# Patient Record
Sex: MALE | Race: WHITE | NOT HISPANIC OR LATINO | URBAN - METROPOLITAN AREA
[De-identification: names, ages, dates, MRNs, and addresses within clinical notes are randomized per-mention and may not be internally consistent; named-entity substitution may affect disease eponyms.]

---

## 2017-02-13 ENCOUNTER — INPATIENT (INPATIENT)
Facility: HOSPITAL | Age: 62
LOS: 4 days | Discharge: HOME | DRG: 871 | End: 2017-02-18
Attending: INTERNAL MEDICINE | Admitting: INTERNAL MEDICINE
Payer: MEDICARE

## 2017-02-13 VITALS
HEART RATE: 104 BPM | WEIGHT: 171.96 LBS | RESPIRATION RATE: 14 BRPM | DIASTOLIC BLOOD PRESSURE: 90 MMHG | OXYGEN SATURATION: 97 % | SYSTOLIC BLOOD PRESSURE: 165 MMHG | TEMPERATURE: 99 F

## 2017-02-13 DIAGNOSIS — R10.33 PERIUMBILICAL PAIN: ICD-10-CM

## 2017-02-13 LAB
ALBUMIN SERPL ELPH-MCNC: 3 G/DL — LOW (ref 3.3–5)
ALP SERPL-CCNC: 288 U/L — HIGH (ref 40–120)
ALT FLD-CCNC: 52 U/L — SIGNIFICANT CHANGE UP (ref 12–78)
AMYLASE P1 CFR SERPL: 80 U/L — SIGNIFICANT CHANGE UP (ref 25–115)
ANION GAP SERPL CALC-SCNC: 9 MMOL/L — SIGNIFICANT CHANGE UP (ref 5–17)
APPEARANCE UR: CLEAR — SIGNIFICANT CHANGE UP
APTT BLD: 36.8 SEC — SIGNIFICANT CHANGE UP (ref 27.5–37.4)
AST SERPL-CCNC: 80 U/L — HIGH (ref 15–37)
BACTERIA # UR AUTO: ABNORMAL
BASOPHILS # BLD AUTO: 0.1 K/UL — SIGNIFICANT CHANGE UP (ref 0–0.2)
BASOPHILS NFR BLD AUTO: 0.9 % — SIGNIFICANT CHANGE UP (ref 0–2)
BILIRUB SERPL-MCNC: 0.6 MG/DL — SIGNIFICANT CHANGE UP (ref 0.2–1.2)
BILIRUB UR-MCNC: ABNORMAL
BUN SERPL-MCNC: 17 MG/DL — SIGNIFICANT CHANGE UP (ref 7–23)
CALCIUM SERPL-MCNC: 8.6 MG/DL — SIGNIFICANT CHANGE UP (ref 8.5–10.1)
CHLORIDE SERPL-SCNC: 99 MMOL/L — SIGNIFICANT CHANGE UP (ref 96–108)
CO2 SERPL-SCNC: 26 MMOL/L — SIGNIFICANT CHANGE UP (ref 22–31)
COLOR SPEC: YELLOW — SIGNIFICANT CHANGE UP
CREAT SERPL-MCNC: 0.91 MG/DL — SIGNIFICANT CHANGE UP (ref 0.5–1.3)
DIFF PNL FLD: ABNORMAL
EOSINOPHIL # BLD AUTO: 0.1 K/UL — SIGNIFICANT CHANGE UP (ref 0–0.5)
EOSINOPHIL NFR BLD AUTO: 0.8 % — SIGNIFICANT CHANGE UP (ref 0–6)
EPI CELLS # UR: SIGNIFICANT CHANGE UP
ETHANOL SERPL-MCNC: <10 MG/DL — SIGNIFICANT CHANGE UP (ref 0–10)
GLUCOSE SERPL-MCNC: 189 MG/DL — HIGH (ref 70–99)
GLUCOSE UR QL: 300 MG/DL
HCT VFR BLD CALC: 41.3 % — SIGNIFICANT CHANGE UP (ref 39–50)
HGB BLD-MCNC: 13.8 G/DL — SIGNIFICANT CHANGE UP (ref 13–17)
INR BLD: 1.19 RATIO — HIGH (ref 0.88–1.16)
KETONES UR-MCNC: ABNORMAL
LEUKOCYTE ESTERASE UR-ACNC: ABNORMAL
LIDOCAIN IGE QN: 61 U/L — LOW (ref 73–393)
LYMPHOCYTES # BLD AUTO: 1.5 K/UL — SIGNIFICANT CHANGE UP (ref 1–3.3)
LYMPHOCYTES # BLD AUTO: 18.6 % — SIGNIFICANT CHANGE UP (ref 13–44)
MCHC RBC-ENTMCNC: 31.8 PG — SIGNIFICANT CHANGE UP (ref 27–34)
MCHC RBC-ENTMCNC: 33.4 GM/DL — SIGNIFICANT CHANGE UP (ref 32–36)
MCV RBC AUTO: 95.1 FL — SIGNIFICANT CHANGE UP (ref 80–100)
MONOCYTES # BLD AUTO: 0.9 K/UL — SIGNIFICANT CHANGE UP (ref 0–0.9)
MONOCYTES NFR BLD AUTO: 10.5 % — HIGH (ref 1–9)
NEUTROPHILS # BLD AUTO: 5.7 K/UL — SIGNIFICANT CHANGE UP (ref 1.8–7.4)
NEUTROPHILS NFR BLD AUTO: 69.1 % — SIGNIFICANT CHANGE UP (ref 43–77)
NITRITE UR-MCNC: NEGATIVE — SIGNIFICANT CHANGE UP
PH UR: 5 — SIGNIFICANT CHANGE UP (ref 4.8–8)
PLATELET # BLD AUTO: 168 K/UL — SIGNIFICANT CHANGE UP (ref 150–400)
POTASSIUM SERPL-MCNC: 4 MMOL/L — SIGNIFICANT CHANGE UP (ref 3.5–5.3)
POTASSIUM SERPL-SCNC: 4 MMOL/L — SIGNIFICANT CHANGE UP (ref 3.5–5.3)
PROT SERPL-MCNC: 9.6 G/DL — HIGH (ref 6–8.3)
PROT UR-MCNC: 150 MG/DL
PROTHROM AB SERPL-ACNC: 13.2 SEC — HIGH (ref 10–13.1)
RBC # BLD: 4.34 M/UL — SIGNIFICANT CHANGE UP (ref 4.2–5.8)
RBC # FLD: 13 % — SIGNIFICANT CHANGE UP (ref 10.3–14.5)
RBC CASTS # UR COMP ASSIST: SIGNIFICANT CHANGE UP /HPF (ref 0–4)
SODIUM SERPL-SCNC: 134 MMOL/L — LOW (ref 135–145)
SP GR SPEC: 1.02 — SIGNIFICANT CHANGE UP (ref 1.01–1.02)
UROBILINOGEN FLD QL: 1
WBC # BLD: 8.2 K/UL — SIGNIFICANT CHANGE UP (ref 3.8–10.5)
WBC # FLD AUTO: 8.2 K/UL — SIGNIFICANT CHANGE UP (ref 3.8–10.5)
WBC UR QL: SIGNIFICANT CHANGE UP

## 2017-02-13 PROCEDURE — 71020: CPT | Mod: 26

## 2017-02-13 PROCEDURE — 74177 CT ABD & PELVIS W/CONTRAST: CPT | Mod: 26

## 2017-02-13 PROCEDURE — 93010 ELECTROCARDIOGRAM REPORT: CPT

## 2017-02-13 PROCEDURE — 99285 EMERGENCY DEPT VISIT HI MDM: CPT

## 2017-02-13 RX ORDER — PIPERACILLIN AND TAZOBACTAM 4; .5 G/20ML; G/20ML
3.38 INJECTION, POWDER, LYOPHILIZED, FOR SOLUTION INTRAVENOUS ONCE
Qty: 0 | Refills: 0 | Status: COMPLETED | OUTPATIENT
Start: 2017-02-13 | End: 2017-02-13

## 2017-02-13 RX ORDER — SODIUM CHLORIDE 9 MG/ML
3 INJECTION INTRAMUSCULAR; INTRAVENOUS; SUBCUTANEOUS ONCE
Qty: 0 | Refills: 0 | Status: COMPLETED | OUTPATIENT
Start: 2017-02-13 | End: 2017-02-13

## 2017-02-13 RX ORDER — MORPHINE SULFATE 50 MG/1
4 CAPSULE, EXTENDED RELEASE ORAL ONCE
Qty: 0 | Refills: 0 | Status: DISCONTINUED | OUTPATIENT
Start: 2017-02-13 | End: 2017-02-13

## 2017-02-13 RX ORDER — IOHEXOL 300 MG/ML
30 INJECTION, SOLUTION INTRAVENOUS ONCE
Qty: 0 | Refills: 0 | Status: COMPLETED | OUTPATIENT
Start: 2017-02-13 | End: 2017-02-13

## 2017-02-13 RX ORDER — SODIUM CHLORIDE 9 MG/ML
1000 INJECTION INTRAMUSCULAR; INTRAVENOUS; SUBCUTANEOUS
Qty: 0 | Refills: 0 | Status: COMPLETED | OUTPATIENT
Start: 2017-02-13 | End: 2017-02-13

## 2017-02-13 RX ORDER — ONDANSETRON 8 MG/1
4 TABLET, FILM COATED ORAL ONCE
Qty: 0 | Refills: 0 | Status: COMPLETED | OUTPATIENT
Start: 2017-02-13 | End: 2017-02-13

## 2017-02-13 RX ADMIN — IOHEXOL 30 MILLILITER(S): 300 INJECTION, SOLUTION INTRAVENOUS at 19:11

## 2017-02-13 RX ADMIN — ONDANSETRON 4 MILLIGRAM(S): 8 TABLET, FILM COATED ORAL at 19:12

## 2017-02-13 RX ADMIN — SODIUM CHLORIDE 1000 MILLILITER(S): 9 INJECTION INTRAMUSCULAR; INTRAVENOUS; SUBCUTANEOUS at 19:14

## 2017-02-13 RX ADMIN — SODIUM CHLORIDE 1000 MILLILITER(S): 9 INJECTION INTRAMUSCULAR; INTRAVENOUS; SUBCUTANEOUS at 22:20

## 2017-02-13 RX ADMIN — SODIUM CHLORIDE 1000 MILLILITER(S): 9 INJECTION INTRAMUSCULAR; INTRAVENOUS; SUBCUTANEOUS at 23:02

## 2017-02-13 RX ADMIN — PIPERACILLIN AND TAZOBACTAM 200 GRAM(S): 4; .5 INJECTION, POWDER, LYOPHILIZED, FOR SOLUTION INTRAVENOUS at 22:21

## 2017-02-13 RX ADMIN — SODIUM CHLORIDE 3 MILLILITER(S): 9 INJECTION INTRAMUSCULAR; INTRAVENOUS; SUBCUTANEOUS at 19:15

## 2017-02-13 RX ADMIN — MORPHINE SULFATE 4 MILLIGRAM(S): 50 CAPSULE, EXTENDED RELEASE ORAL at 19:13

## 2017-02-13 RX ADMIN — MORPHINE SULFATE 4 MILLIGRAM(S): 50 CAPSULE, EXTENDED RELEASE ORAL at 19:30

## 2017-02-13 NOTE — ED ADULT NURSE NOTE - OBJECTIVE STATEMENT
62 y/o males states that yesterday while sleeping he started having sharp RUQ pain scaled at 10. Pain still persists today without relief

## 2017-02-13 NOTE — ED PROVIDER NOTE - OBJECTIVE STATEMENT
60 yo M p/w epigastric / periumbilical abd pain since yesterday. Feels similar to prev pancreatitis. Pt with hx pancreatitis secondary to alcohol. Pt sp antwon. No lower abd pain. Some nausea. No v/d. No cp/sob/palp. No recent REYEZ / easy fatigue. NO numb/ting/focal weak. No agg/allev factors. No other inj or co.

## 2017-02-13 NOTE — ED PROVIDER NOTE - GASTROINTESTINAL, MLM
Abdomen soft, pos tender periumbilical / epigastric, mild RUQ tend, no guarding. No rebound. No HSM.

## 2017-02-13 NOTE — ED PROVIDER NOTE - PROGRESS NOTE DETAILS
Dw Dr SANTANA Meyer, accepts admit to [facility] Dr Per crain. Chad Albarado, not a surgical case, should call GI.

## 2017-02-13 NOTE — ED PROVIDER NOTE - CHPI ED SYMPTOMS NEG
no burning urination/no abdominal distension/no blood in stool/no diarrhea/no hematuria/no chills/no vomiting/no fever

## 2017-02-13 NOTE — ED PROVIDER NOTE - PMH
Chronic alcoholic pancreatitis    Chronic pain with drug dependence  On suboxone now  Depression    Diabetic neuropathy    Emphysema lung    Hepatitis C    HTN (hypertension)    Smoker    Type 2 diabetes mellitus    Weight loss, unintentional  45 lbs in 1 yr

## 2017-02-13 NOTE — ED PROVIDER NOTE - PSH
History of cholecystectomy    S/P arthroscopy of right knee    S/P inguinal hernia repair  left  S/P shoulder surgery  bilateral

## 2017-02-14 DIAGNOSIS — R10.33 PERIUMBILICAL PAIN: ICD-10-CM

## 2017-02-14 DIAGNOSIS — E11.40 TYPE 2 DIABETES MELLITUS WITH DIABETIC NEUROPATHY, UNSPECIFIED: ICD-10-CM

## 2017-02-14 DIAGNOSIS — K86.0 ALCOHOL-INDUCED CHRONIC PANCREATITIS: ICD-10-CM

## 2017-02-14 DIAGNOSIS — J43.9 EMPHYSEMA, UNSPECIFIED: ICD-10-CM

## 2017-02-14 DIAGNOSIS — E11.9 TYPE 2 DIABETES MELLITUS WITHOUT COMPLICATIONS: ICD-10-CM

## 2017-02-14 DIAGNOSIS — I10 ESSENTIAL (PRIMARY) HYPERTENSION: ICD-10-CM

## 2017-02-14 DIAGNOSIS — F32.9 MAJOR DEPRESSIVE DISORDER, SINGLE EPISODE, UNSPECIFIED: ICD-10-CM

## 2017-02-14 DIAGNOSIS — F17.200 NICOTINE DEPENDENCE, UNSPECIFIED, UNCOMPLICATED: ICD-10-CM

## 2017-02-14 DIAGNOSIS — G89.29 OTHER CHRONIC PAIN: ICD-10-CM

## 2017-02-14 DIAGNOSIS — B19.20 UNSPECIFIED VIRAL HEPATITIS C WITHOUT HEPATIC COMA: ICD-10-CM

## 2017-02-14 LAB
AMYLASE P1 CFR SERPL: 73 U/L — SIGNIFICANT CHANGE UP (ref 25–115)
CHOLEST SERPL-MCNC: 130 MG/DL — SIGNIFICANT CHANGE UP (ref 10–199)
CULTURE RESULTS: SIGNIFICANT CHANGE UP
HBA1C BLD-MCNC: 7 % — HIGH (ref 4–5.6)
HCT VFR BLD CALC: 35.2 % — LOW (ref 39–50)
HDLC SERPL-MCNC: 51 MG/DL — SIGNIFICANT CHANGE UP (ref 40–125)
HGB BLD-MCNC: 12.2 G/DL — LOW (ref 13–17)
LACTATE SERPL-SCNC: 0.7 MMOL/L — SIGNIFICANT CHANGE UP (ref 0.7–2)
LIDOCAIN IGE QN: 182 U/L — SIGNIFICANT CHANGE UP (ref 73–393)
LIPID PNL WITH DIRECT LDL SERPL: 67 MG/DL — SIGNIFICANT CHANGE UP
MAGNESIUM SERPL-MCNC: 2 MG/DL — SIGNIFICANT CHANGE UP (ref 1.8–2.4)
MCHC RBC-ENTMCNC: 32.7 PG — SIGNIFICANT CHANGE UP (ref 27–34)
MCHC RBC-ENTMCNC: 34.6 GM/DL — SIGNIFICANT CHANGE UP (ref 32–36)
MCV RBC AUTO: 94.5 FL — SIGNIFICANT CHANGE UP (ref 80–100)
PLATELET # BLD AUTO: 120 K/UL — LOW (ref 150–400)
RBC # BLD: 3.73 M/UL — LOW (ref 4.2–5.8)
RBC # FLD: 12.9 % — SIGNIFICANT CHANGE UP (ref 10.3–14.5)
SPECIMEN SOURCE: SIGNIFICANT CHANGE UP
TOTAL CHOLESTEROL/HDL RATIO MEASUREMENT: 2.5 RATIO — LOW (ref 3.4–9.6)
TRIGL SERPL-MCNC: 61 MG/DL — SIGNIFICANT CHANGE UP (ref 10–149)
TSH SERPL-MCNC: 1.5 UIU/ML — SIGNIFICANT CHANGE UP (ref 0.36–3.74)
WBC # BLD: 6.4 K/UL — SIGNIFICANT CHANGE UP (ref 3.8–10.5)
WBC # FLD AUTO: 6.4 K/UL — SIGNIFICANT CHANGE UP (ref 3.8–10.5)

## 2017-02-14 PROCEDURE — 74183 MRI ABD W/O CNTR FLWD CNTR: CPT | Mod: 26

## 2017-02-14 RX ORDER — THIAMINE MONONITRATE (VIT B1) 100 MG
100 TABLET ORAL DAILY
Qty: 0 | Refills: 0 | Status: DISCONTINUED | OUTPATIENT
Start: 2017-02-14 | End: 2017-02-18

## 2017-02-14 RX ORDER — ENOXAPARIN SODIUM 100 MG/ML
40 INJECTION SUBCUTANEOUS EVERY 24 HOURS
Qty: 0 | Refills: 0 | Status: DISCONTINUED | OUTPATIENT
Start: 2017-02-14 | End: 2017-02-18

## 2017-02-14 RX ORDER — PIPERACILLIN AND TAZOBACTAM 4; .5 G/20ML; G/20ML
3.38 INJECTION, POWDER, LYOPHILIZED, FOR SOLUTION INTRAVENOUS EVERY 8 HOURS
Qty: 0 | Refills: 0 | Status: DISCONTINUED | OUTPATIENT
Start: 2017-02-14 | End: 2017-02-17

## 2017-02-14 RX ORDER — DEXTROSE 50 % IN WATER 50 %
25 SYRINGE (ML) INTRAVENOUS ONCE
Qty: 0 | Refills: 0 | Status: DISCONTINUED | OUTPATIENT
Start: 2017-02-14 | End: 2017-02-18

## 2017-02-14 RX ORDER — DOCUSATE SODIUM 100 MG
100 CAPSULE ORAL THREE TIMES A DAY
Qty: 0 | Refills: 0 | Status: DISCONTINUED | OUTPATIENT
Start: 2017-02-14 | End: 2017-02-18

## 2017-02-14 RX ORDER — FLUVOXAMINE MALEATE 25 MG/1
50 TABLET ORAL
Qty: 0 | Refills: 0 | Status: DISCONTINUED | OUTPATIENT
Start: 2017-02-14 | End: 2017-02-18

## 2017-02-14 RX ORDER — FOLIC ACID 0.8 MG
1 TABLET ORAL DAILY
Qty: 0 | Refills: 0 | Status: DISCONTINUED | OUTPATIENT
Start: 2017-02-14 | End: 2017-02-18

## 2017-02-14 RX ORDER — GLUCAGON INJECTION, SOLUTION 0.5 MG/.1ML
1 INJECTION, SOLUTION SUBCUTANEOUS ONCE
Qty: 0 | Refills: 0 | Status: DISCONTINUED | OUTPATIENT
Start: 2017-02-14 | End: 2017-02-18

## 2017-02-14 RX ORDER — DEXTROSE 50 % IN WATER 50 %
12.5 SYRINGE (ML) INTRAVENOUS ONCE
Qty: 0 | Refills: 0 | Status: DISCONTINUED | OUTPATIENT
Start: 2017-02-14 | End: 2017-02-18

## 2017-02-14 RX ORDER — TRAZODONE HCL 50 MG
50 TABLET ORAL AT BEDTIME
Qty: 0 | Refills: 0 | Status: DISCONTINUED | OUTPATIENT
Start: 2017-02-14 | End: 2017-02-18

## 2017-02-14 RX ORDER — PANTOPRAZOLE SODIUM 20 MG/1
40 TABLET, DELAYED RELEASE ORAL DAILY
Qty: 0 | Refills: 0 | Status: DISCONTINUED | OUTPATIENT
Start: 2017-02-14 | End: 2017-02-16

## 2017-02-14 RX ORDER — SODIUM CHLORIDE 9 MG/ML
1000 INJECTION INTRAMUSCULAR; INTRAVENOUS; SUBCUTANEOUS
Qty: 0 | Refills: 0 | Status: DISCONTINUED | OUTPATIENT
Start: 2017-02-14 | End: 2017-02-16

## 2017-02-14 RX ORDER — INSULIN LISPRO 100/ML
VIAL (ML) SUBCUTANEOUS AT BEDTIME
Qty: 0 | Refills: 0 | Status: DISCONTINUED | OUTPATIENT
Start: 2017-02-14 | End: 2017-02-18

## 2017-02-14 RX ORDER — MORPHINE SULFATE 50 MG/1
2 CAPSULE, EXTENDED RELEASE ORAL EVERY 4 HOURS
Qty: 0 | Refills: 0 | Status: DISCONTINUED | OUTPATIENT
Start: 2017-02-14 | End: 2017-02-16

## 2017-02-14 RX ORDER — SENNA PLUS 8.6 MG/1
2 TABLET ORAL AT BEDTIME
Qty: 0 | Refills: 0 | Status: DISCONTINUED | OUTPATIENT
Start: 2017-02-14 | End: 2017-02-18

## 2017-02-14 RX ORDER — FAMOTIDINE 10 MG/ML
20 INJECTION INTRAVENOUS DAILY
Qty: 0 | Refills: 0 | Status: DISCONTINUED | OUTPATIENT
Start: 2017-02-14 | End: 2017-02-15

## 2017-02-14 RX ORDER — DEXTROSE 50 % IN WATER 50 %
1 SYRINGE (ML) INTRAVENOUS ONCE
Qty: 0 | Refills: 0 | Status: DISCONTINUED | OUTPATIENT
Start: 2017-02-14 | End: 2017-02-18

## 2017-02-14 RX ORDER — SODIUM CHLORIDE 9 MG/ML
1000 INJECTION, SOLUTION INTRAVENOUS
Qty: 0 | Refills: 0 | Status: DISCONTINUED | OUTPATIENT
Start: 2017-02-14 | End: 2017-02-18

## 2017-02-14 RX ADMIN — FLUVOXAMINE MALEATE 50 MILLIGRAM(S): 25 TABLET ORAL at 18:25

## 2017-02-14 RX ADMIN — Medication 100 MILLIGRAM(S): at 18:25

## 2017-02-14 RX ADMIN — Medication 150 MILLIGRAM(S): at 13:55

## 2017-02-14 RX ADMIN — MORPHINE SULFATE 2 MILLIGRAM(S): 50 CAPSULE, EXTENDED RELEASE ORAL at 13:47

## 2017-02-14 RX ADMIN — Medication 150 MILLIGRAM(S): at 02:49

## 2017-02-14 RX ADMIN — Medication 1 MILLIGRAM(S): at 18:25

## 2017-02-14 RX ADMIN — Medication 1 TABLET(S): at 18:25

## 2017-02-14 RX ADMIN — ENOXAPARIN SODIUM 40 MILLIGRAM(S): 100 INJECTION SUBCUTANEOUS at 02:48

## 2017-02-14 RX ADMIN — MORPHINE SULFATE 2 MILLIGRAM(S): 50 CAPSULE, EXTENDED RELEASE ORAL at 08:14

## 2017-02-14 RX ADMIN — PIPERACILLIN AND TAZOBACTAM 25 GRAM(S): 4; .5 INJECTION, POWDER, LYOPHILIZED, FOR SOLUTION INTRAVENOUS at 13:56

## 2017-02-14 RX ADMIN — FAMOTIDINE 20 MILLIGRAM(S): 10 INJECTION INTRAVENOUS at 02:48

## 2017-02-14 RX ADMIN — PIPERACILLIN AND TAZOBACTAM 25 GRAM(S): 4; .5 INJECTION, POWDER, LYOPHILIZED, FOR SOLUTION INTRAVENOUS at 23:17

## 2017-02-14 RX ADMIN — SODIUM CHLORIDE 100 MILLILITER(S): 9 INJECTION INTRAMUSCULAR; INTRAVENOUS; SUBCUTANEOUS at 03:02

## 2017-02-14 RX ADMIN — ENOXAPARIN SODIUM 40 MILLIGRAM(S): 100 INJECTION SUBCUTANEOUS at 23:23

## 2017-02-14 RX ADMIN — MORPHINE SULFATE 2 MILLIGRAM(S): 50 CAPSULE, EXTENDED RELEASE ORAL at 23:13

## 2017-02-14 RX ADMIN — MORPHINE SULFATE 2 MILLIGRAM(S): 50 CAPSULE, EXTENDED RELEASE ORAL at 01:44

## 2017-02-14 NOTE — H&P ADULT. - RS GEN PE MLT RESP DETAILS PC
diminished breath sounds, L/normal/airway patent/good air movement/breath sounds equal/diminished breath sounds, R

## 2017-02-14 NOTE — H&P ADULT. - PROBLEM SELECTOR PLAN 1
GI cons Admit for septic workup and ID evaluation,send blood and urine cx,serial lactate levels,monitor vitals closley,ivfs hydration,monitor urine output and renal profile,iv abx as per id cons

## 2017-02-14 NOTE — H&P ADULT. - HISTORY OF PRESENT ILLNESS
60 yo Male with hx of DM,HTN,Emphysema, etoh induced pancreatitis came to ER with  epigastric / periumbilical pain for 1 day . Feels similar to prev pancreatitisl. Pt is s/ p antwon. No lower abd pain. Some nausea. No v/d. No cp/sob/palp. No recent REYEZ / easy fatigue. NO numb/ting/focal weak CT abdomen demostarted heaptic billiary tree dilation with suspicion of cholangitis , surg cons was contaced by er and GI cons requested Admit for septic workup and ID evaluation,send blood and urine cx,serial lactate levels,monitor vitals closley,ivfs hydration,monitor urine output and renal profile,iv abx as per id cons

## 2017-02-14 NOTE — H&P ADULT. - ASSESSMENT
62 yo Male with hx of etoh induced pancreatitis came to ER with  epigastric / periumbilical pain for 1 day . Feels similar to prev pancreatitisl. Pt is s/ p antwon. No lower abd pain. Some nausea. No v/d. No cp/sob/palp. No recent REYEZ / easy fatigue. NO numb/ting/focal weak CT abdomen demostarted heaptic billiary tree dilation with suspicion of cholangitis , surg cons was contaced by er and GI cons requested Admit for septic workup and ID evaluation,send blood and urine cx,serial lactate levels,monitor vitals closley,ivfs hydration,monitor urine output and renal profile,iv abx as per id cons

## 2017-02-15 LAB
ALBUMIN SERPL ELPH-MCNC: 2.5 G/DL — LOW (ref 3.3–5)
ALP SERPL-CCNC: 295 U/L — HIGH (ref 40–120)
ALT FLD-CCNC: 39 U/L — SIGNIFICANT CHANGE UP (ref 12–78)
ANION GAP SERPL CALC-SCNC: 8 MMOL/L — SIGNIFICANT CHANGE UP (ref 5–17)
AST SERPL-CCNC: 59 U/L — HIGH (ref 15–37)
BASOPHILS # BLD AUTO: 0.1 K/UL — SIGNIFICANT CHANGE UP (ref 0–0.2)
BASOPHILS NFR BLD AUTO: 1 % — SIGNIFICANT CHANGE UP (ref 0–2)
BILIRUB SERPL-MCNC: 1 MG/DL — SIGNIFICANT CHANGE UP (ref 0.2–1.2)
BUN SERPL-MCNC: 10 MG/DL — SIGNIFICANT CHANGE UP (ref 7–23)
CALCIUM SERPL-MCNC: 8 MG/DL — LOW (ref 8.5–10.1)
CHLORIDE SERPL-SCNC: 103 MMOL/L — SIGNIFICANT CHANGE UP (ref 96–108)
CO2 SERPL-SCNC: 24 MMOL/L — SIGNIFICANT CHANGE UP (ref 22–31)
CREAT SERPL-MCNC: 0.77 MG/DL — SIGNIFICANT CHANGE UP (ref 0.5–1.3)
EOSINOPHIL # BLD AUTO: 0.1 K/UL — SIGNIFICANT CHANGE UP (ref 0–0.5)
EOSINOPHIL NFR BLD AUTO: 1.5 % — SIGNIFICANT CHANGE UP (ref 0–6)
GLUCOSE SERPL-MCNC: 137 MG/DL — HIGH (ref 70–99)
GRAM STN FLD: SIGNIFICANT CHANGE UP
GRAM STN FLD: SIGNIFICANT CHANGE UP
HBV CORE AB SER-ACNC: SIGNIFICANT CHANGE UP
HBV SURFACE AB SER-ACNC: SIGNIFICANT CHANGE UP
HBV SURFACE AG SER-ACNC: SIGNIFICANT CHANGE UP
HCT VFR BLD CALC: 37.1 % — LOW (ref 39–50)
HCV RNA SERPL NAA DL=5-ACNC: HIGH IU/ML
HCV RNA SPEC NAA+PROBE-LOG IU: 5.78 LOGIU/ML — HIGH
HGB BLD-MCNC: 12.3 G/DL — LOW (ref 13–17)
LYMPHOCYTES # BLD AUTO: 1.5 K/UL — SIGNIFICANT CHANGE UP (ref 1–3.3)
LYMPHOCYTES # BLD AUTO: 27.3 % — SIGNIFICANT CHANGE UP (ref 13–44)
MCHC RBC-ENTMCNC: 31.8 PG — SIGNIFICANT CHANGE UP (ref 27–34)
MCHC RBC-ENTMCNC: 33.2 GM/DL — SIGNIFICANT CHANGE UP (ref 32–36)
MCV RBC AUTO: 95.7 FL — SIGNIFICANT CHANGE UP (ref 80–100)
MONOCYTES # BLD AUTO: 0.8 K/UL — SIGNIFICANT CHANGE UP (ref 0–0.9)
MONOCYTES NFR BLD AUTO: 13.9 % — HIGH (ref 1–9)
NEUTROPHILS # BLD AUTO: 3.1 K/UL — SIGNIFICANT CHANGE UP (ref 1.8–7.4)
NEUTROPHILS NFR BLD AUTO: 56.3 % — SIGNIFICANT CHANGE UP (ref 43–77)
PCP SPEC-MCNC: SIGNIFICANT CHANGE UP
PLATELET # BLD AUTO: 138 K/UL — LOW (ref 150–400)
POTASSIUM SERPL-MCNC: 3.9 MMOL/L — SIGNIFICANT CHANGE UP (ref 3.5–5.3)
POTASSIUM SERPL-SCNC: 3.9 MMOL/L — SIGNIFICANT CHANGE UP (ref 3.5–5.3)
PROCALCITONIN SERPL-MCNC: 0.19 NG/ML — HIGH (ref 0–0.05)
PROT SERPL-MCNC: 8.4 G/DL — HIGH (ref 6–8.3)
RBC # BLD: 3.88 M/UL — LOW (ref 4.2–5.8)
RBC # FLD: 12.9 % — SIGNIFICANT CHANGE UP (ref 10.3–14.5)
SODIUM SERPL-SCNC: 135 MMOL/L — SIGNIFICANT CHANGE UP (ref 135–145)
SPECIMEN SOURCE: SIGNIFICANT CHANGE UP
SPECIMEN SOURCE: SIGNIFICANT CHANGE UP
WBC # BLD: 5.5 K/UL — SIGNIFICANT CHANGE UP (ref 3.8–10.5)
WBC # FLD AUTO: 5.5 K/UL — SIGNIFICANT CHANGE UP (ref 3.8–10.5)

## 2017-02-15 RX ORDER — VANCOMYCIN HCL 1 G
1000 VIAL (EA) INTRAVENOUS EVERY 12 HOURS
Qty: 0 | Refills: 0 | Status: DISCONTINUED | OUTPATIENT
Start: 2017-02-15 | End: 2017-02-17

## 2017-02-15 RX ORDER — VANCOMYCIN HCL 1 G
1000 VIAL (EA) INTRAVENOUS EVERY 24 HOURS
Qty: 0 | Refills: 0 | Status: DISCONTINUED | OUTPATIENT
Start: 2017-02-15 | End: 2017-02-15

## 2017-02-15 RX ADMIN — MORPHINE SULFATE 2 MILLIGRAM(S): 50 CAPSULE, EXTENDED RELEASE ORAL at 19:55

## 2017-02-15 RX ADMIN — Medication 250 MILLIGRAM(S): at 09:47

## 2017-02-15 RX ADMIN — PIPERACILLIN AND TAZOBACTAM 25 GRAM(S): 4; .5 INJECTION, POWDER, LYOPHILIZED, FOR SOLUTION INTRAVENOUS at 21:13

## 2017-02-15 RX ADMIN — MORPHINE SULFATE 2 MILLIGRAM(S): 50 CAPSULE, EXTENDED RELEASE ORAL at 13:47

## 2017-02-15 RX ADMIN — Medication 150 MILLIGRAM(S): at 00:17

## 2017-02-15 RX ADMIN — PANTOPRAZOLE SODIUM 40 MILLIGRAM(S): 20 TABLET, DELAYED RELEASE ORAL at 11:21

## 2017-02-15 RX ADMIN — Medication 100 MILLIGRAM(S): at 11:21

## 2017-02-15 RX ADMIN — Medication 1 MILLIGRAM(S): at 11:21

## 2017-02-15 RX ADMIN — SODIUM CHLORIDE 100 MILLILITER(S): 9 INJECTION INTRAMUSCULAR; INTRAVENOUS; SUBCUTANEOUS at 09:02

## 2017-02-15 RX ADMIN — SODIUM CHLORIDE 100 MILLILITER(S): 9 INJECTION INTRAMUSCULAR; INTRAVENOUS; SUBCUTANEOUS at 19:20

## 2017-02-15 RX ADMIN — MORPHINE SULFATE 2 MILLIGRAM(S): 50 CAPSULE, EXTENDED RELEASE ORAL at 15:19

## 2017-02-15 RX ADMIN — Medication 150 MILLIGRAM(S): at 05:39

## 2017-02-15 RX ADMIN — MORPHINE SULFATE 2 MILLIGRAM(S): 50 CAPSULE, EXTENDED RELEASE ORAL at 20:30

## 2017-02-15 RX ADMIN — MORPHINE SULFATE 2 MILLIGRAM(S): 50 CAPSULE, EXTENDED RELEASE ORAL at 00:23

## 2017-02-15 RX ADMIN — PIPERACILLIN AND TAZOBACTAM 25 GRAM(S): 4; .5 INJECTION, POWDER, LYOPHILIZED, FOR SOLUTION INTRAVENOUS at 13:45

## 2017-02-15 RX ADMIN — MORPHINE SULFATE 2 MILLIGRAM(S): 50 CAPSULE, EXTENDED RELEASE ORAL at 07:46

## 2017-02-15 RX ADMIN — Medication 1 TABLET(S): at 11:21

## 2017-02-15 RX ADMIN — MORPHINE SULFATE 2 MILLIGRAM(S): 50 CAPSULE, EXTENDED RELEASE ORAL at 09:24

## 2017-02-15 RX ADMIN — Medication 150 MILLIGRAM(S): at 13:46

## 2017-02-15 RX ADMIN — PIPERACILLIN AND TAZOBACTAM 25 GRAM(S): 4; .5 INJECTION, POWDER, LYOPHILIZED, FOR SOLUTION INTRAVENOUS at 05:39

## 2017-02-15 RX ADMIN — Medication 250 MILLIGRAM(S): at 21:13

## 2017-02-15 RX ADMIN — Medication 150 MILLIGRAM(S): at 21:13

## 2017-02-15 RX ADMIN — ENOXAPARIN SODIUM 40 MILLIGRAM(S): 100 INJECTION SUBCUTANEOUS at 21:19

## 2017-02-16 LAB
% ALBUMIN: 37.2 % — SIGNIFICANT CHANGE UP
% ALPHA 1: 5 % — SIGNIFICANT CHANGE UP
% ALPHA 2: 10.9 % — SIGNIFICANT CHANGE UP
% BETA: 8.6 % — SIGNIFICANT CHANGE UP
% GAMMA: 38.3 % — SIGNIFICANT CHANGE UP
AFP-TM SERPL-MCNC: 694.7 NG/ML — HIGH
ALBUMIN SERPL ELPH-MCNC: 2.4 G/DL — LOW (ref 3.3–5)
ALBUMIN SERPL ELPH-MCNC: 2.9 G/DL — LOW (ref 3.6–5.5)
ALBUMIN/GLOB SERPL ELPH: 0.6 RATIO — SIGNIFICANT CHANGE UP
ALP SERPL-CCNC: 267 U/L — HIGH (ref 40–120)
ALPHA1 GLOB SERPL ELPH-MCNC: 0.4 G/DL — SIGNIFICANT CHANGE UP (ref 0.1–0.4)
ALPHA2 GLOB SERPL ELPH-MCNC: 0.9 G/DL — SIGNIFICANT CHANGE UP (ref 0.5–1)
ALT FLD-CCNC: 31 U/L — SIGNIFICANT CHANGE UP (ref 12–78)
ANION GAP SERPL CALC-SCNC: 8 MMOL/L — SIGNIFICANT CHANGE UP (ref 5–17)
APTT BLD: 35.5 SEC — SIGNIFICANT CHANGE UP (ref 27.5–37.4)
AST SERPL-CCNC: 43 U/L — HIGH (ref 15–37)
B-GLOBULIN SERPL ELPH-MCNC: 0.7 G/DL — SIGNIFICANT CHANGE UP (ref 0.5–1)
BASOPHILS # BLD AUTO: 0.1 K/UL — SIGNIFICANT CHANGE UP (ref 0–0.2)
BASOPHILS NFR BLD AUTO: 1.3 % — SIGNIFICANT CHANGE UP (ref 0–2)
BILIRUB SERPL-MCNC: 0.6 MG/DL — SIGNIFICANT CHANGE UP (ref 0.2–1.2)
BUN SERPL-MCNC: 10 MG/DL — SIGNIFICANT CHANGE UP (ref 7–23)
CALCIUM SERPL-MCNC: 8.2 MG/DL — LOW (ref 8.5–10.1)
CANCER AG19-9 SERPL-ACNC: 231.7 U/ML — HIGH
CANCER AG19-9 SERPL-ACNC: 235 U/ML — HIGH
CHLORIDE SERPL-SCNC: 103 MMOL/L — SIGNIFICANT CHANGE UP (ref 96–108)
CO2 SERPL-SCNC: 27 MMOL/L — SIGNIFICANT CHANGE UP (ref 22–31)
CREAT SERPL-MCNC: 0.74 MG/DL — SIGNIFICANT CHANGE UP (ref 0.5–1.3)
CULTURE RESULTS: SIGNIFICANT CHANGE UP
CULTURE RESULTS: SIGNIFICANT CHANGE UP
EOSINOPHIL # BLD AUTO: 0.1 K/UL — SIGNIFICANT CHANGE UP (ref 0–0.5)
EOSINOPHIL NFR BLD AUTO: 2.9 % — SIGNIFICANT CHANGE UP (ref 0–6)
FERRITIN SERPL-MCNC: 314.7 NG/ML — SIGNIFICANT CHANGE UP (ref 30–400)
FOLATE SERPL-MCNC: 16.2 NG/ML — SIGNIFICANT CHANGE UP (ref 4.8–24.2)
GAMMA GLOBULIN: 3 G/DL — HIGH (ref 0.6–1.6)
GLUCOSE SERPL-MCNC: 116 MG/DL — HIGH (ref 70–99)
GRAM STN FLD: SIGNIFICANT CHANGE UP
GRAM STN FLD: SIGNIFICANT CHANGE UP
HCT VFR BLD CALC: 35.3 % — LOW (ref 39–50)
HGB BLD-MCNC: 11.7 G/DL — LOW (ref 13–17)
INR BLD: 1.11 RATIO — SIGNIFICANT CHANGE UP (ref 0.88–1.16)
INTERPRETATION SERPL IFE-IMP: SIGNIFICANT CHANGE UP
IRON SATN MFR SERPL: 26 % — SIGNIFICANT CHANGE UP (ref 16–55)
IRON SATN MFR SERPL: 61 UG/DL — SIGNIFICANT CHANGE UP (ref 45–165)
LYMPHOCYTES # BLD AUTO: 1.8 K/UL — SIGNIFICANT CHANGE UP (ref 1–3.3)
LYMPHOCYTES # BLD AUTO: 38.8 % — SIGNIFICANT CHANGE UP (ref 13–44)
MCHC RBC-ENTMCNC: 31.7 PG — SIGNIFICANT CHANGE UP (ref 27–34)
MCHC RBC-ENTMCNC: 33.1 GM/DL — SIGNIFICANT CHANGE UP (ref 32–36)
MCV RBC AUTO: 95.7 FL — SIGNIFICANT CHANGE UP (ref 80–100)
MONOCYTES # BLD AUTO: 0.5 K/UL — SIGNIFICANT CHANGE UP (ref 0–0.9)
MONOCYTES NFR BLD AUTO: 11.6 % — HIGH (ref 1–9)
NEUTROPHILS # BLD AUTO: 2.1 K/UL — SIGNIFICANT CHANGE UP (ref 1.8–7.4)
NEUTROPHILS NFR BLD AUTO: 45.4 % — SIGNIFICANT CHANGE UP (ref 43–77)
PLATELET # BLD AUTO: 135 K/UL — LOW (ref 150–400)
POTASSIUM SERPL-MCNC: 3.8 MMOL/L — SIGNIFICANT CHANGE UP (ref 3.5–5.3)
POTASSIUM SERPL-SCNC: 3.8 MMOL/L — SIGNIFICANT CHANGE UP (ref 3.5–5.3)
PROT ?TM UR-MCNC: 11 MG/DL — SIGNIFICANT CHANGE UP (ref 0–12)
PROT PATTERN SERPL ELPH-IMP: SIGNIFICANT CHANGE UP
PROT SERPL-MCNC: 7.8 G/DL — SIGNIFICANT CHANGE UP (ref 6–8.3)
PROT SERPL-MCNC: 7.8 G/DL — SIGNIFICANT CHANGE UP (ref 6–8.3)
PROT SERPL-MCNC: 8.1 G/DL — SIGNIFICANT CHANGE UP (ref 6–8.3)
PROTHROM AB SERPL-ACNC: 12.3 SEC — SIGNIFICANT CHANGE UP (ref 10–13.1)
RBC # BLD: 3.68 M/UL — LOW (ref 4.2–5.8)
RBC # FLD: 12.8 % — SIGNIFICANT CHANGE UP (ref 10.3–14.5)
SODIUM SERPL-SCNC: 138 MMOL/L — SIGNIFICANT CHANGE UP (ref 135–145)
SPECIMEN SOURCE: SIGNIFICANT CHANGE UP
TIBC SERPL-MCNC: 233 UG/DL — SIGNIFICANT CHANGE UP (ref 220–430)
UIBC SERPL-MCNC: 172 UG/DL — SIGNIFICANT CHANGE UP (ref 110–370)
VANCOMYCIN TROUGH SERPL-MCNC: 5.8 UG/ML — LOW (ref 10–20)
VIT B12 SERPL-MCNC: 921 PG/ML — HIGH (ref 243–894)
WBC # BLD: 4.6 K/UL — SIGNIFICANT CHANGE UP (ref 3.8–10.5)
WBC # FLD AUTO: 4.6 K/UL — SIGNIFICANT CHANGE UP (ref 3.8–10.5)

## 2017-02-16 PROCEDURE — 78306 BONE IMAGING WHOLE BODY: CPT | Mod: 26

## 2017-02-16 PROCEDURE — 71250 CT THORAX DX C-: CPT | Mod: 26

## 2017-02-16 RX ORDER — IBUPROFEN 200 MG
400 TABLET ORAL EVERY 6 HOURS
Qty: 0 | Refills: 0 | Status: DISCONTINUED | OUTPATIENT
Start: 2017-02-16 | End: 2017-02-18

## 2017-02-16 RX ORDER — PANTOPRAZOLE SODIUM 20 MG/1
40 TABLET, DELAYED RELEASE ORAL
Qty: 0 | Refills: 0 | Status: DISCONTINUED | OUTPATIENT
Start: 2017-02-16 | End: 2017-02-18

## 2017-02-16 RX ORDER — MORPHINE SULFATE 50 MG/1
2 CAPSULE, EXTENDED RELEASE ORAL EVERY 4 HOURS
Qty: 0 | Refills: 0 | Status: DISCONTINUED | OUTPATIENT
Start: 2017-02-16 | End: 2017-02-18

## 2017-02-16 RX ADMIN — Medication 1 TABLET(S): at 11:47

## 2017-02-16 RX ADMIN — PIPERACILLIN AND TAZOBACTAM 25 GRAM(S): 4; .5 INJECTION, POWDER, LYOPHILIZED, FOR SOLUTION INTRAVENOUS at 14:36

## 2017-02-16 RX ADMIN — PANTOPRAZOLE SODIUM 40 MILLIGRAM(S): 20 TABLET, DELAYED RELEASE ORAL at 05:23

## 2017-02-16 RX ADMIN — ENOXAPARIN SODIUM 40 MILLIGRAM(S): 100 INJECTION SUBCUTANEOUS at 21:30

## 2017-02-16 RX ADMIN — MORPHINE SULFATE 2 MILLIGRAM(S): 50 CAPSULE, EXTENDED RELEASE ORAL at 09:57

## 2017-02-16 RX ADMIN — Medication 150 MILLIGRAM(S): at 05:20

## 2017-02-16 RX ADMIN — Medication 1 MILLIGRAM(S): at 11:47

## 2017-02-16 RX ADMIN — Medication 100 MILLIGRAM(S): at 11:47

## 2017-02-16 RX ADMIN — Medication 150 MILLIGRAM(S): at 21:29

## 2017-02-16 RX ADMIN — PIPERACILLIN AND TAZOBACTAM 25 GRAM(S): 4; .5 INJECTION, POWDER, LYOPHILIZED, FOR SOLUTION INTRAVENOUS at 05:19

## 2017-02-16 RX ADMIN — MORPHINE SULFATE 2 MILLIGRAM(S): 50 CAPSULE, EXTENDED RELEASE ORAL at 02:34

## 2017-02-16 RX ADMIN — Medication 50 MILLIGRAM(S): at 21:29

## 2017-02-16 RX ADMIN — Medication 250 MILLIGRAM(S): at 21:20

## 2017-02-16 RX ADMIN — MORPHINE SULFATE 2 MILLIGRAM(S): 50 CAPSULE, EXTENDED RELEASE ORAL at 21:45

## 2017-02-16 RX ADMIN — MORPHINE SULFATE 2 MILLIGRAM(S): 50 CAPSULE, EXTENDED RELEASE ORAL at 04:00

## 2017-02-16 RX ADMIN — Medication 250 MILLIGRAM(S): at 10:16

## 2017-02-16 RX ADMIN — MORPHINE SULFATE 2 MILLIGRAM(S): 50 CAPSULE, EXTENDED RELEASE ORAL at 12:15

## 2017-02-16 RX ADMIN — MORPHINE SULFATE 2 MILLIGRAM(S): 50 CAPSULE, EXTENDED RELEASE ORAL at 21:19

## 2017-02-16 RX ADMIN — Medication 150 MILLIGRAM(S): at 13:15

## 2017-02-16 RX ADMIN — PIPERACILLIN AND TAZOBACTAM 25 GRAM(S): 4; .5 INJECTION, POWDER, LYOPHILIZED, FOR SOLUTION INTRAVENOUS at 23:11

## 2017-02-16 NOTE — PROVIDER CONTACT NOTE (CRITICAL VALUE NOTIFICATION) - SITUATION
Blood culture on 14th preliminary growth in aerobic and anaerobic bottle gram positive cocci in clusters
admitted for abdominal pain results of b/c    bottle one aerobic gram pos cocci in clusters  bottle two aerobic gram pos in pairs and chains

## 2017-02-17 ENCOUNTER — TRANSCRIPTION ENCOUNTER (OUTPATIENT)
Age: 62
End: 2017-02-17

## 2017-02-17 LAB
ANION GAP SERPL CALC-SCNC: 8 MMOL/L — SIGNIFICANT CHANGE UP (ref 5–17)
BUN SERPL-MCNC: 12 MG/DL — SIGNIFICANT CHANGE UP (ref 7–23)
CALCIUM SERPL-MCNC: 8.5 MG/DL — SIGNIFICANT CHANGE UP (ref 8.5–10.1)
CHLORIDE SERPL-SCNC: 101 MMOL/L — SIGNIFICANT CHANGE UP (ref 96–108)
CO2 SERPL-SCNC: 28 MMOL/L — SIGNIFICANT CHANGE UP (ref 22–31)
CREAT SERPL-MCNC: 0.87 MG/DL — SIGNIFICANT CHANGE UP (ref 0.5–1.3)
CULTURE RESULTS: SIGNIFICANT CHANGE UP
CULTURE RESULTS: SIGNIFICANT CHANGE UP
GLUCOSE SERPL-MCNC: 118 MG/DL — HIGH (ref 70–99)
HBV DNA # SERPL NAA+PROBE: SIGNIFICANT CHANGE UP
HBV DNA SERPL NAA+PROBE-LOG#: SIGNIFICANT CHANGE UP LOGIU/ML
HCT VFR BLD CALC: 36 % — LOW (ref 39–50)
HCV RNA SERPL NAA DL=5-ACNC: HIGH IU/ML
HCV RNA SPEC NAA+PROBE-LOG IU: 5.97 LOGIU/ML — HIGH
HGB BLD-MCNC: 12 G/DL — LOW (ref 13–17)
MCHC RBC-ENTMCNC: 32 PG — SIGNIFICANT CHANGE UP (ref 27–34)
MCHC RBC-ENTMCNC: 33.4 GM/DL — SIGNIFICANT CHANGE UP (ref 32–36)
MCV RBC AUTO: 95.9 FL — SIGNIFICANT CHANGE UP (ref 80–100)
PLATELET # BLD AUTO: 153 K/UL — SIGNIFICANT CHANGE UP (ref 150–400)
POTASSIUM SERPL-MCNC: 3.7 MMOL/L — SIGNIFICANT CHANGE UP (ref 3.5–5.3)
POTASSIUM SERPL-SCNC: 3.7 MMOL/L — SIGNIFICANT CHANGE UP (ref 3.5–5.3)
RBC # BLD: 3.76 M/UL — LOW (ref 4.2–5.8)
RBC # FLD: 12.6 % — SIGNIFICANT CHANGE UP (ref 10.3–14.5)
SODIUM SERPL-SCNC: 137 MMOL/L — SIGNIFICANT CHANGE UP (ref 135–145)
SPECIMEN SOURCE: SIGNIFICANT CHANGE UP
VANCOMYCIN TROUGH SERPL-MCNC: 7 UG/ML — LOW (ref 10–20)
WBC # BLD: 5 K/UL — SIGNIFICANT CHANGE UP (ref 3.8–10.5)
WBC # FLD AUTO: 5 K/UL — SIGNIFICANT CHANGE UP (ref 3.8–10.5)

## 2017-02-17 RX ORDER — DOCUSATE SODIUM 100 MG
1 CAPSULE ORAL
Qty: 0 | Refills: 0 | COMMUNITY
Start: 2017-02-17

## 2017-02-17 RX ORDER — ASPIRIN/CALCIUM CARB/MAGNESIUM 324 MG
2 TABLET ORAL
Qty: 0 | Refills: 0 | COMMUNITY
Start: 2017-02-17

## 2017-02-17 RX ORDER — LACTOBACILLUS ACIDOPHILUS 100MM CELL
1 CAPSULE ORAL
Qty: 0 | Refills: 0 | Status: DISCONTINUED | OUTPATIENT
Start: 2017-02-17 | End: 2017-02-18

## 2017-02-17 RX ORDER — IBUPROFEN 200 MG
1 TABLET ORAL
Qty: 0 | Refills: 0 | COMMUNITY
Start: 2017-02-17

## 2017-02-17 RX ORDER — THIAMINE MONONITRATE (VIT B1) 100 MG
1 TABLET ORAL
Qty: 0 | Refills: 0 | COMMUNITY
Start: 2017-02-17

## 2017-02-17 RX ORDER — FOLIC ACID 0.8 MG
1 TABLET ORAL
Qty: 0 | Refills: 0 | COMMUNITY
Start: 2017-02-17

## 2017-02-17 RX ORDER — ASPIRIN/CALCIUM CARB/MAGNESIUM 324 MG
162 TABLET ORAL DAILY
Qty: 0 | Refills: 0 | Status: DISCONTINUED | OUTPATIENT
Start: 2017-02-17 | End: 2017-02-18

## 2017-02-17 RX ORDER — SENNA PLUS 8.6 MG/1
2 TABLET ORAL
Qty: 0 | Refills: 0 | COMMUNITY
Start: 2017-02-17

## 2017-02-17 RX ADMIN — MORPHINE SULFATE 2 MILLIGRAM(S): 50 CAPSULE, EXTENDED RELEASE ORAL at 11:03

## 2017-02-17 RX ADMIN — Medication 150 MILLIGRAM(S): at 22:35

## 2017-02-17 RX ADMIN — MORPHINE SULFATE 2 MILLIGRAM(S): 50 CAPSULE, EXTENDED RELEASE ORAL at 07:11

## 2017-02-17 RX ADMIN — Medication 162 MILLIGRAM(S): at 12:00

## 2017-02-17 RX ADMIN — PIPERACILLIN AND TAZOBACTAM 25 GRAM(S): 4; .5 INJECTION, POWDER, LYOPHILIZED, FOR SOLUTION INTRAVENOUS at 15:38

## 2017-02-17 RX ADMIN — MORPHINE SULFATE 2 MILLIGRAM(S): 50 CAPSULE, EXTENDED RELEASE ORAL at 02:35

## 2017-02-17 RX ADMIN — PANTOPRAZOLE SODIUM 40 MILLIGRAM(S): 20 TABLET, DELAYED RELEASE ORAL at 05:27

## 2017-02-17 RX ADMIN — Medication 150 MILLIGRAM(S): at 17:50

## 2017-02-17 RX ADMIN — PIPERACILLIN AND TAZOBACTAM 25 GRAM(S): 4; .5 INJECTION, POWDER, LYOPHILIZED, FOR SOLUTION INTRAVENOUS at 05:30

## 2017-02-17 RX ADMIN — MORPHINE SULFATE 2 MILLIGRAM(S): 50 CAPSULE, EXTENDED RELEASE ORAL at 15:46

## 2017-02-17 RX ADMIN — Medication 1 TABLET(S): at 12:01

## 2017-02-17 RX ADMIN — Medication 150 MILLIGRAM(S): at 05:27

## 2017-02-17 RX ADMIN — Medication 100 MILLIGRAM(S): at 12:01

## 2017-02-17 RX ADMIN — MORPHINE SULFATE 2 MILLIGRAM(S): 50 CAPSULE, EXTENDED RELEASE ORAL at 03:10

## 2017-02-17 RX ADMIN — Medication 1 MILLIGRAM(S): at 12:01

## 2017-02-17 NOTE — DISCHARGE NOTE ADULT - PLAN OF CARE
pain free followup with hemonc for further tx of liver tumor and furter workup continue current managmnet and medications continue home meidcations continue current managmnet and medications ,followup with pulm cons BP monitoring,continue current antihypertensive meds, low salt diet,followup with PMD in 1-2 weeks

## 2017-02-17 NOTE — DISCHARGE NOTE ADULT - CARE PLAN
Principal Discharge DX:	Periumbilical abdominal pain  Goal:	pain free  Instructions for follow-up, activity and diet:	followup with hemonc for further tx of liver tumor and furter workup  Secondary Diagnosis:	Chronic pain with drug dependence  Instructions for follow-up, activity and diet:	continue current managmnet and medications  Secondary Diagnosis:	Chronic alcoholic pancreatitis  Instructions for follow-up, activity and diet:	continue home meidcations  Secondary Diagnosis:	Diabetic neuropathy  Instructions for follow-up, activity and diet:	continue home meidcations  Secondary Diagnosis:	Depression  Instructions for follow-up, activity and diet:	continue home meidcations  Secondary Diagnosis:	Emphysema lung  Instructions for follow-up, activity and diet:	continue current managmnet and medications ,followup with pulm cons  Secondary Diagnosis:	HTN (hypertension)  Instructions for follow-up, activity and diet:	BP monitoring,continue current antihypertensive meds, low salt diet,followup with PMD in 1-2 weeks

## 2017-02-17 NOTE — DISCHARGE NOTE ADULT - MEDICATION SUMMARY - MEDICATIONS TO STOP TAKING
I will STOP taking the medications listed below when I get home from the hospital:    Suboxone 8 mg-2 mg sublingual film  -- 1 each under tongue 3 times a day

## 2017-02-17 NOTE — DISCHARGE NOTE ADULT - CARE PROVIDER_API CALL
Terry West (), Gastroenterology; Internal Medicine  [address] [place] [address]  Phone: [phone]  Fax: [phone]    Frandy Fung (LUPE), Internal Medicine; Medical Oncology  [address] [place] [address]  Phone: [phone]  Fax: [phone]    Caleb Minaya), [facility] Medicine [address] [address]  Phone: [phone]  Fax: [phone]    Elia Kothari), Critical Care Medicine; Internal Medicine; Pulmonary Disease  [address] [place] [address]  Phone: [phone]  Fax: [phone] Terry West (), Gastroenterology; Internal Medicine  [address] [place] [address]  Phone: [phone]  Fax: [phone]    Frandy Fung (LUPE), Internal Medicine; Medical Oncology  [address] [place] [address]  Phone: [phone]  Fax: [phone]    Caleb Minaya), [facility] Medicine [address] [address]  Phone: [phone]  Fax: [phone]    Elia Kothari), Critical Care Medicine; Internal Medicine; Pulmonary Disease  [address] [place] [address]  Phone: [phone]  Fax: [phone]

## 2017-02-17 NOTE — DISCHARGE NOTE ADULT - HOSPITAL COURSE
62 yo Male with hx of DM,HTN,Emphysema, etoh induced pancreatitis came to ER with  epigastric / periumbilical pain for 1 day . Feels similar to prev pancreatitisl. Pt is s/ p antwon. No lower abd pain. Some nausea. No v/d. No cp/sob/palp. No recent REYEZ / easy fatigue. NO numb/ting/focal weak CT abdomen demostarted heaptic billiary tree dilation with suspicion of cholangitis , surg cons was contaced by er and GI cons requested Admit for septic workup and ID evaluation,send blood and urine cx,serial lactate levels,monitor vitals closley,ivfs hydration,monitor urine output and renal profile,iv abx as per id cons 60 yo Male with hx of DM,HTN,Emphysema,HEP C , etoh induced pancreatitis came to ER with  epigastric / periumbilical pain for 1 day . Feels similar to prev pancreatitisl. Pt is s/ p antwon. No lower abd pain. Some nausea. No v/d. No cp/sob/palp. No recent REYEZ / easy fatigue. NO numb/ting/focal weak CT abdomen demostarted heaptic billiary tree dilation with suspicion of cholangitis , surg cons was contaced by er and GI cons requested Admit for septic workup and ID evaluation,send blood and urine cx,serial lactate levels,monitor vitals closley,ivfs hydration,monitor urine output and renal profile,iv abx as per id cons MRI didnt show cholelithisasis but demonstareted large hepatic mass ,likely HCC ,seen by hemonc Dr Olvera and outpatient tx ,probable chemoemolisation vs pall chemotx was recommended Found to have portal vein thrombosis and asa 162 mg on daily basis recommended by hem cons ID eval called due to bactermeia with GPC ,recived iv vanco and zosyn 60 yo Male with hx of DM,HTN,Emphysema,HEP C , etoh induced pancreatitis came to ER with  epigastric / periumbilical pain for 1 day . Feels similar to prev pancreatitisl. Pt is s/ p antwon. No lower abd pain. Some nausea. No v/d. No cp/sob/palp. No recent REYEZ / easy fatigue. NO numb/ting/focal weak CT abdomen demostarted heaptic billiary tree dilation with suspicion of cholangitis , surg cons was contaced by er and GI cons requested Admit for septic workup and ID evaluation,send blood and urine cx,serial lactate levels,monitor vitals closley,ivfs hydration,monitor urine output and renal profile,iv abx as per id cons MRI didnt show cholelithisasis but demonstareted large hepatic mass ,likely HCC ,seen by hemonc Dr Olvera and outpatient tx ,probable chemoemolisation vs pall chemotx was recommended Found to have portal vein thrombosis and asa 162 mg on daily basis recommended by hem cons ID eval called due to bactermeia with GPC ,recived iv vanco and zosyn Cleared for DC by Dr Pacheco and Dr MARIO Henriquez dc by Dr Minaya 2/17 To followup with consultants after DC in 1 week

## 2017-02-17 NOTE — DISCHARGE NOTE ADULT - CARE PROVIDERS DIRECT ADDRESSES
,DirectAddress_Unknown,DirectAddress_Unknown,DirectAddress_Unknown,[email or web address] [email or web address],DirectAddress_Unknown

## 2017-02-17 NOTE — DISCHARGE NOTE ADULT - VISION (WITH CORRECTIVE LENSES IF THE PATIENT USUALLY WEARS THEM):
reading/Partially impaired: cannot see medication labels or newsprint, but can see obstacles in path, and the surrounding layout; can count fingers at arm's length

## 2017-02-17 NOTE — DISCHARGE NOTE ADULT - SECONDARY DIAGNOSIS.
Chronic pain with drug dependence Chronic alcoholic pancreatitis Diabetic neuropathy Depression Emphysema lung HTN (hypertension)

## 2017-02-17 NOTE — DISCHARGE NOTE ADULT - PATIENT PORTAL LINK FT
“You can access the FollowHealth Patient Portal, offered by [facility], by registering with the following website: [email or web address]”

## 2017-02-18 VITALS
DIASTOLIC BLOOD PRESSURE: 79 MMHG | SYSTOLIC BLOOD PRESSURE: 159 MMHG | OXYGEN SATURATION: 93 % | RESPIRATION RATE: 16 BRPM | HEART RATE: 69 BPM | TEMPERATURE: 98 F

## 2017-02-18 LAB
ANION GAP SERPL CALC-SCNC: 6 MMOL/L — SIGNIFICANT CHANGE UP (ref 5–17)
APTT BLD: 35.5 SEC — SIGNIFICANT CHANGE UP (ref 27.5–37.4)
BASOPHILS # BLD AUTO: 0.1 K/UL — SIGNIFICANT CHANGE UP (ref 0–0.2)
BASOPHILS NFR BLD AUTO: 1.5 % — SIGNIFICANT CHANGE UP (ref 0–2)
BUN SERPL-MCNC: 12 MG/DL — SIGNIFICANT CHANGE UP (ref 7–23)
CALCIUM SERPL-MCNC: 8.6 MG/DL — SIGNIFICANT CHANGE UP (ref 8.5–10.1)
CHLORIDE SERPL-SCNC: 102 MMOL/L — SIGNIFICANT CHANGE UP (ref 96–108)
CO2 SERPL-SCNC: 28 MMOL/L — SIGNIFICANT CHANGE UP (ref 22–31)
CREAT SERPL-MCNC: 0.84 MG/DL — SIGNIFICANT CHANGE UP (ref 0.5–1.3)
EOSINOPHIL # BLD AUTO: 0.2 K/UL — SIGNIFICANT CHANGE UP (ref 0–0.5)
EOSINOPHIL NFR BLD AUTO: 3.1 % — SIGNIFICANT CHANGE UP (ref 0–6)
GLUCOSE SERPL-MCNC: 138 MG/DL — HIGH (ref 70–99)
HCT VFR BLD CALC: 36.6 % — LOW (ref 39–50)
HGB BLD-MCNC: 12.3 G/DL — LOW (ref 13–17)
INR BLD: 1.07 RATIO — SIGNIFICANT CHANGE UP (ref 0.88–1.16)
LYMPHOCYTES # BLD AUTO: 1.6 K/UL — SIGNIFICANT CHANGE UP (ref 1–3.3)
LYMPHOCYTES # BLD AUTO: 29.1 % — SIGNIFICANT CHANGE UP (ref 13–44)
MCHC RBC-ENTMCNC: 32.3 PG — SIGNIFICANT CHANGE UP (ref 27–34)
MCHC RBC-ENTMCNC: 33.6 GM/DL — SIGNIFICANT CHANGE UP (ref 32–36)
MCV RBC AUTO: 96.2 FL — SIGNIFICANT CHANGE UP (ref 80–100)
MONOCYTES # BLD AUTO: 0.7 K/UL — SIGNIFICANT CHANGE UP (ref 0–0.9)
MONOCYTES NFR BLD AUTO: 13 % — HIGH (ref 1–9)
NEUTROPHILS # BLD AUTO: 2.9 K/UL — SIGNIFICANT CHANGE UP (ref 1.8–7.4)
NEUTROPHILS NFR BLD AUTO: 53.3 % — SIGNIFICANT CHANGE UP (ref 43–77)
PLATELET # BLD AUTO: 168 K/UL — SIGNIFICANT CHANGE UP (ref 150–400)
POTASSIUM SERPL-MCNC: 4.1 MMOL/L — SIGNIFICANT CHANGE UP (ref 3.5–5.3)
POTASSIUM SERPL-SCNC: 4.1 MMOL/L — SIGNIFICANT CHANGE UP (ref 3.5–5.3)
PROTHROM AB SERPL-ACNC: 11.9 SEC — SIGNIFICANT CHANGE UP (ref 10–13.1)
RBC # BLD: 3.81 M/UL — LOW (ref 4.2–5.8)
RBC # FLD: 13 % — SIGNIFICANT CHANGE UP (ref 10.3–14.5)
SODIUM SERPL-SCNC: 136 MMOL/L — SIGNIFICANT CHANGE UP (ref 135–145)
WBC # BLD: 5.4 K/UL — SIGNIFICANT CHANGE UP (ref 3.8–10.5)
WBC # FLD AUTO: 5.4 K/UL — SIGNIFICANT CHANGE UP (ref 3.8–10.5)

## 2017-02-18 PROCEDURE — 71046 X-RAY EXAM CHEST 2 VIEWS: CPT

## 2017-02-18 PROCEDURE — 86704 HEP B CORE ANTIBODY TOTAL: CPT

## 2017-02-18 PROCEDURE — 84443 ASSAY THYROID STIM HORMONE: CPT

## 2017-02-18 PROCEDURE — 84156 ASSAY OF PROTEIN URINE: CPT

## 2017-02-18 PROCEDURE — 82728 ASSAY OF FERRITIN: CPT

## 2017-02-18 PROCEDURE — 80307 DRUG TEST PRSMV CHEM ANLYZR: CPT

## 2017-02-18 PROCEDURE — 71250 CT THORAX DX C-: CPT

## 2017-02-18 PROCEDURE — 84165 PROTEIN E-PHORESIS SERUM: CPT

## 2017-02-18 PROCEDURE — 87040 BLOOD CULTURE FOR BACTERIA: CPT

## 2017-02-18 PROCEDURE — 86706 HEP B SURFACE ANTIBODY: CPT

## 2017-02-18 PROCEDURE — 99285 EMERGENCY DEPT VISIT HI MDM: CPT | Mod: 25

## 2017-02-18 PROCEDURE — 82746 ASSAY OF FOLIC ACID SERUM: CPT

## 2017-02-18 PROCEDURE — 84145 PROCALCITONIN (PCT): CPT

## 2017-02-18 PROCEDURE — 82607 VITAMIN B-12: CPT

## 2017-02-18 PROCEDURE — 85610 PROTHROMBIN TIME: CPT

## 2017-02-18 PROCEDURE — 86334 IMMUNOFIX E-PHORESIS SERUM: CPT

## 2017-02-18 PROCEDURE — 87522 HEPATITIS C REVRS TRNSCRPJ: CPT

## 2017-02-18 PROCEDURE — 83735 ASSAY OF MAGNESIUM: CPT

## 2017-02-18 PROCEDURE — 86335 IMMUNFIX E-PHORSIS/URINE/CSF: CPT

## 2017-02-18 PROCEDURE — 83550 IRON BINDING TEST: CPT

## 2017-02-18 PROCEDURE — 83036 HEMOGLOBIN GLYCOSYLATED A1C: CPT

## 2017-02-18 PROCEDURE — 87517 HEPATITIS B DNA QUANT: CPT

## 2017-02-18 PROCEDURE — 82150 ASSAY OF AMYLASE: CPT

## 2017-02-18 PROCEDURE — 82105 ALPHA-FETOPROTEIN SERUM: CPT

## 2017-02-18 PROCEDURE — 80048 BASIC METABOLIC PNL TOTAL CA: CPT

## 2017-02-18 PROCEDURE — 87340 HEPATITIS B SURFACE AG IA: CPT

## 2017-02-18 PROCEDURE — A9561: CPT

## 2017-02-18 PROCEDURE — 80202 ASSAY OF VANCOMYCIN: CPT

## 2017-02-18 PROCEDURE — 86301 IMMUNOASSAY TUMOR CA 19-9: CPT

## 2017-02-18 PROCEDURE — 81001 URINALYSIS AUTO W/SCOPE: CPT

## 2017-02-18 PROCEDURE — 93005 ELECTROCARDIOGRAM TRACING: CPT

## 2017-02-18 PROCEDURE — 84155 ASSAY OF PROTEIN SERUM: CPT

## 2017-02-18 PROCEDURE — 74177 CT ABD & PELVIS W/CONTRAST: CPT

## 2017-02-18 PROCEDURE — 87086 URINE CULTURE/COLONY COUNT: CPT

## 2017-02-18 PROCEDURE — 85730 THROMBOPLASTIN TIME PARTIAL: CPT

## 2017-02-18 PROCEDURE — 74183 MRI ABD W/O CNTR FLWD CNTR: CPT

## 2017-02-18 PROCEDURE — 83690 ASSAY OF LIPASE: CPT

## 2017-02-18 PROCEDURE — 83605 ASSAY OF LACTIC ACID: CPT

## 2017-02-18 PROCEDURE — 96375 TX/PRO/DX INJ NEW DRUG ADDON: CPT

## 2017-02-18 PROCEDURE — 80053 COMPREHEN METABOLIC PANEL: CPT

## 2017-02-18 PROCEDURE — 80061 LIPID PANEL: CPT

## 2017-02-18 PROCEDURE — A9579: CPT

## 2017-02-18 PROCEDURE — 85027 COMPLETE CBC AUTOMATED: CPT

## 2017-02-18 PROCEDURE — 96374 THER/PROPH/DIAG INJ IV PUSH: CPT

## 2017-02-18 PROCEDURE — 78306 BONE IMAGING WHOLE BODY: CPT

## 2017-02-18 RX ORDER — IBUPROFEN 200 MG
1 TABLET ORAL
Qty: 20 | Refills: 0 | OUTPATIENT
Start: 2017-02-18 | End: 2017-02-23

## 2017-02-18 RX ORDER — FLUVOXAMINE MALEATE 25 MG/1
1 TABLET ORAL
Qty: 10 | Refills: 0 | OUTPATIENT
Start: 2017-02-18 | End: 2017-02-23

## 2017-02-18 RX ORDER — DOCUSATE SODIUM 100 MG
1 CAPSULE ORAL
Qty: 36 | Refills: 0 | OUTPATIENT
Start: 2017-02-18 | End: 2017-03-02

## 2017-02-18 RX ORDER — FOLIC ACID 0.8 MG
1 TABLET ORAL
Qty: 12 | Refills: 0 | OUTPATIENT
Start: 2017-02-18 | End: 2017-03-02

## 2017-02-18 RX ORDER — ASPIRIN/CALCIUM CARB/MAGNESIUM 324 MG
2 TABLET ORAL
Qty: 60 | Refills: 0 | OUTPATIENT
Start: 2017-02-18 | End: 2017-03-20

## 2017-02-18 RX ORDER — METFORMIN HYDROCHLORIDE 850 MG/1
1 TABLET ORAL
Qty: 60 | Refills: 0 | OUTPATIENT
Start: 2017-02-18 | End: 2017-03-20

## 2017-02-18 RX ORDER — SENNA PLUS 8.6 MG/1
2 TABLET ORAL
Qty: 24 | Refills: 0 | OUTPATIENT
Start: 2017-02-18 | End: 2017-03-02

## 2017-02-18 RX ORDER — OMEPRAZOLE 10 MG/1
1 CAPSULE, DELAYED RELEASE ORAL
Qty: 12 | Refills: 0 | OUTPATIENT
Start: 2017-02-18 | End: 2017-03-02

## 2017-02-18 RX ORDER — TRAZODONE HCL 50 MG
1 TABLET ORAL
Qty: 4 | Refills: 0 | OUTPATIENT
Start: 2017-02-18 | End: 2017-02-22

## 2017-02-18 RX ORDER — THIAMINE MONONITRATE (VIT B1) 100 MG
1 TABLET ORAL
Qty: 12 | Refills: 0 | OUTPATIENT
Start: 2017-02-18 | End: 2017-03-02

## 2017-02-18 RX ADMIN — PANTOPRAZOLE SODIUM 40 MILLIGRAM(S): 20 TABLET, DELAYED RELEASE ORAL at 06:32

## 2017-02-18 RX ADMIN — MORPHINE SULFATE 2 MILLIGRAM(S): 50 CAPSULE, EXTENDED RELEASE ORAL at 08:22

## 2017-02-18 RX ADMIN — Medication 100 MILLIGRAM(S): at 11:30

## 2017-02-18 RX ADMIN — Medication 162 MILLIGRAM(S): at 11:30

## 2017-02-18 RX ADMIN — Medication 1 MILLIGRAM(S): at 11:30

## 2017-02-18 RX ADMIN — Medication 1 TABLET(S): at 11:30

## 2017-02-18 RX ADMIN — Medication 150 MILLIGRAM(S): at 06:32

## 2017-02-18 RX ADMIN — MORPHINE SULFATE 2 MILLIGRAM(S): 50 CAPSULE, EXTENDED RELEASE ORAL at 03:39

## 2017-02-18 RX ADMIN — Medication 150 MILLIGRAM(S): at 14:31

## 2017-02-20 LAB
CULTURE RESULTS: SIGNIFICANT CHANGE UP
CULTURE RESULTS: SIGNIFICANT CHANGE UP
SPECIMEN SOURCE: SIGNIFICANT CHANGE UP
SPECIMEN SOURCE: SIGNIFICANT CHANGE UP

## 2017-02-21 DIAGNOSIS — K76.0 FATTY (CHANGE OF) LIVER, NOT ELSEWHERE CLASSIFIED: ICD-10-CM

## 2017-02-21 DIAGNOSIS — F17.210 NICOTINE DEPENDENCE, CIGARETTES, UNCOMPLICATED: ICD-10-CM

## 2017-02-21 DIAGNOSIS — I81 PORTAL VEIN THROMBOSIS: ICD-10-CM

## 2017-02-21 DIAGNOSIS — D64.9 ANEMIA, UNSPECIFIED: ICD-10-CM

## 2017-02-21 DIAGNOSIS — Z91.81 HISTORY OF FALLING: ICD-10-CM

## 2017-02-21 DIAGNOSIS — E11.9 TYPE 2 DIABETES MELLITUS WITHOUT COMPLICATIONS: ICD-10-CM

## 2017-02-21 DIAGNOSIS — K86.0 ALCOHOL-INDUCED CHRONIC PANCREATITIS: ICD-10-CM

## 2017-02-21 DIAGNOSIS — R16.0 HEPATOMEGALY, NOT ELSEWHERE CLASSIFIED: ICD-10-CM

## 2017-02-21 DIAGNOSIS — C22.0 LIVER CELL CARCINOMA: ICD-10-CM

## 2017-02-21 DIAGNOSIS — E11.40 TYPE 2 DIABETES MELLITUS WITH DIABETIC NEUROPATHY, UNSPECIFIED: ICD-10-CM

## 2017-02-21 DIAGNOSIS — A40.8 OTHER STREPTOCOCCAL SEPSIS: ICD-10-CM

## 2017-02-21 DIAGNOSIS — G89.29 OTHER CHRONIC PAIN: ICD-10-CM

## 2017-02-21 DIAGNOSIS — B18.2 CHRONIC VIRAL HEPATITIS C: ICD-10-CM

## 2017-02-21 DIAGNOSIS — K74.60 UNSPECIFIED CIRRHOSIS OF LIVER: ICD-10-CM

## 2017-02-21 DIAGNOSIS — D69.6 THROMBOCYTOPENIA, UNSPECIFIED: ICD-10-CM

## 2017-02-21 DIAGNOSIS — R01.1 CARDIAC MURMUR, UNSPECIFIED: ICD-10-CM

## 2017-02-21 DIAGNOSIS — F32.9 MAJOR DEPRESSIVE DISORDER, SINGLE EPISODE, UNSPECIFIED: ICD-10-CM

## 2017-02-21 DIAGNOSIS — Z90.49 ACQUIRED ABSENCE OF OTHER SPECIFIED PARTS OF DIGESTIVE TRACT: ICD-10-CM

## 2017-02-21 DIAGNOSIS — J44.9 CHRONIC OBSTRUCTIVE PULMONARY DISEASE, UNSPECIFIED: ICD-10-CM

## 2017-02-21 DIAGNOSIS — I10 ESSENTIAL (PRIMARY) HYPERTENSION: ICD-10-CM

## 2017-02-21 LAB — INTERPRETATION 24H UR IFE-IMP: SIGNIFICANT CHANGE UP

## 2017-02-22 LAB
CULTURE RESULTS: SIGNIFICANT CHANGE UP
SPECIMEN SOURCE: SIGNIFICANT CHANGE UP

## 2017-03-08 ENCOUNTER — APPOINTMENT (OUTPATIENT)
Dept: INTERVENTIONAL RADIOLOGY/VASCULAR | Facility: CLINIC | Age: 62
End: 2017-03-08

## 2017-03-08 VITALS
WEIGHT: 158 LBS | OXYGEN SATURATION: 95 % | SYSTOLIC BLOOD PRESSURE: 155 MMHG | HEIGHT: 73 IN | RESPIRATION RATE: 18 BRPM | HEART RATE: 92 BPM | BODY MASS INDEX: 20.94 KG/M2 | DIASTOLIC BLOOD PRESSURE: 77 MMHG

## 2017-03-08 DIAGNOSIS — Z82.3 FAMILY HISTORY OF STROKE: ICD-10-CM

## 2017-03-08 DIAGNOSIS — Z86.59 PERSONAL HISTORY OF OTHER MENTAL AND BEHAVIORAL DISORDERS: ICD-10-CM

## 2017-03-08 DIAGNOSIS — Z86.69 PERSONAL HISTORY OF OTHER DISEASES OF THE NERVOUS SYSTEM AND SENSE ORGANS: ICD-10-CM

## 2017-03-08 DIAGNOSIS — F17.200 NICOTINE DEPENDENCE, UNSPECIFIED, UNCOMPLICATED: ICD-10-CM

## 2017-03-08 DIAGNOSIS — Z82.49 FAMILY HISTORY OF ISCHEMIC HEART DISEASE AND OTHER DISEASES OF THE CIRCULATORY SYSTEM: ICD-10-CM

## 2017-03-08 RX ORDER — OXYCODONE HYDROCHLORIDE 15 MG/1
15 TABLET ORAL
Refills: 0 | Status: ACTIVE | COMMUNITY

## 2017-03-08 RX ORDER — RANITIDINE HCL 150 MG
CAPSULE ORAL
Refills: 0 | Status: ACTIVE | COMMUNITY

## 2017-03-10 ENCOUNTER — OUTPATIENT (OUTPATIENT)
Dept: OUTPATIENT SERVICES | Facility: HOSPITAL | Age: 62
LOS: 1 days | End: 2017-03-10
Payer: MEDICARE

## 2017-03-10 VITALS
WEIGHT: 151.9 LBS | DIASTOLIC BLOOD PRESSURE: 80 MMHG | SYSTOLIC BLOOD PRESSURE: 144 MMHG | HEART RATE: 73 BPM | TEMPERATURE: 97 F | OXYGEN SATURATION: 98 % | HEIGHT: 72 IN | RESPIRATION RATE: 16 BRPM

## 2017-03-10 DIAGNOSIS — E11.9 TYPE 2 DIABETES MELLITUS WITHOUT COMPLICATIONS: ICD-10-CM

## 2017-03-10 DIAGNOSIS — C22.9 MALIGNANT NEOPLASM OF LIVER, NOT SPECIFIED AS PRIMARY OR SECONDARY: ICD-10-CM

## 2017-03-10 DIAGNOSIS — K76.9 LIVER DISEASE, UNSPECIFIED: ICD-10-CM

## 2017-03-10 LAB
ALBUMIN SERPL ELPH-MCNC: 3.3 G/DL — SIGNIFICANT CHANGE UP (ref 3.3–5)
ALP SERPL-CCNC: 264 U/L — HIGH (ref 40–120)
ALT FLD-CCNC: 27 U/L — SIGNIFICANT CHANGE UP (ref 4–41)
APTT BLD: 34.6 SEC — SIGNIFICANT CHANGE UP (ref 27.5–37.4)
AST SERPL-CCNC: 49 U/L — HIGH (ref 4–40)
BILIRUB DIRECT SERPL-MCNC: 0.2 MG/DL — SIGNIFICANT CHANGE UP (ref 0.1–0.2)
BILIRUB SERPL-MCNC: 0.5 MG/DL — SIGNIFICANT CHANGE UP (ref 0.2–1.2)
BILIRUB SERPL-MCNC: 0.9 MG/DL — SIGNIFICANT CHANGE UP (ref 0.2–1.2)
BUN SERPL-MCNC: 19 MG/DL — SIGNIFICANT CHANGE UP (ref 7–23)
CALCIUM SERPL-MCNC: 9 MG/DL — SIGNIFICANT CHANGE UP (ref 8.4–10.5)
CHLORIDE SERPL-SCNC: 99 MMOL/L — SIGNIFICANT CHANGE UP (ref 98–107)
CO2 SERPL-SCNC: 27 MMOL/L — SIGNIFICANT CHANGE UP (ref 22–31)
CREAT SERPL-MCNC: 0.71 MG/DL — SIGNIFICANT CHANGE UP (ref 0.5–1.3)
GLUCOSE SERPL-MCNC: 161 MG/DL — HIGH (ref 70–99)
HBA1C BLD-MCNC: 7.1 % — HIGH (ref 4–5.6)
HCT VFR BLD CALC: 35.4 % — LOW (ref 39–50)
HGB BLD-MCNC: 11.9 G/DL — LOW (ref 13–17)
INR BLD: 0.98 — SIGNIFICANT CHANGE UP (ref 0.87–1.18)
MCHC RBC-ENTMCNC: 32 PG — SIGNIFICANT CHANGE UP (ref 27–34)
MCHC RBC-ENTMCNC: 33.6 % — SIGNIFICANT CHANGE UP (ref 32–36)
MCV RBC AUTO: 95.2 FL — SIGNIFICANT CHANGE UP (ref 80–100)
PLATELET # BLD AUTO: 157 K/UL — SIGNIFICANT CHANGE UP (ref 150–400)
PMV BLD: 10.5 FL — SIGNIFICANT CHANGE UP (ref 7–13)
POTASSIUM SERPL-MCNC: 4.4 MMOL/L — SIGNIFICANT CHANGE UP (ref 3.5–5.3)
POTASSIUM SERPL-SCNC: 4.4 MMOL/L — SIGNIFICANT CHANGE UP (ref 3.5–5.3)
PROT SERPL-MCNC: 8.9 G/DL — HIGH (ref 6–8.3)
PROTHROM AB SERPL-ACNC: 11.2 SEC — SIGNIFICANT CHANGE UP (ref 10–13.1)
RBC # BLD: 3.72 M/UL — LOW (ref 4.2–5.8)
RBC # FLD: 14.1 % — SIGNIFICANT CHANGE UP (ref 10.3–14.5)
SODIUM SERPL-SCNC: 137 MMOL/L — SIGNIFICANT CHANGE UP (ref 135–145)
WBC # BLD: 4.9 K/UL — SIGNIFICANT CHANGE UP (ref 3.8–10.5)
WBC # FLD AUTO: 4.9 K/UL — SIGNIFICANT CHANGE UP (ref 3.8–10.5)

## 2017-03-10 PROCEDURE — 93010 ELECTROCARDIOGRAM REPORT: CPT

## 2017-03-10 NOTE — H&P PST ADULT - PMH
Chronic alcoholic pancreatitis    Chronic pain with drug dependence  On suboxone now  Depression    Diabetic neuropathy    Emphysema lung    Hepatitis C    HTN (hypertension)    Smoker    Type 2 diabetes mellitus    Weight loss, unintentional  45 lbs in 1 yr Chronic alcoholic pancreatitis    Chronic pain with drug dependence  On suboxone now  Depression    Diabetic neuropathy    Emphysema lung    Hepatitis C    HTN (hypertension)  no medications  Smoker    Type 2 diabetes mellitus  IDDM  Weight loss, unintentional  45 lbs in 1 yr

## 2017-03-10 NOTE — H&P PST ADULT - HISTORY OF PRESENT ILLNESS
61 yr old male with history of Chronic alcoholic Pancreatitis, Hep C, Type 2 IDDM with hx of abdominal pain, dx with liver cancer on imaging. Now scheduled for Mapping and [facility] 3/16/17.

## 2017-03-10 NOTE — H&P PST ADULT - NSANTHOSAYNRD_GEN_A_CORE
No. FEI screening performed.  STOP BANG Legend: 0-2 = LOW Risk; 3-4 = INTERMEDIATE Risk; 5-8 = HIGH Risk

## 2017-03-16 ENCOUNTER — APPOINTMENT (OUTPATIENT)
Dept: NUCLEAR MEDICINE | Facility: HOSPITAL | Age: 62
End: 2017-03-16

## 2017-03-16 ENCOUNTER — OUTPATIENT (OUTPATIENT)
Dept: OUTPATIENT SERVICES | Facility: HOSPITAL | Age: 62
LOS: 1 days | End: 2017-03-16
Payer: MEDICARE

## 2017-03-16 DIAGNOSIS — R16.0 HEPATOMEGALY, NOT ELSEWHERE CLASSIFIED: ICD-10-CM

## 2017-03-16 PROCEDURE — 36248 INS CATH ABD/L-EXT ART ADDL: CPT

## 2017-03-16 PROCEDURE — 36245 INS CATH ABD/L-EXT ART 1ST: CPT | Mod: 59

## 2017-03-16 PROCEDURE — 76937 US GUIDE VASCULAR ACCESS: CPT | Mod: 26

## 2017-03-16 PROCEDURE — 36247 INS CATH ABD/L-EXT ART 3RD: CPT

## 2017-03-16 PROCEDURE — 78205: CPT | Mod: 26

## 2017-03-16 PROCEDURE — 75726 ARTERY X-RAYS ABDOMEN: CPT | Mod: 26,76,59

## 2017-03-16 PROCEDURE — 78201 LIVER IMAGING STATIC ONLY: CPT | Mod: 26

## 2017-03-16 PROCEDURE — 75774 ARTERY X-RAY EACH VESSEL: CPT | Mod: 26

## 2017-03-21 DIAGNOSIS — D49.0 NEOPLASM OF UNSPECIFIED BEHAVIOR OF DIGESTIVE SYSTEM: ICD-10-CM

## 2017-03-27 ENCOUNTER — OUTPATIENT (OUTPATIENT)
Dept: OUTPATIENT SERVICES | Facility: HOSPITAL | Age: 62
LOS: 1 days | End: 2017-03-27
Payer: SELF-PAY

## 2017-03-27 ENCOUNTER — APPOINTMENT (OUTPATIENT)
Dept: NUCLEAR MEDICINE | Facility: HOSPITAL | Age: 62
End: 2017-03-27

## 2017-03-27 DIAGNOSIS — K76.9 LIVER DISEASE, UNSPECIFIED: ICD-10-CM

## 2017-03-27 LAB
AFP-TM SERPL-MCNC: 1574 NG/ML — HIGH
ALBUMIN SERPL ELPH-MCNC: 3.6 G/DL — SIGNIFICANT CHANGE UP (ref 3.3–5)
ALP SERPL-CCNC: 399 U/L — HIGH (ref 40–120)
ALT FLD-CCNC: 40 U/L — SIGNIFICANT CHANGE UP (ref 4–41)
APTT BLD: 36.3 SEC — SIGNIFICANT CHANGE UP (ref 27.5–37.4)
AST SERPL-CCNC: 84 U/L — HIGH (ref 4–40)
BASOPHILS # BLD AUTO: 0.03 K/UL — SIGNIFICANT CHANGE UP (ref 0–0.2)
BASOPHILS NFR BLD AUTO: 0.4 % — SIGNIFICANT CHANGE UP (ref 0–2)
BILIRUB SERPL-MCNC: 0.6 MG/DL — SIGNIFICANT CHANGE UP (ref 0.2–1.2)
BUN SERPL-MCNC: 16 MG/DL — SIGNIFICANT CHANGE UP (ref 7–23)
CALCIUM SERPL-MCNC: 9.2 MG/DL — SIGNIFICANT CHANGE UP (ref 8.4–10.5)
CHLORIDE SERPL-SCNC: 96 MMOL/L — LOW (ref 98–107)
CO2 SERPL-SCNC: 28 MMOL/L — SIGNIFICANT CHANGE UP (ref 22–31)
CREAT SERPL-MCNC: 0.91 MG/DL — SIGNIFICANT CHANGE UP (ref 0.5–1.3)
EOSINOPHIL # BLD AUTO: 0.11 K/UL — SIGNIFICANT CHANGE UP (ref 0–0.5)
EOSINOPHIL NFR BLD AUTO: 1.5 % — SIGNIFICANT CHANGE UP (ref 0–6)
GLUCOSE SERPL-MCNC: 144 MG/DL — HIGH (ref 70–99)
HCT VFR BLD CALC: 37.8 % — LOW (ref 39–50)
HGB BLD-MCNC: 13 G/DL — SIGNIFICANT CHANGE UP (ref 13–17)
IMM GRANULOCYTES NFR BLD AUTO: 0.3 % — SIGNIFICANT CHANGE UP (ref 0–1.5)
INR BLD: 1.04 — SIGNIFICANT CHANGE UP (ref 0.88–1.17)
LYMPHOCYTES # BLD AUTO: 1.32 K/UL — SIGNIFICANT CHANGE UP (ref 1–3.3)
LYMPHOCYTES # BLD AUTO: 17.8 % — SIGNIFICANT CHANGE UP (ref 13–44)
MCHC RBC-ENTMCNC: 32.7 PG — SIGNIFICANT CHANGE UP (ref 27–34)
MCHC RBC-ENTMCNC: 34.4 % — SIGNIFICANT CHANGE UP (ref 32–36)
MCV RBC AUTO: 95 FL — SIGNIFICANT CHANGE UP (ref 80–100)
MONOCYTES # BLD AUTO: 0.96 K/UL — HIGH (ref 0–0.9)
MONOCYTES NFR BLD AUTO: 13 % — SIGNIFICANT CHANGE UP (ref 2–14)
NEUTROPHILS # BLD AUTO: 4.96 K/UL — SIGNIFICANT CHANGE UP (ref 1.8–7.4)
NEUTROPHILS NFR BLD AUTO: 67 % — SIGNIFICANT CHANGE UP (ref 43–77)
PLATELET # BLD AUTO: 189 K/UL — SIGNIFICANT CHANGE UP (ref 150–400)
PMV BLD: 10.9 FL — SIGNIFICANT CHANGE UP (ref 7–13)
POTASSIUM SERPL-MCNC: 4.4 MMOL/L — SIGNIFICANT CHANGE UP (ref 3.5–5.3)
POTASSIUM SERPL-SCNC: 4.4 MMOL/L — SIGNIFICANT CHANGE UP (ref 3.5–5.3)
PROT SERPL-MCNC: 9.2 G/DL — HIGH (ref 6–8.3)
PROTHROM AB SERPL-ACNC: 11.7 SEC — SIGNIFICANT CHANGE UP (ref 9.8–13.1)
RBC # BLD: 3.98 M/UL — LOW (ref 4.2–5.8)
RBC # FLD: 14.3 % — SIGNIFICANT CHANGE UP (ref 10.3–14.5)
SODIUM SERPL-SCNC: 135 MMOL/L — SIGNIFICANT CHANGE UP (ref 135–145)
WBC # BLD: 7.4 K/UL — SIGNIFICANT CHANGE UP (ref 3.8–10.5)
WBC # FLD AUTO: 7.4 K/UL — SIGNIFICANT CHANGE UP (ref 3.8–10.5)

## 2017-03-27 PROCEDURE — 76937 US GUIDE VASCULAR ACCESS: CPT | Mod: 26

## 2017-03-27 PROCEDURE — 78205: CPT | Mod: 26

## 2017-03-27 PROCEDURE — 36247 INS CATH ABD/L-EXT ART 3RD: CPT

## 2017-03-27 PROCEDURE — 37243 VASC EMBOLIZE/OCCLUDE ORGAN: CPT

## 2017-03-27 PROCEDURE — 79445 NUCLEAR RX INTRA-ARTERIAL: CPT | Mod: 26

## 2017-04-03 DIAGNOSIS — D49.0 NEOPLASM OF UNSPECIFIED BEHAVIOR OF DIGESTIVE SYSTEM: ICD-10-CM

## 2017-04-03 DIAGNOSIS — K74.60 UNSPECIFIED CIRRHOSIS OF LIVER: ICD-10-CM

## 2017-04-03 DIAGNOSIS — B19.20 UNSPECIFIED VIRAL HEPATITIS C WITHOUT HEPATIC COMA: ICD-10-CM

## 2017-04-13 ENCOUNTER — CHART COPY (OUTPATIENT)
Age: 62
End: 2017-04-13

## 2017-04-23 ENCOUNTER — FORM ENCOUNTER (OUTPATIENT)
Age: 62
End: 2017-04-23

## 2017-04-24 ENCOUNTER — OUTPATIENT (OUTPATIENT)
Dept: OUTPATIENT SERVICES | Facility: HOSPITAL | Age: 62
LOS: 1 days | End: 2017-04-24
Payer: MEDICARE

## 2017-04-24 ENCOUNTER — APPOINTMENT (OUTPATIENT)
Dept: MRI IMAGING | Facility: CLINIC | Age: 62
End: 2017-04-24

## 2017-04-24 DIAGNOSIS — R16.0 HEPATOMEGALY, NOT ELSEWHERE CLASSIFIED: ICD-10-CM

## 2017-04-25 PROCEDURE — A9585: CPT

## 2017-04-25 PROCEDURE — 82565 ASSAY OF CREATININE: CPT

## 2017-04-25 PROCEDURE — 74183 MRI ABD W/O CNTR FLWD CNTR: CPT

## 2017-04-26 ENCOUNTER — APPOINTMENT (OUTPATIENT)
Dept: INTERVENTIONAL RADIOLOGY/VASCULAR | Facility: CLINIC | Age: 62
End: 2017-04-26

## 2017-04-27 ENCOUNTER — LABORATORY RESULT (OUTPATIENT)
Age: 62
End: 2017-04-27

## 2017-05-10 ENCOUNTER — APPOINTMENT (OUTPATIENT)
Dept: INTERVENTIONAL RADIOLOGY/VASCULAR | Facility: CLINIC | Age: 62
End: 2017-05-10

## 2017-05-10 VITALS
WEIGHT: 154 LBS | SYSTOLIC BLOOD PRESSURE: 161 MMHG | HEIGHT: 73 IN | BODY MASS INDEX: 20.41 KG/M2 | OXYGEN SATURATION: 95 % | DIASTOLIC BLOOD PRESSURE: 85 MMHG | HEART RATE: 108 BPM | RESPIRATION RATE: 18 BRPM

## 2017-05-10 DIAGNOSIS — R11.0 NAUSEA: ICD-10-CM

## 2017-05-10 RX ORDER — ONDANSETRON 4 MG/1
4 TABLET ORAL
Qty: 12 | Refills: 0 | Status: DISCONTINUED | COMMUNITY
Start: 2017-05-10 | End: 2017-05-10

## 2017-05-10 RX ORDER — ONDANSETRON 4 MG/1
4 TABLET ORAL
Qty: 12 | Refills: 0 | Status: ACTIVE | COMMUNITY
Start: 2017-05-10 | End: 1900-01-01

## 2017-05-15 ENCOUNTER — CHART COPY (OUTPATIENT)
Age: 62
End: 2017-05-15

## 2017-05-17 ENCOUNTER — FORM ENCOUNTER (OUTPATIENT)
Age: 62
End: 2017-05-17

## 2017-05-18 ENCOUNTER — OUTPATIENT (OUTPATIENT)
Dept: OUTPATIENT SERVICES | Facility: HOSPITAL | Age: 62
LOS: 1 days | End: 2017-05-18
Payer: SELF-PAY

## 2017-05-18 ENCOUNTER — APPOINTMENT (OUTPATIENT)
Dept: NUCLEAR MEDICINE | Facility: HOSPITAL | Age: 62
End: 2017-05-18

## 2017-05-18 DIAGNOSIS — R16.0 HEPATOMEGALY, NOT ELSEWHERE CLASSIFIED: ICD-10-CM

## 2017-05-18 LAB
AFP-TM SERPL-MCNC: 433.5 NG/ML — HIGH
ALBUMIN SERPL ELPH-MCNC: 3.3 G/DL — SIGNIFICANT CHANGE UP (ref 3.3–5)
ALP SERPL-CCNC: 357 U/L — HIGH (ref 40–120)
ALT FLD-CCNC: 44 U/L — HIGH (ref 4–41)
APTT BLD: 37.5 SEC — HIGH (ref 27.5–37.4)
AST SERPL-CCNC: 61 U/L — HIGH (ref 4–40)
BASOPHILS # BLD AUTO: 0.01 K/UL — SIGNIFICANT CHANGE UP (ref 0–0.2)
BASOPHILS NFR BLD AUTO: 0.2 % — SIGNIFICANT CHANGE UP (ref 0–2)
BILIRUB SERPL-MCNC: 1.8 MG/DL — HIGH (ref 0.2–1.2)
BUN SERPL-MCNC: 16 MG/DL — SIGNIFICANT CHANGE UP (ref 7–23)
CALCIUM SERPL-MCNC: 9.3 MG/DL — SIGNIFICANT CHANGE UP (ref 8.4–10.5)
CHLORIDE SERPL-SCNC: 98 MMOL/L — SIGNIFICANT CHANGE UP (ref 98–107)
CO2 SERPL-SCNC: 28 MMOL/L — SIGNIFICANT CHANGE UP (ref 22–31)
CREAT SERPL-MCNC: 0.82 MG/DL — SIGNIFICANT CHANGE UP (ref 0.5–1.3)
EOSINOPHIL # BLD AUTO: 0.1 K/UL — SIGNIFICANT CHANGE UP (ref 0–0.5)
EOSINOPHIL NFR BLD AUTO: 1.7 % — SIGNIFICANT CHANGE UP (ref 0–6)
GLUCOSE SERPL-MCNC: 84 MG/DL — SIGNIFICANT CHANGE UP (ref 70–99)
HCT VFR BLD CALC: 38.6 % — LOW (ref 39–50)
HGB BLD-MCNC: 12.8 G/DL — LOW (ref 13–17)
IMM GRANULOCYTES NFR BLD AUTO: 0.3 % — SIGNIFICANT CHANGE UP (ref 0–1.5)
INR BLD: 1.04 — SIGNIFICANT CHANGE UP (ref 0.88–1.17)
LYMPHOCYTES # BLD AUTO: 0.51 K/UL — LOW (ref 1–3.3)
LYMPHOCYTES # BLD AUTO: 8.5 % — LOW (ref 13–44)
MCHC RBC-ENTMCNC: 32.1 PG — SIGNIFICANT CHANGE UP (ref 27–34)
MCHC RBC-ENTMCNC: 33.2 % — SIGNIFICANT CHANGE UP (ref 32–36)
MCV RBC AUTO: 96.7 FL — SIGNIFICANT CHANGE UP (ref 80–100)
MONOCYTES # BLD AUTO: 0.44 K/UL — SIGNIFICANT CHANGE UP (ref 0–0.9)
MONOCYTES NFR BLD AUTO: 7.4 % — SIGNIFICANT CHANGE UP (ref 2–14)
NEUTROPHILS # BLD AUTO: 4.89 K/UL — SIGNIFICANT CHANGE UP (ref 1.8–7.4)
NEUTROPHILS NFR BLD AUTO: 81.9 % — HIGH (ref 43–77)
PLATELET # BLD AUTO: 148 K/UL — LOW (ref 150–400)
PMV BLD: 11.3 FL — SIGNIFICANT CHANGE UP (ref 7–13)
POTASSIUM SERPL-MCNC: 4.2 MMOL/L — SIGNIFICANT CHANGE UP (ref 3.5–5.3)
POTASSIUM SERPL-SCNC: 4.2 MMOL/L — SIGNIFICANT CHANGE UP (ref 3.5–5.3)
PROT SERPL-MCNC: 9.9 G/DL — HIGH (ref 6–8.3)
PROTHROM AB SERPL-ACNC: 11.7 SEC — SIGNIFICANT CHANGE UP (ref 9.8–13.1)
RBC # BLD: 3.99 M/UL — LOW (ref 4.2–5.8)
RBC # FLD: 14.8 % — HIGH (ref 10.3–14.5)
SODIUM SERPL-SCNC: 137 MMOL/L — SIGNIFICANT CHANGE UP (ref 135–145)
WBC # BLD: 5.97 K/UL — SIGNIFICANT CHANGE UP (ref 3.8–10.5)
WBC # FLD AUTO: 5.97 K/UL — SIGNIFICANT CHANGE UP (ref 3.8–10.5)

## 2017-05-18 PROCEDURE — 37243 VASC EMBOLIZE/OCCLUDE ORGAN: CPT

## 2017-05-18 PROCEDURE — 36247 INS CATH ABD/L-EXT ART 3RD: CPT

## 2017-05-18 PROCEDURE — 76937 US GUIDE VASCULAR ACCESS: CPT | Mod: 26

## 2017-05-18 PROCEDURE — 36248 INS CATH ABD/L-EXT ART ADDL: CPT

## 2017-05-18 PROCEDURE — 79445 NUCLEAR RX INTRA-ARTERIAL: CPT | Mod: 26

## 2017-05-18 PROCEDURE — 78205: CPT | Mod: 26

## 2017-05-23 ENCOUNTER — EMERGENCY (EMERGENCY)
Facility: HOSPITAL | Age: 62
LOS: 1 days | Discharge: HOME | End: 2017-05-23
Attending: EMERGENCY MEDICINE | Admitting: EMERGENCY MEDICINE
Payer: MEDICARE

## 2017-05-23 VITALS
HEIGHT: 73 IN | TEMPERATURE: 99 F | SYSTOLIC BLOOD PRESSURE: 170 MMHG | WEIGHT: 160.06 LBS | HEART RATE: 110 BPM | OXYGEN SATURATION: 98 % | RESPIRATION RATE: 15 BRPM | DIASTOLIC BLOOD PRESSURE: 94 MMHG

## 2017-05-23 VITALS
DIASTOLIC BLOOD PRESSURE: 80 MMHG | SYSTOLIC BLOOD PRESSURE: 161 MMHG | RESPIRATION RATE: 18 BRPM | HEART RATE: 88 BPM | TEMPERATURE: 98 F | OXYGEN SATURATION: 98 %

## 2017-05-23 DIAGNOSIS — C22.9 MALIGNANT NEOPLASM OF LIVER, NOT SPECIFIED AS PRIMARY OR SECONDARY: ICD-10-CM

## 2017-05-23 DIAGNOSIS — R10.13 EPIGASTRIC PAIN: ICD-10-CM

## 2017-05-23 DIAGNOSIS — F17.210 NICOTINE DEPENDENCE, CIGARETTES, UNCOMPLICATED: ICD-10-CM

## 2017-05-23 DIAGNOSIS — E11.9 TYPE 2 DIABETES MELLITUS WITHOUT COMPLICATIONS: ICD-10-CM

## 2017-05-23 DIAGNOSIS — F10.10 ALCOHOL ABUSE, UNCOMPLICATED: ICD-10-CM

## 2017-05-23 DIAGNOSIS — F11.10 OPIOID ABUSE, UNCOMPLICATED: ICD-10-CM

## 2017-05-23 DIAGNOSIS — Z79.4 LONG TERM (CURRENT) USE OF INSULIN: ICD-10-CM

## 2017-05-23 DIAGNOSIS — J44.9 CHRONIC OBSTRUCTIVE PULMONARY DISEASE, UNSPECIFIED: ICD-10-CM

## 2017-05-23 DIAGNOSIS — I10 ESSENTIAL (PRIMARY) HYPERTENSION: ICD-10-CM

## 2017-05-23 DIAGNOSIS — K86.0 ALCOHOL-INDUCED CHRONIC PANCREATITIS: ICD-10-CM

## 2017-05-23 LAB
ALBUMIN SERPL ELPH-MCNC: 2.5 G/DL — LOW (ref 3.3–5)
ALP SERPL-CCNC: 325 U/L — HIGH (ref 40–120)
ALT FLD-CCNC: 38 U/L — SIGNIFICANT CHANGE UP (ref 12–78)
AMYLASE P1 CFR SERPL: 94 U/L — SIGNIFICANT CHANGE UP (ref 25–115)
ANION GAP SERPL CALC-SCNC: 7 MMOL/L — SIGNIFICANT CHANGE UP (ref 5–17)
APTT BLD: 34.7 SEC — SIGNIFICANT CHANGE UP (ref 27.5–37.4)
AST SERPL-CCNC: 57 U/L — HIGH (ref 15–37)
BASOPHILS # BLD AUTO: 0 K/UL — SIGNIFICANT CHANGE UP (ref 0–0.2)
BASOPHILS NFR BLD AUTO: 0.7 % — SIGNIFICANT CHANGE UP (ref 0–2)
BILIRUB SERPL-MCNC: 0.8 MG/DL — SIGNIFICANT CHANGE UP (ref 0.2–1.2)
BUN SERPL-MCNC: 15 MG/DL — SIGNIFICANT CHANGE UP (ref 7–23)
CALCIUM SERPL-MCNC: 8.4 MG/DL — LOW (ref 8.5–10.1)
CHLORIDE SERPL-SCNC: 99 MMOL/L — SIGNIFICANT CHANGE UP (ref 96–108)
CO2 SERPL-SCNC: 27 MMOL/L — SIGNIFICANT CHANGE UP (ref 22–31)
CREAT SERPL-MCNC: 0.89 MG/DL — SIGNIFICANT CHANGE UP (ref 0.5–1.3)
EOSINOPHIL # BLD AUTO: 0 K/UL — SIGNIFICANT CHANGE UP (ref 0–0.5)
EOSINOPHIL NFR BLD AUTO: 0.8 % — SIGNIFICANT CHANGE UP (ref 0–6)
GLUCOSE SERPL-MCNC: 287 MG/DL — HIGH (ref 70–99)
HCT VFR BLD CALC: 40.2 % — SIGNIFICANT CHANGE UP (ref 39–50)
HGB BLD-MCNC: 13.2 G/DL — SIGNIFICANT CHANGE UP (ref 13–17)
INR BLD: 1.13 RATIO — SIGNIFICANT CHANGE UP (ref 0.88–1.16)
LACTATE SERPL-SCNC: 1.8 MMOL/L — SIGNIFICANT CHANGE UP (ref 0.7–2)
LIDOCAIN IGE QN: 86 U/L — SIGNIFICANT CHANGE UP (ref 73–393)
LYMPHOCYTES # BLD AUTO: 0.2 K/UL — LOW (ref 1–3.3)
LYMPHOCYTES # BLD AUTO: 5.5 % — LOW (ref 13–44)
MCHC RBC-ENTMCNC: 32.8 GM/DL — SIGNIFICANT CHANGE UP (ref 32–36)
MCHC RBC-ENTMCNC: 32.8 PG — SIGNIFICANT CHANGE UP (ref 27–34)
MCV RBC AUTO: 99.9 FL — SIGNIFICANT CHANGE UP (ref 80–100)
MONOCYTES # BLD AUTO: 0.4 K/UL — SIGNIFICANT CHANGE UP (ref 0–0.9)
MONOCYTES NFR BLD AUTO: 8.7 % — SIGNIFICANT CHANGE UP (ref 1–9)
NEUTROPHILS # BLD AUTO: 3.7 K/UL — SIGNIFICANT CHANGE UP (ref 1.8–7.4)
NEUTROPHILS NFR BLD AUTO: 84.3 % — HIGH (ref 43–77)
PLATELET # BLD AUTO: 140 K/UL — LOW (ref 150–400)
POTASSIUM SERPL-MCNC: 4 MMOL/L — SIGNIFICANT CHANGE UP (ref 3.5–5.3)
POTASSIUM SERPL-SCNC: 4 MMOL/L — SIGNIFICANT CHANGE UP (ref 3.5–5.3)
PROT SERPL-MCNC: 9.4 G/DL — HIGH (ref 6–8.3)
PROTHROM AB SERPL-ACNC: 12.4 SEC — SIGNIFICANT CHANGE UP (ref 9.8–12.7)
RBC # BLD: 4.02 M/UL — LOW (ref 4.2–5.8)
RBC # FLD: 13.8 % — SIGNIFICANT CHANGE UP (ref 10.3–14.5)
SODIUM SERPL-SCNC: 133 MMOL/L — LOW (ref 135–145)
WBC # BLD: 4.4 K/UL — SIGNIFICANT CHANGE UP (ref 3.8–10.5)
WBC # FLD AUTO: 4.4 K/UL — SIGNIFICANT CHANGE UP (ref 3.8–10.5)

## 2017-05-23 PROCEDURE — 85610 PROTHROMBIN TIME: CPT

## 2017-05-23 PROCEDURE — 82150 ASSAY OF AMYLASE: CPT

## 2017-05-23 PROCEDURE — 83605 ASSAY OF LACTIC ACID: CPT

## 2017-05-23 PROCEDURE — 96375 TX/PRO/DX INJ NEW DRUG ADDON: CPT

## 2017-05-23 PROCEDURE — 74177 CT ABD & PELVIS W/CONTRAST: CPT

## 2017-05-23 PROCEDURE — 96374 THER/PROPH/DIAG INJ IV PUSH: CPT

## 2017-05-23 PROCEDURE — 85730 THROMBOPLASTIN TIME PARTIAL: CPT

## 2017-05-23 PROCEDURE — 93005 ELECTROCARDIOGRAM TRACING: CPT

## 2017-05-23 PROCEDURE — 85027 COMPLETE CBC AUTOMATED: CPT

## 2017-05-23 PROCEDURE — 80053 COMPREHEN METABOLIC PANEL: CPT

## 2017-05-23 PROCEDURE — 99284 EMERGENCY DEPT VISIT MOD MDM: CPT | Mod: 25

## 2017-05-23 PROCEDURE — 83690 ASSAY OF LIPASE: CPT

## 2017-05-23 PROCEDURE — 74177 CT ABD & PELVIS W/CONTRAST: CPT | Mod: 26

## 2017-05-23 PROCEDURE — 99285 EMERGENCY DEPT VISIT HI MDM: CPT

## 2017-05-23 RX ORDER — HYDROMORPHONE HYDROCHLORIDE 2 MG/ML
0.5 INJECTION INTRAMUSCULAR; INTRAVENOUS; SUBCUTANEOUS ONCE
Qty: 0 | Refills: 0 | Status: DISCONTINUED | OUTPATIENT
Start: 2017-05-23 | End: 2017-05-23

## 2017-05-23 RX ORDER — ONDANSETRON 8 MG/1
4 TABLET, FILM COATED ORAL ONCE
Qty: 0 | Refills: 0 | Status: COMPLETED | OUTPATIENT
Start: 2017-05-23 | End: 2017-05-23

## 2017-05-23 RX ORDER — IOHEXOL 300 MG/ML
30 INJECTION, SOLUTION INTRAVENOUS ONCE
Qty: 0 | Refills: 0 | Status: COMPLETED | OUTPATIENT
Start: 2017-05-23 | End: 2017-05-23

## 2017-05-23 RX ORDER — SODIUM CHLORIDE 9 MG/ML
1000 INJECTION INTRAMUSCULAR; INTRAVENOUS; SUBCUTANEOUS ONCE
Qty: 0 | Refills: 0 | Status: COMPLETED | OUTPATIENT
Start: 2017-05-23 | End: 2017-05-23

## 2017-05-23 RX ADMIN — ONDANSETRON 4 MILLIGRAM(S): 8 TABLET, FILM COATED ORAL at 10:34

## 2017-05-23 RX ADMIN — IOHEXOL 30 MILLILITER(S): 300 INJECTION, SOLUTION INTRAVENOUS at 10:34

## 2017-05-23 RX ADMIN — SODIUM CHLORIDE 1000 MILLILITER(S): 9 INJECTION INTRAMUSCULAR; INTRAVENOUS; SUBCUTANEOUS at 10:00

## 2017-05-23 RX ADMIN — HYDROMORPHONE HYDROCHLORIDE 0.5 MILLIGRAM(S): 2 INJECTION INTRAMUSCULAR; INTRAVENOUS; SUBCUTANEOUS at 10:34

## 2017-05-23 RX ADMIN — HYDROMORPHONE HYDROCHLORIDE 0.5 MILLIGRAM(S): 2 INJECTION INTRAMUSCULAR; INTRAVENOUS; SUBCUTANEOUS at 12:47

## 2017-05-23 NOTE — ED PROVIDER NOTE - CHPI ED SYMPTOMS NEG
no fever/no abdominal distension/no burning urination/no blood in stool/no hematuria/no dysuria/no vomiting/no diarrhea/no chills

## 2017-05-23 NOTE — ED ADULT NURSE NOTE - OBJECTIVE STATEMENT
Received the patient in the Er. Patient is alert and oriented. Skin warm and dry. Patient has H/O liver Cancer. Jaundiced skin. C/O abdominal pain.

## 2017-05-23 NOTE — ED ADULT NURSE NOTE - PMH
Chronic alcoholic pancreatitis    Chronic pain with drug dependence  On suboxone now  Depression    Diabetic neuropathy    Emphysema lung    Hepatitis C    HTN (hypertension)  no medications  Liver cancer    Smoker    Type 2 diabetes mellitus  IDDM  Weight loss, unintentional  45 lbs in 1 yr

## 2017-05-23 NOTE — ED PROVIDER NOTE - CONSTITUTIONAL, MLM
normal... Well appearing, lays to r side not moaning or in distress, well tanned smells of heavy tobacco, well nourished, awake, alert, oriented to person, place, time/situation and in no apparent distress.

## 2017-05-23 NOTE — ED PROVIDER NOTE - OBJECTIVE STATEMENT
Pt is a 62 yo male whose pmd is [facility], undergoing xrt by  in [place] for liver cancer has hx of opiate benzo abuse etoh abuse heavy smoker still smoking.  last xrt was thursday, by friday he began to have abd pain in ruq that is not relieved by his home pain meds.  heis sp gb and hernia smokes 2 ppd  was able to eat an egg mcmuffin and coffee today with out vomiting or worsening pain.

## 2017-05-23 NOTE — ED ADULT NURSE NOTE - CAS DISCH TRANSFER METHOD
family bringing patient home, pt is aware he cannot drive due to medication given in ED./Private car

## 2017-05-24 ENCOUNTER — CHART COPY (OUTPATIENT)
Age: 62
End: 2017-05-24

## 2017-05-24 DIAGNOSIS — C22.0 LIVER CELL CARCINOMA: ICD-10-CM

## 2017-06-16 ENCOUNTER — FORM ENCOUNTER (OUTPATIENT)
Age: 62
End: 2017-06-16

## 2017-06-17 ENCOUNTER — APPOINTMENT (OUTPATIENT)
Dept: MRI IMAGING | Facility: CLINIC | Age: 62
End: 2017-06-17

## 2017-06-17 ENCOUNTER — OUTPATIENT (OUTPATIENT)
Dept: OUTPATIENT SERVICES | Facility: HOSPITAL | Age: 62
LOS: 1 days | End: 2017-06-17
Payer: MEDICARE

## 2017-06-17 DIAGNOSIS — Z00.8 ENCOUNTER FOR OTHER GENERAL EXAMINATION: ICD-10-CM

## 2017-06-17 PROCEDURE — A9585: CPT

## 2017-06-17 PROCEDURE — 74183 MRI ABD W/O CNTR FLWD CNTR: CPT

## 2017-06-17 PROCEDURE — 82565 ASSAY OF CREATININE: CPT

## 2017-06-17 PROCEDURE — 74183 MRI ABD W/O CNTR FLWD CNTR: CPT | Mod: 26

## 2017-06-19 ENCOUNTER — CHART COPY (OUTPATIENT)
Age: 62
End: 2017-06-19

## 2017-06-21 ENCOUNTER — CHART COPY (OUTPATIENT)
Age: 62
End: 2017-06-21

## 2017-06-21 ENCOUNTER — APPOINTMENT (OUTPATIENT)
Dept: INTERVENTIONAL RADIOLOGY/VASCULAR | Facility: CLINIC | Age: 62
End: 2017-06-21

## 2017-06-21 VITALS
DIASTOLIC BLOOD PRESSURE: 97 MMHG | WEIGHT: 146 LBS | HEART RATE: 89 BPM | OXYGEN SATURATION: 97 % | RESPIRATION RATE: 18 BRPM | BODY MASS INDEX: 19.35 KG/M2 | SYSTOLIC BLOOD PRESSURE: 177 MMHG | HEIGHT: 73 IN

## 2017-06-21 DIAGNOSIS — R16.0 HEPATOMEGALY, NOT ELSEWHERE CLASSIFIED: ICD-10-CM

## 2017-06-21 RX ORDER — OXYCODONE HYDROCHLORIDE 40 MG/1
40 TABLET, FILM COATED, EXTENDED RELEASE ORAL
Refills: 0 | Status: COMPLETED | COMMUNITY
End: 2017-06-21

## 2017-06-21 RX ORDER — MORPHINE SULFATE 30 MG/1
TABLET ORAL
Refills: 0 | Status: ACTIVE | COMMUNITY

## 2017-06-23 ENCOUNTER — EMERGENCY (EMERGENCY)
Facility: HOSPITAL | Age: 62
LOS: 1 days | Discharge: HOME | End: 2017-06-23
Attending: EMERGENCY MEDICINE | Admitting: EMERGENCY MEDICINE
Payer: MEDICARE

## 2017-06-23 VITALS
SYSTOLIC BLOOD PRESSURE: 168 MMHG | OXYGEN SATURATION: 96 % | DIASTOLIC BLOOD PRESSURE: 82 MMHG | RESPIRATION RATE: 16 BRPM | TEMPERATURE: 98 F | HEART RATE: 80 BPM

## 2017-06-23 VITALS
RESPIRATION RATE: 16 BRPM | HEART RATE: 90 BPM | DIASTOLIC BLOOD PRESSURE: 89 MMHG | SYSTOLIC BLOOD PRESSURE: 144 MMHG | WEIGHT: 160.06 LBS | TEMPERATURE: 98 F | OXYGEN SATURATION: 99 %

## 2017-06-23 DIAGNOSIS — I10 ESSENTIAL (PRIMARY) HYPERTENSION: ICD-10-CM

## 2017-06-23 DIAGNOSIS — R10.32 LEFT LOWER QUADRANT PAIN: ICD-10-CM

## 2017-06-23 DIAGNOSIS — Z85.05 PERSONAL HISTORY OF MALIGNANT NEOPLASM OF LIVER: ICD-10-CM

## 2017-06-23 DIAGNOSIS — F17.210 NICOTINE DEPENDENCE, CIGARETTES, UNCOMPLICATED: ICD-10-CM

## 2017-06-23 DIAGNOSIS — Z90.49 ACQUIRED ABSENCE OF OTHER SPECIFIED PARTS OF DIGESTIVE TRACT: ICD-10-CM

## 2017-06-23 DIAGNOSIS — Z98.890 OTHER SPECIFIED POSTPROCEDURAL STATES: ICD-10-CM

## 2017-06-23 DIAGNOSIS — E11.9 TYPE 2 DIABETES MELLITUS WITHOUT COMPLICATIONS: ICD-10-CM

## 2017-06-23 LAB
ALBUMIN SERPL ELPH-MCNC: 2.9 G/DL — LOW (ref 3.3–5)
ALP SERPL-CCNC: 423 U/L — HIGH (ref 40–120)
ALT FLD-CCNC: 36 U/L — SIGNIFICANT CHANGE UP (ref 12–78)
AMYLASE P1 CFR SERPL: 66 U/L — SIGNIFICANT CHANGE UP (ref 25–115)
ANION GAP SERPL CALC-SCNC: 4 MMOL/L — LOW (ref 5–17)
APTT BLD: 38 SEC — HIGH (ref 27.5–37.4)
AST SERPL-CCNC: 54 U/L — HIGH (ref 15–37)
BASOPHILS # BLD AUTO: 0.1 K/UL — SIGNIFICANT CHANGE UP (ref 0–0.2)
BASOPHILS NFR BLD AUTO: 1.3 % — SIGNIFICANT CHANGE UP (ref 0–2)
BILIRUB SERPL-MCNC: 1.2 MG/DL — SIGNIFICANT CHANGE UP (ref 0.2–1.2)
BUN SERPL-MCNC: 16 MG/DL — SIGNIFICANT CHANGE UP (ref 7–23)
CALCIUM SERPL-MCNC: 9.3 MG/DL — SIGNIFICANT CHANGE UP (ref 8.5–10.1)
CHLORIDE SERPL-SCNC: 97 MMOL/L — SIGNIFICANT CHANGE UP (ref 96–108)
CO2 SERPL-SCNC: 32 MMOL/L — HIGH (ref 22–31)
CREAT SERPL-MCNC: 0.9 MG/DL — SIGNIFICANT CHANGE UP (ref 0.5–1.3)
EOSINOPHIL # BLD AUTO: 0.1 K/UL — SIGNIFICANT CHANGE UP (ref 0–0.5)
EOSINOPHIL NFR BLD AUTO: 0.7 % — SIGNIFICANT CHANGE UP (ref 0–6)
GLUCOSE SERPL-MCNC: 203 MG/DL — HIGH (ref 70–99)
HCT VFR BLD CALC: 44.2 % — SIGNIFICANT CHANGE UP (ref 39–50)
HGB BLD-MCNC: 14.5 G/DL — SIGNIFICANT CHANGE UP (ref 13–17)
INR BLD: 1.09 RATIO — SIGNIFICANT CHANGE UP (ref 0.88–1.16)
LIDOCAIN IGE QN: 172 U/L — SIGNIFICANT CHANGE UP (ref 73–393)
LYMPHOCYTES # BLD AUTO: 0.4 K/UL — LOW (ref 1–3.3)
LYMPHOCYTES # BLD AUTO: 4.7 % — LOW (ref 13–44)
MCHC RBC-ENTMCNC: 32.7 GM/DL — SIGNIFICANT CHANGE UP (ref 32–36)
MCHC RBC-ENTMCNC: 32.9 PG — SIGNIFICANT CHANGE UP (ref 27–34)
MCV RBC AUTO: 100.4 FL — HIGH (ref 80–100)
MONOCYTES # BLD AUTO: 0.7 K/UL — SIGNIFICANT CHANGE UP (ref 0–0.9)
MONOCYTES NFR BLD AUTO: 8.4 % — SIGNIFICANT CHANGE UP (ref 1–9)
NEUTROPHILS # BLD AUTO: 7.2 K/UL — SIGNIFICANT CHANGE UP (ref 1.8–7.4)
NEUTROPHILS NFR BLD AUTO: 84.8 % — HIGH (ref 43–77)
PLATELET # BLD AUTO: 155 K/UL — SIGNIFICANT CHANGE UP (ref 150–400)
POTASSIUM SERPL-MCNC: 4.4 MMOL/L — SIGNIFICANT CHANGE UP (ref 3.5–5.3)
POTASSIUM SERPL-SCNC: 4.4 MMOL/L — SIGNIFICANT CHANGE UP (ref 3.5–5.3)
PROT SERPL-MCNC: 10.7 G/DL — HIGH (ref 6–8.3)
PROTHROM AB SERPL-ACNC: 11.9 SEC — SIGNIFICANT CHANGE UP (ref 9.8–12.7)
RBC # BLD: 4.4 M/UL — SIGNIFICANT CHANGE UP (ref 4.2–5.8)
RBC # FLD: 14.1 % — SIGNIFICANT CHANGE UP (ref 10.3–14.5)
SODIUM SERPL-SCNC: 133 MMOL/L — LOW (ref 135–145)
WBC # BLD: 8.4 K/UL — SIGNIFICANT CHANGE UP (ref 3.8–10.5)
WBC # FLD AUTO: 8.4 K/UL — SIGNIFICANT CHANGE UP (ref 3.8–10.5)

## 2017-06-23 PROCEDURE — 36415 COLL VENOUS BLD VENIPUNCTURE: CPT

## 2017-06-23 PROCEDURE — 80053 COMPREHEN METABOLIC PANEL: CPT

## 2017-06-23 PROCEDURE — 82150 ASSAY OF AMYLASE: CPT

## 2017-06-23 PROCEDURE — 99285 EMERGENCY DEPT VISIT HI MDM: CPT | Mod: 25

## 2017-06-23 PROCEDURE — 96376 TX/PRO/DX INJ SAME DRUG ADON: CPT

## 2017-06-23 PROCEDURE — 85730 THROMBOPLASTIN TIME PARTIAL: CPT

## 2017-06-23 PROCEDURE — 96375 TX/PRO/DX INJ NEW DRUG ADDON: CPT

## 2017-06-23 PROCEDURE — 83690 ASSAY OF LIPASE: CPT

## 2017-06-23 PROCEDURE — 74176 CT ABD & PELVIS W/O CONTRAST: CPT | Mod: 26

## 2017-06-23 PROCEDURE — 85027 COMPLETE CBC AUTOMATED: CPT

## 2017-06-23 PROCEDURE — 85610 PROTHROMBIN TIME: CPT

## 2017-06-23 PROCEDURE — 74176 CT ABD & PELVIS W/O CONTRAST: CPT

## 2017-06-23 PROCEDURE — 96374 THER/PROPH/DIAG INJ IV PUSH: CPT

## 2017-06-23 PROCEDURE — 96361 HYDRATE IV INFUSION ADD-ON: CPT

## 2017-06-23 PROCEDURE — 99284 EMERGENCY DEPT VISIT MOD MDM: CPT | Mod: 25

## 2017-06-23 RX ORDER — SODIUM CHLORIDE 9 MG/ML
1000 INJECTION INTRAMUSCULAR; INTRAVENOUS; SUBCUTANEOUS ONCE
Qty: 0 | Refills: 0 | Status: COMPLETED | OUTPATIENT
Start: 2017-06-23 | End: 2017-06-23

## 2017-06-23 RX ORDER — HYDROMORPHONE HYDROCHLORIDE 2 MG/ML
1 INJECTION INTRAMUSCULAR; INTRAVENOUS; SUBCUTANEOUS ONCE
Qty: 0 | Refills: 0 | Status: DISCONTINUED | OUTPATIENT
Start: 2017-06-23 | End: 2017-06-23

## 2017-06-23 RX ORDER — ONDANSETRON 8 MG/1
4 TABLET, FILM COATED ORAL ONCE
Qty: 0 | Refills: 0 | Status: COMPLETED | OUTPATIENT
Start: 2017-06-23 | End: 2017-06-23

## 2017-06-23 RX ORDER — FAMOTIDINE 10 MG/ML
20 INJECTION INTRAVENOUS ONCE
Qty: 0 | Refills: 0 | Status: COMPLETED | OUTPATIENT
Start: 2017-06-23 | End: 2017-06-23

## 2017-06-23 RX ORDER — KETOROLAC TROMETHAMINE 30 MG/ML
30 SYRINGE (ML) INJECTION ONCE
Qty: 0 | Refills: 0 | Status: DISCONTINUED | OUTPATIENT
Start: 2017-06-23 | End: 2017-06-23

## 2017-06-23 RX ADMIN — Medication 30 MILLIGRAM(S): at 06:01

## 2017-06-23 RX ADMIN — Medication 30 MILLIGRAM(S): at 05:35

## 2017-06-23 RX ADMIN — HYDROMORPHONE HYDROCHLORIDE 1 MILLIGRAM(S): 2 INJECTION INTRAMUSCULAR; INTRAVENOUS; SUBCUTANEOUS at 06:21

## 2017-06-23 RX ADMIN — FAMOTIDINE 20 MILLIGRAM(S): 10 INJECTION INTRAVENOUS at 05:27

## 2017-06-23 RX ADMIN — HYDROMORPHONE HYDROCHLORIDE 1 MILLIGRAM(S): 2 INJECTION INTRAMUSCULAR; INTRAVENOUS; SUBCUTANEOUS at 05:21

## 2017-06-23 RX ADMIN — HYDROMORPHONE HYDROCHLORIDE 1 MILLIGRAM(S): 2 INJECTION INTRAMUSCULAR; INTRAVENOUS; SUBCUTANEOUS at 06:01

## 2017-06-23 RX ADMIN — ONDANSETRON 4 MILLIGRAM(S): 8 TABLET, FILM COATED ORAL at 05:21

## 2017-06-23 RX ADMIN — HYDROMORPHONE HYDROCHLORIDE 1 MILLIGRAM(S): 2 INJECTION INTRAMUSCULAR; INTRAVENOUS; SUBCUTANEOUS at 07:18

## 2017-06-23 RX ADMIN — SODIUM CHLORIDE 2000 MILLILITER(S): 9 INJECTION INTRAMUSCULAR; INTRAVENOUS; SUBCUTANEOUS at 06:12

## 2017-06-23 RX ADMIN — SODIUM CHLORIDE 2000 MILLILITER(S): 9 INJECTION INTRAMUSCULAR; INTRAVENOUS; SUBCUTANEOUS at 05:18

## 2017-06-23 NOTE — ED PROVIDER NOTE - OBJECTIVE STATEMENT
62 yo white male with DM and Hepatoma presently undergoing RTx who now presents with constant LLQ abdominal pain radiating to left flank x 1-day but intermittent x few days. Minimal dysuria. No hematuria. No fever or chills and no nausea/vomiting or diarrhea.

## 2017-06-23 NOTE — ED ADULT NURSE REASSESSMENT NOTE - NS ED NURSE REASSESS COMMENT FT1
Dr. Burton ordered for Dilaudid 1mg IV once stat for pain.
Patient transported to Cat scan per stretcher.
Patient states no change in pain @ 10/10 numeric. Dr. Burton made aware awaiting for further order.

## 2017-06-23 NOTE — ED ADULT NURSE NOTE - OBJECTIVE STATEMENT
Patient came in to ED c/o left lower quadrant abdominal pain radiating to the flank x 5 days got worse tonight. Patient also c/o burning on urination. Denies fever, no nausea or vomiting. Seen by dr. Burton with orders made and carried out. IV cannula inserted on the left AC g20 and blood drawn and sent to laboratory. Will continue to monitor.

## 2017-06-29 LAB
AFP-TM SERPL-MCNC: 322.2 NG/ML
ALBUMIN SERPL ELPH-MCNC: 3.1 G/DL
ALP BLD-CCNC: 355 U/L
ALT SERPL-CCNC: 33 U/L
ANION GAP SERPL CALC-SCNC: 12 MMOL/L
APTT BLD: 34.6 SEC
AST SERPL-CCNC: 49 U/L
BASOPHILS # BLD AUTO: 0.02 K/UL
BASOPHILS NFR BLD AUTO: 0.3 %
BILIRUB SERPL-MCNC: 1.4 MG/DL
BUN SERPL-MCNC: 15 MG/DL
CALCIUM SERPL-MCNC: 9.1 MG/DL
CHLORIDE SERPL-SCNC: 95 MMOL/L
CO2 SERPL-SCNC: 25 MMOL/L
CREAT SERPL-MCNC: 0.83 MG/DL
EOSINOPHIL # BLD AUTO: 0.06 K/UL
EOSINOPHIL NFR BLD AUTO: 1 %
GLUCOSE SERPL-MCNC: 202 MG/DL
HCT VFR BLD CALC: 38.4 %
HGB BLD-MCNC: 12.9 G/DL
IMM GRANULOCYTES NFR BLD AUTO: 0.2 %
INR PPP: 1.03 RATIO
LYMPHOCYTES # BLD AUTO: 0.57 K/UL
LYMPHOCYTES NFR BLD AUTO: 10 %
MAN DIFF?: NORMAL
MCHC RBC-ENTMCNC: 33.1 PG
MCHC RBC-ENTMCNC: 33.6 GM/DL
MCV RBC AUTO: 98.5 FL
MONOCYTES # BLD AUTO: 0.35 K/UL
MONOCYTES NFR BLD AUTO: 6.1 %
NEUTROPHILS # BLD AUTO: 4.71 K/UL
NEUTROPHILS NFR BLD AUTO: 82.4 %
PLATELET # BLD AUTO: 186 K/UL
POTASSIUM SERPL-SCNC: 4.4 MMOL/L
PROT SERPL-MCNC: 9.5 G/DL
PT BLD: 11.7 SEC
RBC # BLD: 3.9 M/UL
RBC # FLD: 15.3 %
SODIUM SERPL-SCNC: 132 MMOL/L
WBC # FLD AUTO: 5.72 K/UL

## 2017-09-03 ENCOUNTER — EMERGENCY (EMERGENCY)
Facility: HOSPITAL | Age: 62
LOS: 1 days | Discharge: HOME | End: 2017-09-03
Attending: INTERNAL MEDICINE | Admitting: INTERNAL MEDICINE
Payer: COMMERCIAL

## 2017-09-03 VITALS
OXYGEN SATURATION: 99 % | TEMPERATURE: 98 F | DIASTOLIC BLOOD PRESSURE: 61 MMHG | SYSTOLIC BLOOD PRESSURE: 118 MMHG | HEART RATE: 73 BPM | RESPIRATION RATE: 16 BRPM

## 2017-09-03 VITALS
RESPIRATION RATE: 15 BRPM | DIASTOLIC BLOOD PRESSURE: 74 MMHG | OXYGEN SATURATION: 94 % | HEART RATE: 116 BPM | SYSTOLIC BLOOD PRESSURE: 117 MMHG

## 2017-09-03 LAB
ALBUMIN SERPL ELPH-MCNC: 2.2 G/DL — LOW (ref 3.3–5)
ALP SERPL-CCNC: 338 U/L — HIGH (ref 40–120)
ALT FLD-CCNC: 37 U/L — SIGNIFICANT CHANGE UP (ref 12–78)
AMMONIA BLD-MCNC: 58 UMOL/L — HIGH (ref 11–32)
ANION GAP SERPL CALC-SCNC: 4 MMOL/L — LOW (ref 5–17)
AST SERPL-CCNC: 42 U/L — HIGH (ref 15–37)
BILIRUB SERPL-MCNC: 0.8 MG/DL — SIGNIFICANT CHANGE UP (ref 0.2–1.2)
BUN SERPL-MCNC: 20 MG/DL — SIGNIFICANT CHANGE UP (ref 7–23)
CALCIUM SERPL-MCNC: 8.1 MG/DL — LOW (ref 8.5–10.1)
CHLORIDE SERPL-SCNC: 97 MMOL/L — SIGNIFICANT CHANGE UP (ref 96–108)
CO2 SERPL-SCNC: 29 MMOL/L — SIGNIFICANT CHANGE UP (ref 22–31)
CREAT SERPL-MCNC: 0.93 MG/DL — SIGNIFICANT CHANGE UP (ref 0.5–1.3)
GLUCOSE SERPL-MCNC: 291 MG/DL — HIGH (ref 70–99)
HCT VFR BLD CALC: 36.8 % — LOW (ref 39–50)
HGB BLD-MCNC: 12.4 G/DL — LOW (ref 13–17)
LACTATE SERPL-SCNC: 0.7 MMOL/L — SIGNIFICANT CHANGE UP (ref 0.7–2)
MCHC RBC-ENTMCNC: 32.8 PG — SIGNIFICANT CHANGE UP (ref 27–34)
MCHC RBC-ENTMCNC: 33.8 GM/DL — SIGNIFICANT CHANGE UP (ref 32–36)
MCV RBC AUTO: 97.2 FL — SIGNIFICANT CHANGE UP (ref 80–100)
PLATELET # BLD AUTO: 140 K/UL — LOW (ref 150–400)
POTASSIUM SERPL-MCNC: 4.3 MMOL/L — SIGNIFICANT CHANGE UP (ref 3.5–5.3)
POTASSIUM SERPL-SCNC: 4.3 MMOL/L — SIGNIFICANT CHANGE UP (ref 3.5–5.3)
PROT SERPL-MCNC: 9.5 G/DL — HIGH (ref 6–8.3)
RBC # BLD: 3.79 M/UL — LOW (ref 4.2–5.8)
RBC # FLD: 14.1 % — SIGNIFICANT CHANGE UP (ref 10.3–14.5)
SODIUM SERPL-SCNC: 130 MMOL/L — LOW (ref 135–145)
WBC # BLD: 6.6 K/UL — SIGNIFICANT CHANGE UP (ref 3.8–10.5)
WBC # FLD AUTO: 6.6 K/UL — SIGNIFICANT CHANGE UP (ref 3.8–10.5)

## 2017-09-03 PROCEDURE — 96372 THER/PROPH/DIAG INJ SC/IM: CPT

## 2017-09-03 PROCEDURE — 99285 EMERGENCY DEPT VISIT HI MDM: CPT | Mod: 25

## 2017-09-03 PROCEDURE — 93005 ELECTROCARDIOGRAM TRACING: CPT

## 2017-09-03 PROCEDURE — 71010: CPT | Mod: 26

## 2017-09-03 PROCEDURE — 85027 COMPLETE CBC AUTOMATED: CPT

## 2017-09-03 PROCEDURE — 87040 BLOOD CULTURE FOR BACTERIA: CPT

## 2017-09-03 PROCEDURE — 83605 ASSAY OF LACTIC ACID: CPT

## 2017-09-03 PROCEDURE — 80053 COMPREHEN METABOLIC PANEL: CPT

## 2017-09-03 PROCEDURE — 70450 CT HEAD/BRAIN W/O DYE: CPT | Mod: 26

## 2017-09-03 PROCEDURE — 82140 ASSAY OF AMMONIA: CPT

## 2017-09-03 PROCEDURE — 70450 CT HEAD/BRAIN W/O DYE: CPT

## 2017-09-03 PROCEDURE — 71045 X-RAY EXAM CHEST 1 VIEW: CPT

## 2017-09-03 RX ORDER — OXYCODONE HYDROCHLORIDE 5 MG/1
1 TABLET ORAL
Qty: 0 | Refills: 0 | COMMUNITY

## 2017-09-03 RX ORDER — LACTULOSE 10 G/15ML
30 SOLUTION ORAL ONCE
Qty: 0 | Refills: 0 | Status: COMPLETED | OUTPATIENT
Start: 2017-09-03 | End: 2017-09-03

## 2017-09-03 RX ORDER — SODIUM CHLORIDE 9 MG/ML
1000 INJECTION INTRAMUSCULAR; INTRAVENOUS; SUBCUTANEOUS
Qty: 0 | Refills: 0 | Status: COMPLETED | OUTPATIENT
Start: 2017-09-03 | End: 2017-09-03

## 2017-09-03 RX ORDER — INSULIN LISPRO 100/ML
6 VIAL (ML) SUBCUTANEOUS ONCE
Qty: 0 | Refills: 0 | Status: COMPLETED | OUTPATIENT
Start: 2017-09-03 | End: 2017-09-03

## 2017-09-03 RX ADMIN — SODIUM CHLORIDE 1000 MILLILITER(S): 9 INJECTION INTRAMUSCULAR; INTRAVENOUS; SUBCUTANEOUS at 06:05

## 2017-09-03 RX ADMIN — Medication 6 UNIT(S): at 06:04

## 2017-09-03 RX ADMIN — LACTULOSE 30 GRAM(S): 10 SOLUTION ORAL at 06:20

## 2017-09-03 RX ADMIN — SODIUM CHLORIDE 1000 MILLILITER(S): 9 INJECTION INTRAMUSCULAR; INTRAVENOUS; SUBCUTANEOUS at 04:40

## 2017-09-03 NOTE — ED PROVIDER NOTE - CARE PLAN
Principal Discharge DX:	Altered mental state  Secondary Diagnosis:	Medication overdose  Secondary Diagnosis:	Hyperammonemia

## 2017-09-03 NOTE — ED PROVIDER NOTE - SIGNIFICANT NEGATIVE FINDINGS
no headache, no rash, no toxemia,  no chest pain, no SOB, no palpitations, no abdominal pain,  no n/v/d, no urinary symptoms, no bleeding. no focal  neuro changes.

## 2017-09-03 NOTE — ED ADULT NURSE REASSESSMENT NOTE - NS ED NURSE REASSESS COMMENT FT1
Patient is drowsy but arousable to tactile stimuli and verbal. Patient unable to urinate at this time. Patient refused catheter insertion to obtain urine for laboratory.

## 2017-09-03 NOTE — ED ADULT NURSE NOTE - OBJECTIVE STATEMENT
Patient came in to ED brought in by EMS for altered mental status. Patient is drowsy but arousable to verbal and tactile stimuli. Patient came in to ED brought in by EMS for altered mental status. Patient is drowsy but arousable to verbal and tactile stimuli. Patient is on hospice care for end stage liver cancer and tonight patient states he had abdominal pain and took 2 tablets of oxycodone 2 tabs. Seen by Dr. Pelayo with orders made and carried out.

## 2017-09-03 NOTE — ED PROVIDER NOTE - OBJECTIVE STATEMENT
62 y/o w/m h/o liver CA, pancreatitis, Hep-C , HTN ,AODM. The patient was found by his family to be lethargic. He reportedly took multiple doses of his analgesias and benzodiazepines to control pain. The patient is a hospice patient and  his brother reportedly is designated the health care proxy who did not come to the ED. No CP, No SOB, No N/V/D, No  symptoms, No focal neurologic changes. 60 y/o w/m h/o liver CA, pancreatitis, Hep-C , HTN ,AODM. The patient was found by his family to be lethargic. He reportedly took multiple doses of his analgesias and benzodiazepines to control pain. The patient is a hospice patient and  his brother reportedly is designated the health care proxy who did not come to the ED. No CP, No SOB, No N/V/D, No  symptoms, No focal neurologic changes. The patient is normally alert and active.

## 2017-09-03 NOTE — ED ADULT NURSE NOTE - HARM RISK FACTORS
ANTICOAGULATION FOLLOW-UP CLINIC VISIT    Patient Name:  Sophie Acharya  Date:  6/28/2017  Contact Type:  Face to Face    SUBJECTIVE:     Patient Findings     Positives No Problem Findings           OBJECTIVE    INR Protime   Date Value Ref Range Status   06/28/2017 2.2 (A) 0.86 - 1.14 Final       ASSESSMENT / PLAN  INR assessment THER    Recheck INR In: 2 WEEKS    INR Location Clinic      Anticoagulation Summary as of 6/28/2017     INR goal 1.5-2.0   Today's INR 2.2!   Maintenance plan 2.5 mg (2.5 mg x 1) on Tue, Thu, Fri; 5 mg (2.5 mg x 2) all other days   Full instructions 6/28: 2.5 mg; Otherwise 2.5 mg on Tue, Thu, Fri; 5 mg all other days   Weekly total 27.5 mg   Plan last modified Angélica Greene RN (5/11/2017)   Next INR check 7/12/2017   Priority INR   Target end date Indefinite    Indications   Long term current use of anticoagulant therapy [Z79.01]  Deep vein thrombosis (DVT) (HCC) [I82.409] (Resolved) [I82.409]         Anticoagulation Episode Summary     INR check location     Preferred lab     Send INR reminders to ED/IP/INR    Comments       Anticoagulation Care Providers     Provider Role Specialty Phone number    Vic Boudreaux MD Referring [facility] [phone]            See the Encounter Report to view Anticoagulation Flowsheet and Dosing Calendar (Go to Encounters tab in chart review, and find the Anticoagulation Therapy Visit)    INR 2.2.  Will decrease today's dose then resume previous maintenance dosing.  Recheck INR in 2 weeks.    Vincent Maldonado RN                yes

## 2017-09-03 NOTE — ED ADULT TRIAGE NOTE - CHIEF COMPLAINT QUOTE
patient sent from home for altered mental status, on hospice at home for end stage liver cancer. Patient drowsy, arousable to loud voice and painful stimuli

## 2017-09-07 DIAGNOSIS — R41.82 ALTERED MENTAL STATUS, UNSPECIFIED: ICD-10-CM

## 2017-09-07 DIAGNOSIS — F43.9 REACTION TO SEVERE STRESS, UNSPECIFIED: ICD-10-CM

## 2017-09-07 DIAGNOSIS — T42.4X1A POISONING BY BENZODIAZEPINES, ACCIDENTAL (UNINTENTIONAL), INITIAL ENCOUNTER: ICD-10-CM
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
## 2017-09-07 DIAGNOSIS — Z87.891 PERSONAL HISTORY OF NICOTINE DEPENDENCE: ICD-10-CM

## 2017-09-07 DIAGNOSIS — I10 ESSENTIAL (PRIMARY) HYPERTENSION: ICD-10-CM
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
## 2017-09-07 DIAGNOSIS — E72.20 DISORDER OF UREA CYCLE METABOLISM, UNSPECIFIED: ICD-10-CM

## 2017-09-07 DIAGNOSIS — E11.9 TYPE 2 DIABETES MELLITUS WITHOUT COMPLICATIONS: ICD-10-CM

## 2017-09-07 DIAGNOSIS — Z85.05 PERSONAL HISTORY OF MALIGNANT NEOPLASM OF LIVER: ICD-10-CM

## 2017-09-07 DIAGNOSIS — T39.91XA POISONING BY UNSPECIFIED NONOPIOID ANALGESIC, ANTIPYRETIC AND ANTIRHEUMATIC, ACCIDENTAL (UNINTENTIONAL), INITIAL ENCOUNTER: ICD-10-CM

## 2017-09-13 ENCOUNTER — INPATIENT (INPATIENT)
Facility: HOSPITAL | Age: 62
LOS: 14 days | Discharge: HOME | DRG: 917 | End: 2017-09-28
Attending: INTERNAL MEDICINE | Admitting: INTERNAL MEDICINE
Payer: MEDICARE

## 2017-09-13 VITALS — SYSTOLIC BLOOD PRESSURE: 162 MMHG | HEART RATE: 104 BPM | RESPIRATION RATE: 18 BRPM | DIASTOLIC BLOOD PRESSURE: 78 MMHG

## 2017-09-13 DIAGNOSIS — C22.9 MALIGNANT NEOPLASM OF LIVER, NOT SPECIFIED AS PRIMARY OR SECONDARY: ICD-10-CM

## 2017-09-13 DIAGNOSIS — E11.65 TYPE 2 DIABETES MELLITUS WITH HYPERGLYCEMIA: ICD-10-CM

## 2017-09-13 DIAGNOSIS — F05 DELIRIUM DUE TO KNOWN PHYSIOLOGICAL CONDITION: ICD-10-CM

## 2017-09-13 DIAGNOSIS — F10.10 ALCOHOL ABUSE, UNCOMPLICATED: ICD-10-CM

## 2017-09-13 DIAGNOSIS — R41.82 ALTERED MENTAL STATUS, UNSPECIFIED: ICD-10-CM

## 2017-09-13 DIAGNOSIS — I10 ESSENTIAL (PRIMARY) HYPERTENSION: ICD-10-CM

## 2017-09-13 DIAGNOSIS — F11.20 OPIOID DEPENDENCE, UNCOMPLICATED: ICD-10-CM

## 2017-09-13 DIAGNOSIS — F32.9 MAJOR DEPRESSIVE DISORDER, SINGLE EPISODE, UNSPECIFIED: ICD-10-CM

## 2017-09-13 LAB
ACETONE SERPL-MCNC: NEGATIVE — SIGNIFICANT CHANGE UP
ALBUMIN SERPL ELPH-MCNC: 3.1 G/DL — LOW (ref 3.3–5)
ALP SERPL-CCNC: 388 U/L — HIGH (ref 40–120)
ALT FLD-CCNC: 60 U/L RC — HIGH (ref 10–45)
AMMONIA BLD-MCNC: 22 UMOL/L — SIGNIFICANT CHANGE UP (ref 11–55)
ANION GAP SERPL CALC-SCNC: 10 MMOL/L — SIGNIFICANT CHANGE UP (ref 5–17)
ANION GAP SERPL CALC-SCNC: 9 MMOL/L — SIGNIFICANT CHANGE UP (ref 5–17)
APPEARANCE UR: CLEAR — SIGNIFICANT CHANGE UP
APTT BLD: 31.6 SEC — SIGNIFICANT CHANGE UP (ref 27.5–37.4)
AST SERPL-CCNC: 42 U/L — HIGH (ref 10–40)
BASE EXCESS BLDV CALC-SCNC: 4.2 MMOL/L — HIGH (ref -2–2)
BASOPHILS # BLD AUTO: 0 K/UL — SIGNIFICANT CHANGE UP (ref 0–0.2)
BASOPHILS NFR BLD AUTO: 0.2 % — SIGNIFICANT CHANGE UP (ref 0–2)
BILIRUB SERPL-MCNC: 1.2 MG/DL — SIGNIFICANT CHANGE UP (ref 0.2–1.2)
BILIRUB UR-MCNC: NEGATIVE — SIGNIFICANT CHANGE UP
BUN SERPL-MCNC: 10 MG/DL — SIGNIFICANT CHANGE UP (ref 7–23)
BUN SERPL-MCNC: 13 MG/DL — SIGNIFICANT CHANGE UP (ref 7–23)
CA-I SERPL-SCNC: 1.16 MMOL/L — SIGNIFICANT CHANGE UP (ref 1.12–1.3)
CALCIUM SERPL-MCNC: 7.3 MG/DL — LOW (ref 8.4–10.5)
CALCIUM SERPL-MCNC: 9.3 MG/DL — SIGNIFICANT CHANGE UP (ref 8.4–10.5)
CHLORIDE BLDV-SCNC: 97 MMOL/L — SIGNIFICANT CHANGE UP (ref 96–108)
CHLORIDE SERPL-SCNC: 105 MMOL/L — SIGNIFICANT CHANGE UP (ref 96–108)
CHLORIDE SERPL-SCNC: 92 MMOL/L — LOW (ref 96–108)
CO2 BLDV-SCNC: 30 MMOL/L — SIGNIFICANT CHANGE UP (ref 22–30)
CO2 SERPL-SCNC: 23 MMOL/L — SIGNIFICANT CHANGE UP (ref 22–31)
CO2 SERPL-SCNC: 26 MMOL/L — SIGNIFICANT CHANGE UP (ref 22–31)
COLOR SPEC: SIGNIFICANT CHANGE UP
CREAT SERPL-MCNC: 0.57 MG/DL — SIGNIFICANT CHANGE UP (ref 0.5–1.3)
CREAT SERPL-MCNC: 0.91 MG/DL — SIGNIFICANT CHANGE UP (ref 0.5–1.3)
DIFF PNL FLD: NEGATIVE — SIGNIFICANT CHANGE UP
EOSINOPHIL # BLD AUTO: 0 K/UL — SIGNIFICANT CHANGE UP (ref 0–0.5)
EOSINOPHIL NFR BLD AUTO: 0.1 % — SIGNIFICANT CHANGE UP (ref 0–6)
GAS PNL BLDV: 127 MMOL/L — LOW (ref 136–145)
GAS PNL BLDV: SIGNIFICANT CHANGE UP
GAS PNL BLDV: SIGNIFICANT CHANGE UP
GLUCOSE BLDV-MCNC: 539 MG/DL — CRITICAL HIGH (ref 70–99)
GLUCOSE SERPL-MCNC: 239 MG/DL — HIGH (ref 70–99)
GLUCOSE SERPL-MCNC: 580 MG/DL — CRITICAL HIGH (ref 70–99)
GLUCOSE UR QL: >1000
HCO3 BLDV-SCNC: 28 MMOL/L — SIGNIFICANT CHANGE UP (ref 21–29)
HCT VFR BLD CALC: 37.3 % — LOW (ref 39–50)
HCT VFR BLDA CALC: 39 % — SIGNIFICANT CHANGE UP (ref 39–50)
HGB BLD CALC-MCNC: 12.8 G/DL — LOW (ref 13–17)
HGB BLD-MCNC: 12.8 G/DL — LOW (ref 13–17)
INR BLD: 1.24 RATIO — HIGH (ref 0.88–1.16)
KETONES UR-MCNC: NEGATIVE — SIGNIFICANT CHANGE UP
LACTATE BLDV-MCNC: 3.8 MMOL/L — HIGH (ref 0.7–2)
LDH SERPL L TO P-CCNC: 256 U/L — HIGH (ref 50–242)
LEUKOCYTE ESTERASE UR-ACNC: NEGATIVE — SIGNIFICANT CHANGE UP
LYMPHOCYTES # BLD AUTO: 0.4 K/UL — LOW (ref 1–3.3)
LYMPHOCYTES # BLD AUTO: 6.9 % — LOW (ref 13–44)
MCHC RBC-ENTMCNC: 34.4 GM/DL — SIGNIFICANT CHANGE UP (ref 32–36)
MCHC RBC-ENTMCNC: 34.8 PG — HIGH (ref 27–34)
MCV RBC AUTO: 101 FL — HIGH (ref 80–100)
MONOCYTES # BLD AUTO: 0.6 K/UL — SIGNIFICANT CHANGE UP (ref 0–0.9)
MONOCYTES NFR BLD AUTO: 9.5 % — SIGNIFICANT CHANGE UP (ref 2–14)
NEUTROPHILS # BLD AUTO: 5 K/UL — SIGNIFICANT CHANGE UP (ref 1.8–7.4)
NEUTROPHILS NFR BLD AUTO: 83.3 % — HIGH (ref 43–77)
NITRITE UR-MCNC: NEGATIVE — SIGNIFICANT CHANGE UP
PCO2 BLDV: 44 MMHG — SIGNIFICANT CHANGE UP (ref 35–50)
PH BLDV: 7.43 — SIGNIFICANT CHANGE UP (ref 7.35–7.45)
PH UR: 6.5 — SIGNIFICANT CHANGE UP (ref 5–8)
PLATELET # BLD AUTO: 118 K/UL — LOW (ref 150–400)
PO2 BLDV: 32 MMHG — SIGNIFICANT CHANGE UP (ref 25–45)
POTASSIUM BLDV-SCNC: 4.8 MMOL/L — SIGNIFICANT CHANGE UP (ref 3.5–5)
POTASSIUM SERPL-MCNC: 3.2 MMOL/L — LOW (ref 3.5–5.3)
POTASSIUM SERPL-MCNC: 4.6 MMOL/L — SIGNIFICANT CHANGE UP (ref 3.5–5.3)
POTASSIUM SERPL-SCNC: 3.2 MMOL/L — LOW (ref 3.5–5.3)
POTASSIUM SERPL-SCNC: 4.6 MMOL/L — SIGNIFICANT CHANGE UP (ref 3.5–5.3)
PROT SERPL-MCNC: 10.4 G/DL — HIGH (ref 6–8.3)
PROT UR-MCNC: NEGATIVE — SIGNIFICANT CHANGE UP
PROTHROM AB SERPL-ACNC: 13.6 SEC — HIGH (ref 9.8–12.7)
RBC # BLD: 3.68 M/UL — LOW (ref 4.2–5.8)
RBC # FLD: 14.1 % — SIGNIFICANT CHANGE UP (ref 10.3–14.5)
RBC CASTS # UR COMP ASSIST: SIGNIFICANT CHANGE UP /HPF (ref 0–2)
SAO2 % BLDV: 59 % — LOW (ref 67–88)
SODIUM SERPL-SCNC: 128 MMOL/L — LOW (ref 135–145)
SODIUM SERPL-SCNC: 137 MMOL/L — SIGNIFICANT CHANGE UP (ref 135–145)
SP GR SPEC: 1.03 — HIGH (ref 1.01–1.02)
UROBILINOGEN FLD QL: NEGATIVE — SIGNIFICANT CHANGE UP
WBC # BLD: 6 K/UL — SIGNIFICANT CHANGE UP (ref 3.8–10.5)
WBC # FLD AUTO: 6 K/UL — SIGNIFICANT CHANGE UP (ref 3.8–10.5)
WBC UR QL: SIGNIFICANT CHANGE UP /HPF (ref 0–5)

## 2017-09-13 PROCEDURE — 99285 EMERGENCY DEPT VISIT HI MDM: CPT

## 2017-09-13 PROCEDURE — 70450 CT HEAD/BRAIN W/O DYE: CPT | Mod: 26,GV

## 2017-09-13 PROCEDURE — 99222 1ST HOSP IP/OBS MODERATE 55: CPT | Mod: GV

## 2017-09-13 RX ORDER — PROCHLORPERAZINE MALEATE 5 MG
0 TABLET ORAL
Qty: 0 | Refills: 0 | COMMUNITY

## 2017-09-13 RX ORDER — DEXTROSE 50 % IN WATER 50 %
25 SYRINGE (ML) INTRAVENOUS ONCE
Qty: 0 | Refills: 0 | Status: DISCONTINUED | OUTPATIENT
Start: 2017-09-13 | End: 2017-09-28

## 2017-09-13 RX ORDER — SODIUM CHLORIDE 9 MG/ML
1000 INJECTION INTRAMUSCULAR; INTRAVENOUS; SUBCUTANEOUS
Qty: 0 | Refills: 0 | Status: DISCONTINUED | OUTPATIENT
Start: 2017-09-13 | End: 2017-09-15

## 2017-09-13 RX ORDER — DEXTROSE 50 % IN WATER 50 %
1 SYRINGE (ML) INTRAVENOUS ONCE
Qty: 0 | Refills: 0 | Status: DISCONTINUED | OUTPATIENT
Start: 2017-09-13 | End: 2017-09-28

## 2017-09-13 RX ORDER — INSULIN LISPRO 100/ML
VIAL (ML) SUBCUTANEOUS AT BEDTIME
Qty: 0 | Refills: 0 | Status: DISCONTINUED | OUTPATIENT
Start: 2017-09-13 | End: 2017-09-27

## 2017-09-13 RX ORDER — DEXTROSE 50 % IN WATER 50 %
12.5 SYRINGE (ML) INTRAVENOUS ONCE
Qty: 0 | Refills: 0 | Status: DISCONTINUED | OUTPATIENT
Start: 2017-09-13 | End: 2017-09-28

## 2017-09-13 RX ORDER — OXYCODONE HYDROCHLORIDE 5 MG/1
15 TABLET ORAL
Qty: 0 | Refills: 0 | Status: DISCONTINUED | OUTPATIENT
Start: 2017-09-13 | End: 2017-09-14

## 2017-09-13 RX ORDER — ONDANSETRON 8 MG/1
4 TABLET, FILM COATED ORAL
Qty: 0 | Refills: 0 | Status: DISCONTINUED | OUTPATIENT
Start: 2017-09-13 | End: 2017-09-28

## 2017-09-13 RX ORDER — INSULIN HUMAN 100 [IU]/ML
10 INJECTION, SOLUTION SUBCUTANEOUS ONCE
Qty: 0 | Refills: 0 | Status: COMPLETED | OUTPATIENT
Start: 2017-09-13 | End: 2017-09-13

## 2017-09-13 RX ORDER — MORPHINE SULFATE 50 MG/1
2 CAPSULE, EXTENDED RELEASE ORAL ONCE
Qty: 0 | Refills: 0 | Status: DISCONTINUED | OUTPATIENT
Start: 2017-09-13 | End: 2017-09-13

## 2017-09-13 RX ORDER — MORPHINE SULFATE 50 MG/1
4 CAPSULE, EXTENDED RELEASE ORAL ONCE
Qty: 0 | Refills: 0 | Status: DISCONTINUED | OUTPATIENT
Start: 2017-09-13 | End: 2017-09-13

## 2017-09-13 RX ORDER — KETOROLAC TROMETHAMINE 30 MG/ML
30 SYRINGE (ML) INJECTION ONCE
Qty: 0 | Refills: 0 | Status: DISCONTINUED | OUTPATIENT
Start: 2017-09-13 | End: 2017-09-13

## 2017-09-13 RX ORDER — SODIUM CHLORIDE 9 MG/ML
1000 INJECTION, SOLUTION INTRAVENOUS
Qty: 0 | Refills: 0 | Status: DISCONTINUED | OUTPATIENT
Start: 2017-09-13 | End: 2017-09-28

## 2017-09-13 RX ORDER — GLUCAGON INJECTION, SOLUTION 0.5 MG/.1ML
1 INJECTION, SOLUTION SUBCUTANEOUS ONCE
Qty: 0 | Refills: 0 | Status: DISCONTINUED | OUTPATIENT
Start: 2017-09-13 | End: 2017-09-28

## 2017-09-13 RX ORDER — SODIUM CHLORIDE 9 MG/ML
1000 INJECTION INTRAMUSCULAR; INTRAVENOUS; SUBCUTANEOUS ONCE
Qty: 0 | Refills: 0 | Status: COMPLETED | OUTPATIENT
Start: 2017-09-13 | End: 2017-09-13

## 2017-09-13 RX ORDER — INSULIN LISPRO 100/ML
VIAL (ML) SUBCUTANEOUS
Qty: 0 | Refills: 0 | Status: DISCONTINUED | OUTPATIENT
Start: 2017-09-13 | End: 2017-09-19

## 2017-09-13 RX ADMIN — MORPHINE SULFATE 4 MILLIGRAM(S): 50 CAPSULE, EXTENDED RELEASE ORAL at 20:07

## 2017-09-13 RX ADMIN — SODIUM CHLORIDE 75 MILLILITER(S): 9 INJECTION INTRAMUSCULAR; INTRAVENOUS; SUBCUTANEOUS at 21:36

## 2017-09-13 RX ADMIN — SODIUM CHLORIDE 1000 MILLILITER(S): 9 INJECTION INTRAMUSCULAR; INTRAVENOUS; SUBCUTANEOUS at 14:38

## 2017-09-13 RX ADMIN — INSULIN HUMAN 10 UNIT(S): 100 INJECTION, SOLUTION SUBCUTANEOUS at 14:07

## 2017-09-13 RX ADMIN — SODIUM CHLORIDE 75 MILLILITER(S): 9 INJECTION INTRAMUSCULAR; INTRAVENOUS; SUBCUTANEOUS at 18:52

## 2017-09-13 RX ADMIN — SODIUM CHLORIDE 1000 MILLILITER(S): 9 INJECTION INTRAMUSCULAR; INTRAVENOUS; SUBCUTANEOUS at 14:07

## 2017-09-13 RX ADMIN — Medication 30 MILLIGRAM(S): at 15:19

## 2017-09-13 RX ADMIN — SODIUM CHLORIDE 75 MILLILITER(S): 9 INJECTION INTRAMUSCULAR; INTRAVENOUS; SUBCUTANEOUS at 20:13

## 2017-09-13 RX ADMIN — MORPHINE SULFATE 4 MILLIGRAM(S): 50 CAPSULE, EXTENDED RELEASE ORAL at 17:00

## 2017-09-13 RX ADMIN — MORPHINE SULFATE 2 MILLIGRAM(S): 50 CAPSULE, EXTENDED RELEASE ORAL at 21:24

## 2017-09-13 NOTE — ED PROVIDER NOTE - ATTENDING CONTRIBUTION TO CARE
Patient with questionable misappropriation of analgesia and benzodiazepines at hospice. ? delirious behavior today as well and smudging feces on the walls of the bathroom when trying to stand from a reported fall. Discharged from hospice for cause after a bottle of morphine and clonazepam were found in his room empty and concern for abuse arose.   icteric, cachetic, trachea midline, ctab, cooperative, alert, non-tachycardic, non-tachypneic, mild distress secondary to pain  See MDM above.   Will follow up on labs, analgesia, reassess and disposition to the inpatient team as clinically indicated.

## 2017-09-13 NOTE — ED ADULT TRIAGE NOTE - CHIEF COMPLAINT QUOTE
Sent from hospice facility for psychiatric evaluation Sent from hospice facility for evaluation for aggressive behavior

## 2017-09-13 NOTE — ED PROVIDER NOTE - OBJECTIVE STATEMENT
60 y/o w/m h/o liver CA, pancreatitis, Hep-C , HTN ,IDDM. was at Hospice and found wandering in another patients room and smearing feces according to EMS. He complains of abdominal pain which he admits has been there for at least one month. He was on PCA pain pump at hospice and was found to be taking PO morphine in addition, which was not known to the staff at hospice. They suspect that he has overdosed on opiates causing delirium. He reports he was in another patients room because he was thirsty. He denies smearing feces.

## 2017-09-13 NOTE — ED ADULT NURSE NOTE - OBJECTIVE STATEMENT
pt BIBA for episode of confusion/agitation in hospice, pt with H/O liver carcinoma on Morphine, non-prescribed Morphine bottle found on patient in Hospice facility, pt also observed placing feces on walls of hospice facility, pt A;Ox3 at present, no acute resp distress noted, v/s stable, mild abd pain as per pt. abd soft, mildly distended, voids freely, ambulates with unsteady gait, pt placed on one to one for safety, MD rivera, labs sent, will continue to monitor

## 2017-09-13 NOTE — ED ADULT NURSE REASSESSMENT NOTE - NS ED NURSE REASSESS COMMENT FT1
Pt refused blood work, WILIAM Singleton made aware.
Report received from WILIAM Levine. Pt on 1:1 observation resting comfortably in stretcher in no distress. Pt awaiting bed assignment. Safety and comfort maintained.

## 2017-09-13 NOTE — ED BEHAVIORAL HEALTH ASSESSMENT NOTE - OTHER PAST PSYCHIATRIC HISTORY (INCLUDE DETAILS REGARDING ONSET, COURSE OF ILLNESS, INPATIENT/OUTPATIENT TREATMENT)
pt has been admitted to [facility] in the past for drug abuse, depression, no recent admissions, pt in tx with psychiatrist Dr. Luke Ndiaye for few yrs, pt was klonopin , stopped recently, pt states he tried several antidepressants in past

## 2017-09-13 NOTE — ED BEHAVIORAL HEALTH ASSESSMENT NOTE - PSYCHIATRIC ISSUES AND PLAN (INCLUDE STANDING AND PRN MEDICATION)
hold off tempazepam for now given ams, cont med workup, gi consult check ammonia level, ct head, brain mri, eeg

## 2017-09-13 NOTE — H&P ADULT - HISTORY OF PRESENT ILLNESS
62 y/o w/m h/o liver CA, pancreatitis, Hep-C , HTN ,IDDM. was at Hospice and found wandering in another patients room and smearing feces according to EMS. He complains of abdominal pain which he admits has been there for at least one month. He was on PCA pain pump at hospice and was found to be taking PO morphine in addition, which was not known to the staff at hospice. They suspect that he has overdosed on opiates causing delirium. He reports he was in another patients room because he was thirsty. He denies smearing feces.

## 2017-09-13 NOTE — ED PROVIDER NOTE - MEDICAL DECISION MAKING DETAILS
Patient sent to hospital for concern for si/hi and or delirium  will get iv, labs, analgesia prn, CIWA, and admit

## 2017-09-13 NOTE — ED BEHAVIORAL HEALTH ASSESSMENT NOTE - DESCRIPTION (FIRST USE, LAST USE, QUANTITY, FREQUENCY, DURATION)
recently overusing mscontin, temazepam? see above in the past was drinking heavily, last drink 3 yrs ago

## 2017-09-13 NOTE — ED BEHAVIORAL HEALTH ASSESSMENT NOTE - SUICIDE PROTECTIVE FACTORS
Supportive social network or family/Fear of death or dying due to pain/suffering/Identifies reasons for living

## 2017-09-13 NOTE — ED BEHAVIORAL HEALTH ASSESSMENT NOTE - RISK ASSESSMENT
pt overall moderate risk for suicide attempt, pt has prior hx attempt, endorses passive SI, but no intent/plan, pt has chronic pain, liver cancer,  end of life, depression, protective factors include family support, future oriented.

## 2017-09-13 NOTE — ED BEHAVIORAL HEALTH ASSESSMENT NOTE - SUMMARY
Pt is a 62 y/o male, hx liver cancer, mult med issues, hx alcohol abuse, opiate and benzo abuse, hx psych admissions, in tx with Dr. Ndiaye psychiatrist, bib ems from hospice [facility] today for ams, agitation, pt oriented to place, person not time, unable to give coherent story of recent events, seems confused, paranoid, guarded, currently calm, no prns given, pt was hyperglycemic, hyponatermic, will most likely need med admission, pt denies active si/hi, c/o some depressive sx in context of abd pain, passive SI, pt not on psych meds currently.

## 2017-09-13 NOTE — ED BEHAVIORAL HEALTH ASSESSMENT NOTE - HPI (INCLUDE ILLNESS QUALITY, SEVERITY, DURATION, TIMING, CONTEXT, MODIFYING FACTORS, ASSOCIATED SIGNS AND SYMPTOMS)
Pt is a 60 y/o male, hx liver cancer, mult med issues, hx alcohol abuse, opiate and benzo abuse, hx psych admissions, in tx with Dr. Ndiaye psychiatrist, bib ems from hospice [facility] today for ams, agitation. Pt states he has been in a lot of abdominal pain, demanding more pain meds. pt was started on morphine pca pump yesterday. Pt states he was not confused or agitated today. pt went to the bathroom today and stepped on his feces accidentally, denied intentionally smearing his feces on the unit. Pt reported feeling depressed in context of his medical issues, chronic pain, denies anhedonia, c/o low energy, fair concentration. Pt reported passive SI, feels better off dead, no active SI/i/p. Pt denies hi. Pt denies psychosis, drake. As per staff, pt was confused acutely today, was smearing feces all over the unit, pt has been more anxious, pain med seeking the last few days, pt was started on pca pump yesterday. As per Dr. Montoya, pt will not be accepted back to the hospice unit at this time due to concerns for ams, agitation     collateral obtained from psychiatrist , he only saw pt twice, prior psychiatrist retired. He states pt has been more depressed, afraid to die, more anxiety, has been on mulitple meds klonopin, temazepam, luvox, wellbutrin, trazodone. Pt recently changed klonopin to temazepam for  anxiety, insomnia. Pt reported passive SI, no active SI, intent/plan, no psychosis, confusion, drake.

## 2017-09-13 NOTE — ED ADULT NURSE NOTE - CHPI ED SYMPTOMS NEG
no weakness/no loss of consciousness/no blurred vision/no numbness/no dizziness/no vomiting/no fever/no nausea/no change in level of consciousness

## 2017-09-14 DIAGNOSIS — G89.3 NEOPLASM RELATED PAIN (ACUTE) (CHRONIC): ICD-10-CM

## 2017-09-14 DIAGNOSIS — R41.0 DISORIENTATION, UNSPECIFIED: ICD-10-CM

## 2017-09-14 DIAGNOSIS — Z51.5 ENCOUNTER FOR PALLIATIVE CARE: ICD-10-CM

## 2017-09-14 DIAGNOSIS — F41.9 ANXIETY DISORDER, UNSPECIFIED: ICD-10-CM

## 2017-09-14 LAB
CULTURE RESULTS: NO GROWTH — SIGNIFICANT CHANGE UP
HBA1C BLD-MCNC: 9.4 % — HIGH (ref 4–5.6)
SPECIMEN SOURCE: SIGNIFICANT CHANGE UP

## 2017-09-14 PROCEDURE — 99232 SBSQ HOSP IP/OBS MODERATE 35: CPT | Mod: GV

## 2017-09-14 PROCEDURE — 99223 1ST HOSP IP/OBS HIGH 75: CPT

## 2017-09-14 RX ORDER — METHADONE HYDROCHLORIDE 40 MG/1
5 TABLET ORAL
Qty: 0 | Refills: 0 | Status: DISCONTINUED | OUTPATIENT
Start: 2017-09-14 | End: 2017-09-18

## 2017-09-14 RX ORDER — MORPHINE SULFATE 50 MG/1
4 CAPSULE, EXTENDED RELEASE ORAL EVERY 4 HOURS
Qty: 0 | Refills: 0 | Status: DISCONTINUED | OUTPATIENT
Start: 2017-09-14 | End: 2017-09-14

## 2017-09-14 RX ORDER — KETOROLAC TROMETHAMINE 30 MG/ML
15 SYRINGE (ML) INJECTION ONCE
Qty: 0 | Refills: 0 | Status: DISCONTINUED | OUTPATIENT
Start: 2017-09-14 | End: 2017-09-14

## 2017-09-14 RX ORDER — POTASSIUM CHLORIDE 20 MEQ
40 PACKET (EA) ORAL ONCE
Qty: 0 | Refills: 0 | Status: COMPLETED | OUTPATIENT
Start: 2017-09-14 | End: 2017-09-14

## 2017-09-14 RX ORDER — MORPHINE SULFATE 50 MG/1
2 CAPSULE, EXTENDED RELEASE ORAL EVERY 4 HOURS
Qty: 0 | Refills: 0 | Status: DISCONTINUED | OUTPATIENT
Start: 2017-09-14 | End: 2017-09-14

## 2017-09-14 RX ORDER — MORPHINE SULFATE 50 MG/1
2 CAPSULE, EXTENDED RELEASE ORAL ONCE
Qty: 0 | Refills: 0 | Status: DISCONTINUED | OUTPATIENT
Start: 2017-09-14 | End: 2017-09-14

## 2017-09-14 RX ORDER — MORPHINE SULFATE 50 MG/1
1 CAPSULE, EXTENDED RELEASE ORAL ONCE
Qty: 0 | Refills: 0 | Status: DISCONTINUED | OUTPATIENT
Start: 2017-09-14 | End: 2017-09-14

## 2017-09-14 RX ORDER — NALOXONE HYDROCHLORIDE 4 MG/.1ML
0.1 SPRAY NASAL
Qty: 0 | Refills: 0 | Status: DISCONTINUED | OUTPATIENT
Start: 2017-09-14 | End: 2017-09-28

## 2017-09-14 RX ORDER — HYDROMORPHONE HYDROCHLORIDE 2 MG/ML
0.5 INJECTION INTRAMUSCULAR; INTRAVENOUS; SUBCUTANEOUS
Qty: 0 | Refills: 0 | Status: DISCONTINUED | OUTPATIENT
Start: 2017-09-14 | End: 2017-09-15

## 2017-09-14 RX ORDER — MORPHINE SULFATE 50 MG/1
4 CAPSULE, EXTENDED RELEASE ORAL EVERY 6 HOURS
Qty: 0 | Refills: 0 | Status: DISCONTINUED | OUTPATIENT
Start: 2017-09-14 | End: 2017-09-14

## 2017-09-14 RX ORDER — DEXAMETHASONE 0.5 MG/5ML
2 ELIXIR ORAL DAILY
Qty: 0 | Refills: 0 | Status: DISCONTINUED | OUTPATIENT
Start: 2017-09-14 | End: 2017-09-15

## 2017-09-14 RX ORDER — DEXAMETHASONE 0.5 MG/5ML
2 ELIXIR ORAL
Qty: 0 | Refills: 0 | Status: DISCONTINUED | OUTPATIENT
Start: 2017-09-14 | End: 2017-09-14

## 2017-09-14 RX ADMIN — MORPHINE SULFATE 4 MILLIGRAM(S): 50 CAPSULE, EXTENDED RELEASE ORAL at 11:33

## 2017-09-14 RX ADMIN — MORPHINE SULFATE 2 MILLIGRAM(S): 50 CAPSULE, EXTENDED RELEASE ORAL at 03:30

## 2017-09-14 RX ADMIN — MORPHINE SULFATE 4 MILLIGRAM(S): 50 CAPSULE, EXTENDED RELEASE ORAL at 14:29

## 2017-09-14 RX ADMIN — Medication 15 MILLIGRAM(S): at 01:56

## 2017-09-14 RX ADMIN — MORPHINE SULFATE 2 MILLIGRAM(S): 50 CAPSULE, EXTENDED RELEASE ORAL at 03:04

## 2017-09-14 RX ADMIN — Medication 40 MILLIEQUIVALENT(S): at 20:54

## 2017-09-14 RX ADMIN — Medication 1 MILLIGRAM(S): at 23:59

## 2017-09-14 RX ADMIN — OXYCODONE HYDROCHLORIDE 15 MILLIGRAM(S): 5 TABLET ORAL at 16:33

## 2017-09-14 RX ADMIN — Medication 3: at 09:00

## 2017-09-14 RX ADMIN — MORPHINE SULFATE 4 MILLIGRAM(S): 50 CAPSULE, EXTENDED RELEASE ORAL at 14:59

## 2017-09-14 RX ADMIN — MORPHINE SULFATE 1 MILLIGRAM(S): 50 CAPSULE, EXTENDED RELEASE ORAL at 04:35

## 2017-09-14 RX ADMIN — Medication 1 MILLIGRAM(S): at 14:29

## 2017-09-14 RX ADMIN — HYDROMORPHONE HYDROCHLORIDE 0.5 MILLIGRAM(S): 2 INJECTION INTRAMUSCULAR; INTRAVENOUS; SUBCUTANEOUS at 21:10

## 2017-09-14 RX ADMIN — OXYCODONE HYDROCHLORIDE 15 MILLIGRAM(S): 5 TABLET ORAL at 16:14

## 2017-09-14 RX ADMIN — OXYCODONE HYDROCHLORIDE 15 MILLIGRAM(S): 5 TABLET ORAL at 06:36

## 2017-09-14 RX ADMIN — Medication 2: at 13:00

## 2017-09-14 RX ADMIN — OXYCODONE HYDROCHLORIDE 15 MILLIGRAM(S): 5 TABLET ORAL at 07:06

## 2017-09-14 RX ADMIN — HYDROMORPHONE HYDROCHLORIDE 0.5 MILLIGRAM(S): 2 INJECTION INTRAMUSCULAR; INTRAVENOUS; SUBCUTANEOUS at 18:15

## 2017-09-14 RX ADMIN — Medication 40 MILLIEQUIVALENT(S): at 03:13

## 2017-09-14 RX ADMIN — SODIUM CHLORIDE 75 MILLILITER(S): 9 INJECTION INTRAMUSCULAR; INTRAVENOUS; SUBCUTANEOUS at 23:59

## 2017-09-14 RX ADMIN — MORPHINE SULFATE 4 MILLIGRAM(S): 50 CAPSULE, EXTENDED RELEASE ORAL at 12:03

## 2017-09-14 RX ADMIN — Medication 15 MILLIGRAM(S): at 00:50

## 2017-09-14 RX ADMIN — METHADONE HYDROCHLORIDE 5 MILLIGRAM(S): 40 TABLET ORAL at 22:37

## 2017-09-14 RX ADMIN — MORPHINE SULFATE 2 MILLIGRAM(S): 50 CAPSULE, EXTENDED RELEASE ORAL at 09:19

## 2017-09-14 RX ADMIN — MORPHINE SULFATE 1 MILLIGRAM(S): 50 CAPSULE, EXTENDED RELEASE ORAL at 05:00

## 2017-09-14 RX ADMIN — HYDROMORPHONE HYDROCHLORIDE 0.5 MILLIGRAM(S): 2 INJECTION INTRAMUSCULAR; INTRAVENOUS; SUBCUTANEOUS at 20:55

## 2017-09-14 RX ADMIN — HYDROMORPHONE HYDROCHLORIDE 0.5 MILLIGRAM(S): 2 INJECTION INTRAMUSCULAR; INTRAVENOUS; SUBCUTANEOUS at 17:44

## 2017-09-14 RX ADMIN — SODIUM CHLORIDE 75 MILLILITER(S): 9 INJECTION INTRAMUSCULAR; INTRAVENOUS; SUBCUTANEOUS at 10:37

## 2017-09-14 RX ADMIN — MORPHINE SULFATE 2 MILLIGRAM(S): 50 CAPSULE, EXTENDED RELEASE ORAL at 09:49

## 2017-09-14 RX ADMIN — Medication 2: at 17:44

## 2017-09-14 NOTE — CONSULT NOTE ADULT - ASSESSMENT
60 y/o w/m h/o liver CA, pancreatitis, Hep-C , HTN ,IDDM admitted with Delirium in the setting of prescription medications abuse. Consult called for symptomatic Rx (pain/Anxiety).

## 2017-09-14 NOTE — CONSULT NOTE ADULT - PROBLEM SELECTOR RECOMMENDATION 4
Patient indicated his desire to return home with hospice and will further discuss that with him tomorrow. Will also attempt to discuss code status when symptoms become better controlled.

## 2017-09-14 NOTE — PROGRESS NOTE ADULT - SUBJECTIVE AND OBJECTIVE BOX
Patient is a 61y old  Male who presents with a chief complaint of Lethargy (13 Sep 2017 19:09)      SUBJECTIVE / OVERNIGHT EVENTS:  Awake and alert, complains of abdominal pain. Denies any headaches.  Review of Systems:   CONSTITUTIONAL: No fever, weight loss, or fatigue  EYES: No eye pain, visual disturbances, or discharge  ENMT:  No difficulty hearing, tinnitus, vertigo; No sinus or throat pain  NECK: No pain or stiffness  BREASTS: No pain, masses, or nipple discharge  RESPIRATORY: No cough, wheezing, chills or hemoptysis; No shortness of breath  CARDIOVASCULAR: No chest pain, palpitations, dizziness, or leg swelling  GASTROINTESTINAL:  No nausea, vomiting, or hematemesis; No diarrhea or constipation. No melena or hematochezia.  GENITOURINARY: No dysuria, frequency, hematuria, or incontinence  NEUROLOGICAL: No headaches, memory loss, loss of strength, numbness, or tremors  SKIN: No itching, burning, rashes, or lesions   LYMPH NODES: No enlarged glands  ENDOCRINE: No heat or cold intolerance; No hair loss  MUSCULOSKELETAL: No joint pain or swelling; No muscle, back, or extremity pain  PSYCHIATRIC: No depression, anxiety, mood swings, or difficulty sleeping  HEME/LYMPH: No easy bruising, or bleeding gums  ALLERY AND IMMUNOLOGIC: No hives or eczema    MEDICATIONS  (STANDING):  sodium chloride 0.9%. 1000 milliLiter(s) (75 mL/Hr) IV Continuous <Continuous>  insulin lispro (HumaLOG) corrective regimen sliding scale   SubCutaneous three times a day before meals  insulin lispro (HumaLOG) corrective regimen sliding scale   SubCutaneous at bedtime  dextrose 5%. 1000 milliLiter(s) (50 mL/Hr) IV Continuous <Continuous>  dextrose 50% Injectable 12.5 Gram(s) IV Push once  dextrose 50% Injectable 25 Gram(s) IV Push once  dextrose 50% Injectable 25 Gram(s) IV Push once  methadone    Tablet 5 milliGRAM(s) Oral <User Schedule>  dexamethasone     Tablet 2 milliGRAM(s) Oral two times a day    MEDICATIONS  (PRN):  ondansetron    Tablet 4 milliGRAM(s) Oral two times a day PRN Nausea and/or Vomiting  dextrose Gel 1 Dose(s) Oral once PRN Blood Glucose LESS THAN 70 milliGRAM(s)/deciliter  glucagon  Injectable 1 milliGRAM(s) IntraMuscular once PRN Glucose LESS THAN 70 milligrams/deciliter  LORazepam     Tablet 1 milliGRAM(s) Oral two times a day PRN Anxiety  HYDROmorphone  Injectable 0.5 milliGRAM(s) IV Push every 2 hours PRN Breakthrough pain  naloxone Injectable 0.1 milliGRAM(s) IV Push every 3 minutes PRN Sedation or respiratory depression RR < 10      PHYSICAL EXAM:  Vital Signs Last 24 Hrs  T(C): 37.1 (14 Sep 2017 12:11), Max: 37.1 (14 Sep 2017 12:11)  T(F): 98.8 (14 Sep 2017 12:11), Max: 98.8 (14 Sep 2017 12:11)  HR: 63 (14 Sep 2017 12:11) (55 - 64)  BP: 164/86 (14 Sep 2017 12:11) (132/60 - 171/79)  BP(mean): --  RR: 18 (14 Sep 2017 12:11) (16 - 18)  SpO2: 97% (14 Sep 2017 12:11) (97% - 100%)  I&O's Summary    13 Sep 2017 07:01  -  14 Sep 2017 07:00  --------------------------------------------------------  IN: 840 mL / OUT: 0 mL / NET: 840 mL    14 Sep 2017 07:01  -  14 Sep 2017 17:35  --------------------------------------------------------  IN: 920 mL / OUT: 0 mL / NET: 920 mL      GENERAL: NAD, well-developed  HEAD:  Atraumatic, Normocephalic  EYES: EOMI, PERRLA, conjunctiva and sclera clear  NECK: Supple, No JVD  CHEST/LUNG: Clear to auscultation bilaterally; No wheeze  HEART: Regular rate and rhythm; No murmurs, rubs, or gallops  ABDOMEN: Soft, Nontender, Nondistended; Bowel sounds present  EXTREMITIES:  2+ Peripheral Pulses, No clubbing, cyanosis, or edema  PSYCH: AAOx3  NEUROLOGY: non-focal  SKIN: No rashes or lesions    LABS:  CAPILLARY BLOOD GLUCOSE  207 (14 Sep 2017 17:33)  211 (14 Sep 2017 12:15)  260 (14 Sep 2017 08:53)  233 (13 Sep 2017 21:45)  284 (13 Sep 2017 20:15)                              12.8   6.0   )-----------( 118      ( 13 Sep 2017 12:56 )             37.3         137  |  105  |  10  ----------------------------<  239<H>  3.2<L>   |  23  |  0.57    Ca    7.3<L>      13 Sep 2017 21:49    TPro  10.4<H>  /  Alb  3.1<L>  /  TBili  1.2  /  DBili  x   /  AST  42<H>  /  ALT  60<H>  /  AlkPhos  388<H>      PT/INR - ( 13 Sep 2017 12:56 )   PT: 13.6 sec;   INR: 1.24 ratio         PTT - ( 13 Sep 2017 12:56 )  PTT:31.6 sec      Urinalysis Basic - ( 13 Sep 2017 14:12 )    Color: x / Appearance: Clear / S.030 / pH: x  Gluc: x / Ketone: Negative  / Bili: Negative / Urobili: Negative   Blood: x / Protein: Negative / Nitrite: Negative   Leuk Esterase: Negative / RBC: 0-2 /HPF / WBC 0-2 /HPF   Sq Epi: x / Non Sq Epi: x / Bacteria: x        RADIOLOGY & ADDITIONAL TESTS:    Imaging Personally Reviewed:    Consultant(s) Notes Reviewed:    Palliative  Care Discussed with Consultants/Other Providers:

## 2017-09-14 NOTE — CONSULT NOTE ADULT - SUBJECTIVE AND OBJECTIVE BOX
HPI:  62 y/o w/m h/o liver CA, pancreatitis, Hep-C , HTN ,IDDM. was at inpatient Hospice and was found wandering in another patients room and smearing feces according to EMS. As per [facility] nurse liaison, patient was found to have Temazepam and MS Contin that he appeared to be taking without informing the providers at inpatient hospice.  At the same time the patient was on a Dilaudid PCA and as per hospice, the patient may have become Delirious because of intoxication due to opioids and benzodiazepines.        PERTINENT PMH REVIEWED:  [x ] YES [ ] NO           SOCIAL HISTORY:   Significant other/partner:               Children:    2               Oriental orthodox/Spirituality: Restorationist   Substance hx:  [ ] YES   [x] NO                   Tobacco hx:  [ x] YES  [ ] NO                       Alcohol hx: [ ] YES  [ ] NO         Home Opioid hx:  [x ] YES  [ ] NO   Living Situation: [ x] Home  [ ] Long term care  [ ] Rehab [ ] Other  Patient indicated history of polysubstance abuse (Benzodiazepines and when he was young Cocaine)     FAMILY HISTORY:  No pertinent family history in first degree relatives    [ ] Family history non-contributory     BASELINE (I)ADLs (prior to admission):  [place]: [ ] total  [x ] moderate [ ] dependent    ADVANCE DIRECTIVES:    DNR [ ] YES [x ] NO                            [ ] Completed  MOLST  [ ] YES [x ] NO                      [ ] Completed  Health Care Proxy [ x] YES  [ ] NO   [ ] Completed.   Living Will  [ ] YES [ ] NO             [ ] Surrogate  [x ] HCP  [ ] Guardian:        Nevaeh Serrnao                                                          Phone#:  [phone]    Allergies    No Known Allergies    Intolerances        MEDICATIONS  (STANDING):  sodium chloride 0.9%. 1000 milliLiter(s) (75 mL/Hr) IV Continuous <Continuous>  insulin lispro (HumaLOG) corrective regimen sliding scale   SubCutaneous three times a day before meals  insulin lispro (HumaLOG) corrective regimen sliding scale   SubCutaneous at bedtime  dextrose 5%. 1000 milliLiter(s) (50 mL/Hr) IV Continuous <Continuous>  dextrose 50% Injectable 12.5 Gram(s) IV Push once  dextrose 50% Injectable 25 Gram(s) IV Push once  dextrose 50% Injectable 25 Gram(s) IV Push once  methadone    Tablet 5 milliGRAM(s) Oral <User Schedule>  dexamethasone     Tablet 2 milliGRAM(s) Oral two times a day    MEDICATIONS  (PRN):  ondansetron    Tablet 4 milliGRAM(s) Oral two times a day PRN Nausea and/or Vomiting  dextrose Gel 1 Dose(s) Oral once PRN Blood Glucose LESS THAN 70 milliGRAM(s)/deciliter  glucagon  Injectable 1 milliGRAM(s) IntraMuscular once PRN Glucose LESS THAN 70 milligrams/deciliter  LORazepam     Tablet 1 milliGRAM(s) Oral two times a day PRN Anxiety  HYDROmorphone  Injectable 0.5 milliGRAM(s) IV Push every 2 hours PRN Breakthrough pain  naloxone Injectable 0.1 milliGRAM(s) IV Push every 3 minutes PRN Sedation or respiratory depression RR < 10      PRESENT SYMPTOMS:  Source: [x ] Patient   [ ] Family   [ ] Team     Pain:                        [ ] No [x ] Yes             [ ] Mild [ ] Moderate [x ] Severe    Onset - chronic   Location - RUQ and Epigastric region   Duration - Constant   Character - Cramping, Stabbing    Alleviating/Aggravating - Dilaudid   Radiation - band like towards LUQ   Timing - none      Dyspnea:                [x ] No [ ] Yes             [ ] Mild [ ] Moderate [ ] Severe    Anxiety:                  [ ] No [x ] Yes             [ ] Mild [x ] Moderate [ ] Severe    Fatigue:                  [ ] No [x ] Yes             [ ] Mild [x ] Moderate [ ] Severe    Nausea:                  [x ] No [ ] Yes             [ ] Mild [ ] Moderate [ ] Severe    Loss of appetite:   [ ] No [x ] Yes             [x ] Mild [ ] Moderate [ ] Severe    Constipation:        [x ] No [ ] Yes             [ ] Mild [ ] Moderate [ ] Severe    Other Symptoms:  [x ] All other review of systems negative   [ ] Unable to obtain due to poor mentation     Karnofsky Performance Score/Palliative Performance Status Version 2:  30-40       %    PHYSICAL EXAM:  Vital Signs Last 24 Hrs  T(C): 37.1 (14 Sep 2017 12:11), Max: 37.2 (13 Sep 2017 16:48)  T(F): 98.8 (14 Sep 2017 12:11), Max: 98.9 (13 Sep 2017 16:48)  HR: 63 (14 Sep 2017 12:11) (55 - 64)  BP: 164/86 (14 Sep 2017 12:11) (132/60 - 171/79)  BP(mean): --  RR: 18 (14 Sep 2017 12:11) (16 - 18)  SpO2: 97% (14 Sep 2017 12:11) (97% - 100%) I&O's Summary    13 Sep 2017 07:01  -  14 Sep 2017 07:00  --------------------------------------------------------  IN: 840 mL / OUT: 0 mL / NET: 840 mL    14 Sep 2017 07:01  -  14 Sep 2017 16:32  --------------------------------------------------------  IN: 920 mL / OUT: 0 mL / NET: 920 mL        General:  [ x ]  Alert, Oriented x 3. Cachexia     HEENT:  [ ] Normal   [x ] Dry mouth   [ ] ET Tube    [ ] Trach  [ ] Oral lesions    Lungs:   [x ] Clear [ ] Tachypnea  [ ] Audible excessive secretions   [ ] Rhonchi        [ ] Right [ ] Left [ ] Bilateral  [ ] Crackles        [ ] Right [ ] Left [ ] Bilateral  [ ] Wheezing     [ ] Right [ ] Left [ ] Bilateral    Cardiovascular:  S1, S2. No S3. No murmurs     Abdomen: [ ] Soft  [x ] Distended   [ ] +BS  [ ] Non tender [x ] Tender over RUQ, Epigastric region, and maci-umbilical area [ ]PEG   [ ]OGT/ NGT   Last BM:       Genitourinary: [x ] Normal [ ] Incontinent   [ ] Oliguria/Anuria   [ ] Alatorre    Musculoskeletal:  [ ] Normal   [x ] Weakness  [ ] Bedbound/Wheelchair bound [ ] Edema    Neurological: [x ] No focal deficits  [ ] Cognitive impairment  [ ] Dysphagia [ ] Dysarthria [ ] Paresis [ ] Other     Skin: [x ] Normal   [ ] Pressure ulcer(s)                  [ ] Rash    LABS:                        12.8   6.0   )-----------( 118      ( 13 Sep 2017 12:56 )             37.3     09-13    137  |  105  |  10  ----------------------------<  239<H>  3.2<L>   |  23  |  0.57    Ca    7.3<L>      13 Sep 2017 21:49    TPro  10.4<H>  /  Alb  3.1<L>  /  TBili  1.2  /  DBili  x   /  AST  42<H>  /  ALT  60<H>  /  AlkPhos  388<H>  -    PT/INR - ( 13 Sep 2017 12:56 )   PT: 13.6 sec;   INR: 1.24 ratio         PTT - ( 13 Sep 2017 12:56 )  PTT:31.6 sec  Urinalysis Basic - ( 13 Sep 2017 14:12 )    Color: x / Appearance: Clear / S.030 / pH: x  Gluc: x / Ketone: Negative  / Bili: Negative / Urobili: Negative   Blood: x / Protein: Negative / Nitrite: Negative   Leuk Esterase: Negative / RBC: 0-2 /HPF / WBC 0-2 /HPF   Sq Epi: x / Non Sq Epi: x / Bacteria: x        Shock: [ ] Septic [ ] Cardiogenic [ ] Neurologic [ ] Hypovolemic  Vasopressors x   Inotrophs x     Protein Calorie Malnutrition: [ ] Mild [ ] Moderate [ ] Severe    Oral Intake: [ ] Unable/mouth care only [ ] Minimal [ ] Moderate [ ] Full Capability  Diet: [ ] NPO [ ] Tube feeds [ ] TPN [ ] Other     RADIOLOGY & ADDITIONAL STUDIES:  < from: MRI Abdomen w/wo Cont (17 @ 08:47) >  EXAM:  MRI ABDOMEN WOW CON        PROCEDURE DATE:  2017  IMPRESSION: Cirrhosis.    Dominant liver mass in segments 5/8 is unchanged in size from most recent   CT dated May 23, 2017. Extensive right portal vein tumor invasion   extending just into the left and main portal vein is unchanged.    Another focus of hepatocellular carcinoma in segment 2 is mildly   increased in size compared with the MRI from 2017.    < end of copied text >    REFERRALS:   [ ] Chaplaincy  [ ] Hospice  [ ] Child Life  [ ] Social Work  [ ] Case management [ ] Holistic Therapy     Dominick Wilkerson MD [phone]  Assessment and Plan:

## 2017-09-14 NOTE — CONSULT NOTE ADULT - PROBLEM SELECTOR RECOMMENDATION 2
DC Oxycodone ATC and Morphine IV (2/2 to liver impairment)   Start Methadone 5 mg 6/14/22 ATC and Dilaudid 0.5 mg IV q 2 PRN.   Will also add Dexa 2 mg PO bid.  Naloxone PRN   May consider addiction psych eval

## 2017-09-14 NOTE — CONSULT NOTE ADULT - PROBLEM SELECTOR RECOMMENDATION 9
Improved. Likely secondary to prescription medications abuse. I d/w the patient about the risks (including death) of prescription medications and also about the risks of using medications in the hospital without healthcare providers being informed and he gave verbalized understanding about it and indicated it will not happen again.

## 2017-09-15 ENCOUNTER — TRANSCRIPTION ENCOUNTER (OUTPATIENT)
Age: 62
End: 2017-09-15

## 2017-09-15 LAB
HCT VFR BLD CALC: 36.4 % — LOW (ref 39–50)
HGB BLD-MCNC: 12.5 G/DL — LOW (ref 13–17)
MCHC RBC-ENTMCNC: 32 PG — SIGNIFICANT CHANGE UP (ref 27–34)
MCHC RBC-ENTMCNC: 34.3 GM/DL — SIGNIFICANT CHANGE UP (ref 32–36)
MCV RBC AUTO: 93.1 FL — SIGNIFICANT CHANGE UP (ref 80–100)
PLATELET # BLD AUTO: 133 K/UL — LOW (ref 150–400)
RBC # BLD: 3.91 M/UL — LOW (ref 4.2–5.8)
RBC # FLD: 15.2 % — HIGH (ref 10.3–14.5)
WBC # BLD: 6.66 K/UL — SIGNIFICANT CHANGE UP (ref 3.8–10.5)
WBC # FLD AUTO: 6.66 K/UL — SIGNIFICANT CHANGE UP (ref 3.8–10.5)

## 2017-09-15 PROCEDURE — 99233 SBSQ HOSP IP/OBS HIGH 50: CPT | Mod: GV

## 2017-09-15 RX ORDER — DEXAMETHASONE 0.5 MG/5ML
2 ELIXIR ORAL
Qty: 0 | Refills: 0 | Status: DISCONTINUED | OUTPATIENT
Start: 2017-09-15 | End: 2017-09-18

## 2017-09-15 RX ORDER — FAMOTIDINE 10 MG/ML
20 INJECTION INTRAVENOUS ONCE
Qty: 0 | Refills: 0 | Status: COMPLETED | OUTPATIENT
Start: 2017-09-15 | End: 2017-09-16

## 2017-09-15 RX ORDER — INSULIN LISPRO 100/ML
3 VIAL (ML) SUBCUTANEOUS
Qty: 0 | Refills: 0 | Status: DISCONTINUED | OUTPATIENT
Start: 2017-09-15 | End: 2017-09-16

## 2017-09-15 RX ORDER — HYDROMORPHONE HYDROCHLORIDE 2 MG/ML
1 INJECTION INTRAMUSCULAR; INTRAVENOUS; SUBCUTANEOUS
Qty: 0 | Refills: 0 | Status: DISCONTINUED | OUTPATIENT
Start: 2017-09-15 | End: 2017-09-20

## 2017-09-15 RX ORDER — INSULIN GLARGINE 100 [IU]/ML
10 INJECTION, SOLUTION SUBCUTANEOUS AT BEDTIME
Qty: 0 | Refills: 0 | Status: DISCONTINUED | OUTPATIENT
Start: 2017-09-15 | End: 2017-09-16

## 2017-09-15 RX ADMIN — Medication 3 UNIT(S): at 18:23

## 2017-09-15 RX ADMIN — Medication 1 MILLIGRAM(S): at 09:13

## 2017-09-15 RX ADMIN — Medication 2 MILLIGRAM(S): at 03:39

## 2017-09-15 RX ADMIN — HYDROMORPHONE HYDROCHLORIDE 0.5 MILLIGRAM(S): 2 INJECTION INTRAMUSCULAR; INTRAVENOUS; SUBCUTANEOUS at 17:15

## 2017-09-15 RX ADMIN — Medication 3: at 13:35

## 2017-09-15 RX ADMIN — HYDROMORPHONE HYDROCHLORIDE 0.5 MILLIGRAM(S): 2 INJECTION INTRAMUSCULAR; INTRAVENOUS; SUBCUTANEOUS at 01:37

## 2017-09-15 RX ADMIN — HYDROMORPHONE HYDROCHLORIDE 1 MILLIGRAM(S): 2 INJECTION INTRAMUSCULAR; INTRAVENOUS; SUBCUTANEOUS at 20:25

## 2017-09-15 RX ADMIN — HYDROMORPHONE HYDROCHLORIDE 1 MILLIGRAM(S): 2 INJECTION INTRAMUSCULAR; INTRAVENOUS; SUBCUTANEOUS at 23:20

## 2017-09-15 RX ADMIN — Medication 3: at 09:08

## 2017-09-15 RX ADMIN — SODIUM CHLORIDE 75 MILLILITER(S): 9 INJECTION INTRAMUSCULAR; INTRAVENOUS; SUBCUTANEOUS at 12:05

## 2017-09-15 RX ADMIN — HYDROMORPHONE HYDROCHLORIDE 1 MILLIGRAM(S): 2 INJECTION INTRAMUSCULAR; INTRAVENOUS; SUBCUTANEOUS at 22:50

## 2017-09-15 RX ADMIN — HYDROMORPHONE HYDROCHLORIDE 0.5 MILLIGRAM(S): 2 INJECTION INTRAMUSCULAR; INTRAVENOUS; SUBCUTANEOUS at 04:09

## 2017-09-15 RX ADMIN — Medication 2 MILLIGRAM(S): at 22:08

## 2017-09-15 RX ADMIN — METHADONE HYDROCHLORIDE 5 MILLIGRAM(S): 40 TABLET ORAL at 22:08

## 2017-09-15 RX ADMIN — HYDROMORPHONE HYDROCHLORIDE 0.5 MILLIGRAM(S): 2 INJECTION INTRAMUSCULAR; INTRAVENOUS; SUBCUTANEOUS at 03:39

## 2017-09-15 RX ADMIN — HYDROMORPHONE HYDROCHLORIDE 0.5 MILLIGRAM(S): 2 INJECTION INTRAMUSCULAR; INTRAVENOUS; SUBCUTANEOUS at 16:51

## 2017-09-15 RX ADMIN — HYDROMORPHONE HYDROCHLORIDE 1 MILLIGRAM(S): 2 INJECTION INTRAMUSCULAR; INTRAVENOUS; SUBCUTANEOUS at 20:55

## 2017-09-15 RX ADMIN — HYDROMORPHONE HYDROCHLORIDE 0.5 MILLIGRAM(S): 2 INJECTION INTRAMUSCULAR; INTRAVENOUS; SUBCUTANEOUS at 12:30

## 2017-09-15 RX ADMIN — HYDROMORPHONE HYDROCHLORIDE 0.5 MILLIGRAM(S): 2 INJECTION INTRAMUSCULAR; INTRAVENOUS; SUBCUTANEOUS at 01:22

## 2017-09-15 RX ADMIN — HYDROMORPHONE HYDROCHLORIDE 0.5 MILLIGRAM(S): 2 INJECTION INTRAMUSCULAR; INTRAVENOUS; SUBCUTANEOUS at 12:05

## 2017-09-15 RX ADMIN — METHADONE HYDROCHLORIDE 5 MILLIGRAM(S): 40 TABLET ORAL at 06:46

## 2017-09-15 RX ADMIN — METHADONE HYDROCHLORIDE 5 MILLIGRAM(S): 40 TABLET ORAL at 13:35

## 2017-09-15 RX ADMIN — HYDROMORPHONE HYDROCHLORIDE 0.5 MILLIGRAM(S): 2 INJECTION INTRAMUSCULAR; INTRAVENOUS; SUBCUTANEOUS at 07:53

## 2017-09-15 RX ADMIN — INSULIN GLARGINE 10 UNIT(S): 100 INJECTION, SOLUTION SUBCUTANEOUS at 22:08

## 2017-09-15 RX ADMIN — HYDROMORPHONE HYDROCHLORIDE 0.5 MILLIGRAM(S): 2 INJECTION INTRAMUSCULAR; INTRAVENOUS; SUBCUTANEOUS at 08:15

## 2017-09-15 RX ADMIN — Medication 3: at 18:22

## 2017-09-15 RX ADMIN — Medication 1 MILLIGRAM(S): at 18:27

## 2017-09-15 NOTE — PROGRESS NOTE ADULT - PROBLEM SELECTOR PLAN 2
Will start Lorazepam 1 mg PO q 12 hours as recommended by Psych.   Will start Lorazepam 1 mg q 8 PRN

## 2017-09-15 NOTE — PROGRESS NOTE ADULT - ASSESSMENT
60 y/o w/m h/o liver CA, pancreatitis, Hep-C , HTN ,IDDM admitted with Delirium in the setting of prescription medications abuse. Consult called for symptomatic Rx (pain/Anxiety) and resources (hospice referral)

## 2017-09-15 NOTE — DISCHARGE NOTE ADULT - [id number]_GEN_ALL_CORE
Requires supervison due to deteriorating mental status/Pain greater than 7 on scale of 10 on ambulation/Fall risk

## 2017-09-15 NOTE — DISCHARGE NOTE ADULT - MEDICATION SUMMARY - MEDICATIONS TO STOP TAKING
I will STOP taking the medications listed below when I get home from the hospital:    oxyCODONE 15 mg oral tablet  -- 1 tab(s) by mouth 3 times a day    temazepam  --  by mouth    morphine  --  by mouth    prochlorperazine  --  by mouth

## 2017-09-15 NOTE — PROGRESS NOTE ADULT - SUBJECTIVE AND OBJECTIVE BOX
Patient is a 61y old  Male who presents with a chief complaint of Lethargy (15 Sep 2017 16:58)      SUBJECTIVE / OVERNIGHT EVENTS:  Continues to complain of abdominal pain, claims his pain medications are not effective.  Review of Systems:   CONSTITUTIONAL: No fever, weight loss, or fatigue  EYES: No eye pain, visual disturbances, or discharge  ENMT:  No difficulty hearing, tinnitus, vertigo; No sinus or throat pain  NECK: No pain or stiffness  BREASTS: No pain, masses, or nipple discharge  RESPIRATORY: No cough, wheezing, chills or hemoptysis; No shortness of breath  CARDIOVASCULAR: No chest pain, palpitations, dizziness, or leg swelling  GASTROINTESTINAL:No nausea, vomiting, or hematemesis; No diarrhea or constipation. No melena or hematochezia.  GENITOURINARY: No dysuria, frequency, hematuria, or incontinence  NEUROLOGICAL: No headaches, memory loss, loss of strength, numbness, or tremors  SKIN: No itching, burning, rashes, or lesions   LYMPH NODES: No enlarged glands  ENDOCRINE: No heat or cold intolerance; No hair loss  MUSCULOSKELETAL: No joint pain or swelling; No muscle, back, or extremity pain  PSYCHIATRIC: No depression, anxiety, mood swings, or difficulty sleeping  HEME/LYMPH: No easy bruising, or bleeding gums  ALLERY AND IMMUNOLOGIC: No hives or eczema    MEDICATIONS  (STANDING):  insulin lispro (HumaLOG) corrective regimen sliding scale   SubCutaneous three times a day before meals  insulin lispro (HumaLOG) corrective regimen sliding scale   SubCutaneous at bedtime  dextrose 5%. 1000 milliLiter(s) (50 mL/Hr) IV Continuous <Continuous>  dextrose 50% Injectable 12.5 Gram(s) IV Push once  dextrose 50% Injectable 25 Gram(s) IV Push once  dextrose 50% Injectable 25 Gram(s) IV Push once  methadone    Tablet 5 milliGRAM(s) Oral <User Schedule>  insulin glargine Injectable (LANTUS) 10 Unit(s) SubCutaneous at bedtime  insulin lispro Injectable (HumaLOG) 3 Unit(s) SubCutaneous three times a day before meals  dexamethasone     Tablet 2 milliGRAM(s) Oral two times a day  LORazepam     Tablet 1 milliGRAM(s) Oral two times a day    MEDICATIONS  (PRN):  ondansetron    Tablet 4 milliGRAM(s) Oral two times a day PRN Nausea and/or Vomiting  dextrose Gel 1 Dose(s) Oral once PRN Blood Glucose LESS THAN 70 milliGRAM(s)/deciliter  glucagon  Injectable 1 milliGRAM(s) IntraMuscular once PRN Glucose LESS THAN 70 milligrams/deciliter  naloxone Injectable 0.1 milliGRAM(s) IV Push every 3 minutes PRN Sedation or respiratory depression RR < 10  HYDROmorphone  Injectable 1 milliGRAM(s) IV Push every 2 hours PRN Breakthrough pain  LORazepam     Tablet 1 milliGRAM(s) Oral every 8 hours PRN Anxiety      PHYSICAL EXAM:  Vital Signs Last 24 Hrs  T(C): 37.1 (15 Sep 2017 09:30), Max: 37.1 (15 Sep 2017 03:35)  T(F): 98.7 (15 Sep 2017 09:30), Max: 98.8 (15 Sep 2017 03:35)  HR: 59 (15 Sep 2017 09:30) (59 - 64)  BP: 168/79 (15 Sep 2017 09:30) (144/65 - 168/79)  BP(mean): --  RR: 18 (15 Sep 2017 09:30) (18 - 18)  SpO2: 96% (15 Sep 2017 09:30) (96% - 100%)  I&O's Summary    14 Sep 2017 07:01  -  15 Sep 2017 07:00  --------------------------------------------------------  IN: 3320 mL / OUT: 0 mL / NET: 3320 mL    15 Sep 2017 07:01  -  15 Sep 2017 18:37  --------------------------------------------------------  IN: 1450 mL / OUT: 0 mL / NET: 1450 mL      GENERAL: NAD, well-developed  HEAD:  Atraumatic, Normocephalic  EYES: EOMI, PERRLA, conjunctiva and sclera clear  NECK: Supple, No JVD  CHEST/LUNG: Clear to auscultation bilaterally; No wheeze  HEART: Regular rate and rhythm; No murmurs, rubs, or gallops  ABDOMEN: Soft, Nontender, Nondistended; Bowel sounds present  EXTREMITIES:  2+ Peripheral Pulses, No clubbing, cyanosis, or edema  PSYCH: AAOx3  NEUROLOGY: non-focal  SKIN: No rashes or lesions    LABS:  CAPILLARY BLOOD GLUCOSE  272 (15 Sep 2017 16:47)  288 (15 Sep 2017 11:44)  254 (15 Sep 2017 07:44)  231 (14 Sep 2017 22:18)                              12.5   6.66  )-----------( 133      ( 15 Sep 2017 09:03 )             36.4     09-13    137  |  105  |  10  ----------------------------<  239<H>  3.2<L>   |  23  |  0.57    Ca    7.3<L>      13 Sep 2017 21:49                RADIOLOGY & ADDITIONAL TESTS:    Imaging Personally Reviewed:    Consultant(s) Notes Reviewed:      Care Discussed with Consultants/Other Providers:

## 2017-09-15 NOTE — DISCHARGE NOTE ADULT - CARE PROVIDERS DIRECT ADDRESSES
,DirectAddress_Unknown ,DirectAddress_Unknown,[email or web address]

## 2017-09-15 NOTE — PROGRESS NOTE ADULT - PROBLEM SELECTOR PLAN 3
Resolved.   Likely related to polysubstance abuse  Yesterday and today, the patient verbalized understanding about the risks of using other opiods or sedatives while using prescribed medications. He understands that death may be the outcome of it and he indicated he will not inquire on the same situation that brought him to the hospital.  Consider addiction psych eval. Methadone for pain Rx was started.

## 2017-09-15 NOTE — DISCHARGE NOTE ADULT - PROVIDER TOKENS
FREE:[LAST:[Dr. Guillen],PHONE:[(   )    -],FAX:[(   )    -],ADDRESS:[[phone]  Primary care doctor]] FREE:[LAST:[Dr. Guillen],PHONE:[(   )    -],FAX:[(   )    -],ADDRESS:[[phone]  Primary care doctor]],TOKEN:'[email or web address]'

## 2017-09-15 NOTE — DISCHARGE NOTE ADULT - HOME CARE AGENCY
[place] [phone] for RN visit to assess for home P/T for start of care the day after discharge Visiting Nurse Service [phone] for RN visit to assess for insulin administration teaching and to assess for home P/T for start of care the day after discharge

## 2017-09-15 NOTE — PROGRESS NOTE ADULT - SUBJECTIVE AND OBJECTIVE BOX
HPI:  62 y/o w/m h/o liver CA, pancreatitis, Hep-C , HTN ,IDDM. was at inpatient Hospice and was found wandering in another patients room and smearing feces according to EMS. As per [facility] nurse liaison, patient was found to have Temazepam and MS Contin that he appeared to be taking without informing the providers at inpatient hospice.  At the same time the patient was on a Dilaudid PCA and as per hospice, the patient may have become Delirious because of intoxication due to opioids and benzodiazepines.     We are currently f/u for symptoms and resources.       PERTINENT PMH REVIEWED:  [x ] YES [ ] NO           SOCIAL HISTORY:   Significant other/partner:               Children:    2               Taoist/Spirituality: Caodaism   Substance hx:  [ ] YES   [x] NO                   Tobacco hx:  [ x] YES  [ ] NO                       Alcohol hx: [ ] YES  [ ] NO         Home Opioid hx:  [x ] YES  [ ] NO   Living Situation: [ x] Home  [ ] Long term care  [ ] Rehab [ ] Other  Patient indicated history of polysubstance abuse (Benzodiazepines and when he was young Cocaine)     FAMILY HISTORY:  No pertinent family history in first degree relatives    [ ] Family history non-contributory     BASELINE (I)ADLs (prior to admission):  [place]: [ ] total  [x ] moderate [ ] dependent    ADVANCE DIRECTIVES:    DNR [ ] YES [x ] NO                            [ ] Completed  MOLST  [ ] YES [x ] NO                      [ ] Completed  Health Care Proxy [ x] YES  [ ] NO   [ ] Completed.   Living Will  [ ] YES [ ] NO             [ ] Surrogate  [x ] HCP  [ ] Guardian:        Nevaeh Serrano                                                          Phone#:  [phone]    Allergies    No Known Allergies    Intolerances        MEDICATIONS  (STANDING):  insulin lispro (HumaLOG) corrective regimen sliding scale   SubCutaneous three times a day before meals  insulin lispro (HumaLOG) corrective regimen sliding scale   SubCutaneous at bedtime  dextrose 5%. 1000 milliLiter(s) (50 mL/Hr) IV Continuous <Continuous>  dextrose 50% Injectable 12.5 Gram(s) IV Push once  dextrose 50% Injectable 25 Gram(s) IV Push once  dextrose 50% Injectable 25 Gram(s) IV Push once  methadone    Tablet 5 milliGRAM(s) Oral <User Schedule>  insulin glargine Injectable (LANTUS) 10 Unit(s) SubCutaneous at bedtime  insulin lispro Injectable (HumaLOG) 3 Unit(s) SubCutaneous three times a day before meals  dexamethasone     Tablet 2 milliGRAM(s) Oral two times a day  LORazepam     Tablet 1 milliGRAM(s) Oral two times a day    MEDICATIONS  (PRN):  ondansetron    Tablet 4 milliGRAM(s) Oral two times a day PRN Nausea and/or Vomiting  dextrose Gel 1 Dose(s) Oral once PRN Blood Glucose LESS THAN 70 milliGRAM(s)/deciliter  glucagon  Injectable 1 milliGRAM(s) IntraMuscular once PRN Glucose LESS THAN 70 milligrams/deciliter  naloxone Injectable 0.1 milliGRAM(s) IV Push every 3 minutes PRN Sedation or respiratory depression RR < 10  HYDROmorphone  Injectable 1 milliGRAM(s) IV Push every 2 hours PRN Breakthrough pain  LORazepam     Tablet 1 milliGRAM(s) Oral every 8 hours PRN Anxiety        PRESENT SYMPTOMS:  Source: [x ] Patient   [ ] Family   [ ] Team     Pain:                        [ ] No [x ] Yes             [ ] Mild [ ] Moderate [x ] Severe    Onset - chronic   Location - RUQ and Epigastric region   Duration - Constant   Character - Cramping, Stabbing    Alleviating/Aggravating - Dilaudid   Radiation - band like towards LUQ   Timing - none      Dyspnea:                [x ] No [ ] Yes             [ ] Mild [ ] Moderate [ ] Severe    Anxiety:                  [ ] No [x ] Yes             [ ] Mild [x ] Moderate [ ] Severe    Fatigue:                  [ ] No [x ] Yes             [ ] Mild [x ] Moderate [ ] Severe    Nausea:                  [x ] No [ ] Yes             [ ] Mild [ ] Moderate [ ] Severe    Loss of appetite:   [ ] No [x ] Yes             [x ] Mild [ ] Moderate [ ] Severe    Constipation:        [x ] No [ ] Yes             [ ] Mild [ ] Moderate [ ] Severe    Other Symptoms:  [x ] All other review of systems negative   [ ] Unable to obtain due to poor mentation     Karnofsky Performance Score/Palliative Performance Status Version 2:  30-40       %    PHYSICAL EXAM:  Vital Signs Last 24 Hrs  T(C): 37.1 (14 Sep 2017 12:11), Max: 37.2 (13 Sep 2017 16:48)  T(F): 98.8 (14 Sep 2017 12:11), Max: 98.9 (13 Sep 2017 16:48)  HR: 63 (14 Sep 2017 12:11) (55 - 64)  BP: 164/86 (14 Sep 2017 12:11) (132/60 - 171/79)  BP(mean): --  RR: 18 (14 Sep 2017 12:11) (16 - 18)  SpO2: 97% (14 Sep 2017 12:11) (97% - 100%) I&O's Summary    13 Sep 2017 07:01  -  14 Sep 2017 07:00  --------------------------------------------------------  IN: 840 mL / OUT: 0 mL / NET: 840 mL    14 Sep 2017 07:01  -  14 Sep 2017 16:32  --------------------------------------------------------  IN: 920 mL / OUT: 0 mL / NET: 920 mL        General:  [ x ]  Alert, Oriented x 3. Cachexia     HEENT:  [ ] Normal   [x ] Dry mouth   [ ] ET Tube    [ ] Trach  [ ] Oral lesions    Lungs:   [x ] Clear [ ] Tachypnea  [ ] Audible excessive secretions   [ ] Rhonchi        [ ] Right [ ] Left [ ] Bilateral  [ ] Crackles        [ ] Right [ ] Left [ ] Bilateral  [ ] Wheezing     [ ] Right [ ] Left [ ] Bilateral    Cardiovascular:  S1, S2. No S3. No murmurs     Abdomen: [ ] Soft  [x ] Distended   [ ] +BS  [ ] Non tender [x ] Tender over RUQ, Epigastric region, and maci-umbilical area [ ]PEG   [ ]OGT/ NGT   Last BM:   9/15    Genitourinary: [x ] Normal [ ] Incontinent   [ ] Oliguria/Anuria   [ ] Alatorre    Musculoskeletal:  [ ] Normal   [x ] Weakness  [ ] Bedbound/Wheelchair bound [ ] Edema    Neurological: [x ] No focal deficits  [ ] Cognitive impairment  [ ] Dysphagia [ ] Dysarthria [ ] Paresis [ ] Other     Skin: [x ] Normal   [ ] Pressure ulcer(s)                  [ ] Rash    LABS:                        12.5   6.66  )-----------( 133      ( 15 Sep 2017 09:03 )             36.4                            Shock: [ ] Septic [ ] Cardiogenic [ ] Neurologic [ ] Hypovolemic  Vasopressors x   Inotrophs x     Protein Calorie Malnutrition: [ ] Mild [ ] Moderate [ ] Severe    Oral Intake: [ ] Unable/mouth care only [ ] Minimal [ ] Moderate [ ] Full Capability  Diet: [ ] NPO [ ] Tube feeds [ ] TPN [ ] Other     RADIOLOGY & ADDITIONAL STUDIES:  < from: MRI Abdomen w/wo Cont (06.17.17 @ 08:47) >  EXAM:  MRI ABDOMEN WOW CON        PROCEDURE DATE:  06/17/2017  IMPRESSION: Cirrhosis.    Dominant liver mass in segments 5/8 is unchanged in size from most recent   CT dated May 23, 2017. Extensive right portal vein tumor invasion   extending just into the left and main portal vein is unchanged.    Another focus of hepatocellular carcinoma in segment 2 is mildly   increased in size compared with the MRI from April 24, 2017.    < end of copied text >    REFERRALS:   [ ] Chaplaincy  [ ] Hospice  [ ] Child Life  [ ] Social Work  [ ] Case management [ ] Holistic Therapy     Dominick Wilkerson MD [phone]  Assessment and Plan:

## 2017-09-15 NOTE — DISCHARGE NOTE ADULT - CARE PLAN
Principal Discharge DX:	Delirium due to another medical condition  Goal:	Mental status remains at pts baseline  Instructions for follow-up, activity and diet:	condition improved  Secondary Diagnosis:	Uncontrolled diabetes mellitus  Instructions for follow-up, activity and diet:	9/14: HgbA1c 9.4  Secondary Diagnosis:	Pain due to neoplasm  Instructions for follow-up, activity and diet:	Continue with pain control  Secondary Diagnosis:	Palliative care encounter  Instructions for follow-up, activity and diet:	Seen by Palliative for pain management- continue with pain medications as prescribed. Principal Discharge DX:	Liver cancer  Goal:	supportive/comfort care  Instructions for follow-up, activity and diet:	Hospice care  Continue pain medication  Secondary Diagnosis:	Delirium  Instructions for follow-up, activity and diet:	Improved  Secondary Diagnosis:	Uncontrolled type 2 diabetes mellitus with hyperglycemia, with long-term current use of insulin  Instructions for follow-up, activity and diet:	9/14: HgbA1c 9.4 Principal Discharge DX:	Liver cancer  Goal:	supportive/comfort care  Instructions for follow-up, activity and diet:	Continue pain medication  Follow up with  Secondary Diagnosis:	Delirium  Instructions for follow-up, activity and diet:	Improved  Secondary Diagnosis:	Uncontrolled type 2 diabetes mellitus with hyperglycemia, with long-term current use of insulin  Instructions for follow-up, activity and diet:	9/14: HgbA1c 9.4 Principal Discharge DX:	Liver cancer  Goal:	supportive/comfort care  Instructions for follow-up, activity and diet:	Continue pain medication  Follow up with your Primary care doctor  Secondary Diagnosis:	Delirium  Instructions for follow-up, activity and diet:	Improved  Secondary Diagnosis:	Uncontrolled type 2 diabetes mellitus with hyperglycemia, with long-term current use of insulin  Instructions for follow-up, activity and diet:	9/14: HgbA1c 9.4  Make sure you get your HgA1c checked every three months.  Keep a list of your current medications including injectables and over the counter medications and bring this medication list with you to all your doctor appointments.  If you have not seen your opthalmologist this year call for appointment.  Check your feet daily for redness, sores, or openings. Do not self treat. If no improvement in two days call your primary care physician for an appointment. Principal Discharge DX:	Liver cancer  Goal:	supportive/comfort care  Instructions for follow-up, activity and diet:	Continue pain medication  Follow up with your Primary care doctor in 1 week  Secondary Diagnosis:	Delirium  Instructions for follow-up, activity and diet:	Improved  Please return to the Emergency room for any acute changes of mental status  Secondary Diagnosis:	Uncontrolled type 2 diabetes mellitus with hyperglycemia, with long-term current use of insulin  Instructions for follow-up, activity and diet:	9/14: HgbA1c 9.4  Make sure you get your HgA1c checked every three months.  Keep a list of your current medications including injectables and over the counter medications and bring this medication list with you to all your doctor appointments.  If you have not seen your opthalmologist this year call for appointment.  Check your feet daily for redness, sores, or openings. Do not self treat. If no improvement in two days call your primary care physician for an appointment. Principal Discharge DX:	Liver cancer  Goal:	supportive/comfort care  Instructions for follow-up, activity and diet:	Continue pain medication  Follow up with your Primary care doctor in 1 week  Follow up with Pain management doctor-Dr. Lisa Roland in 1 week  Secondary Diagnosis:	Delirium  Instructions for follow-up, activity and diet:	Improved  Please return to the Emergency room for any acute changes of mental status  Follow up at [phone] in 1 week  Secondary Diagnosis:	Uncontrolled type 2 diabetes mellitus with hyperglycemia, with long-term current use of insulin  Instructions for follow-up, activity and diet:	9/14: HgbA1c 9.4  Make sure you get your HgA1c checked every three months.  Keep a list of your current medications including injectables and over the counter medications and bring this medication list with you to all your doctor appointments.  If you have not seen your opthalmologist this year call for appointment.  Check your feet daily for redness, sores, or openings. Do not self treat. If no improvement in two days call your primary care physician for an appointment.

## 2017-09-15 NOTE — PROGRESS NOTE ADULT - PROBLEM SELECTOR PLAN 1
Methadone 5 mg 6/14/22 was started yesterday afternoon. Will likely increase tomorrow based on opiods needs and if no adverse reactions are seen.   Will increase Dilaudid PRN to 1 mg IV q 2 PRN   Will increase Dexa to 2 mg PO bid

## 2017-09-15 NOTE — DISCHARGE NOTE ADULT - ADDITIONAL INSTRUCTIONS
Follow up with Primary care doctor  follow up with Pain management doctor-Dr. Lisa Roland  Follow up with Psychiatry- [facility]

## 2017-09-15 NOTE — DISCHARGE NOTE ADULT - SECONDARY DIAGNOSIS.
Uncontrolled diabetes mellitus Pain due to neoplasm Palliative care encounter Delirium Uncontrolled type 2 diabetes mellitus with hyperglycemia, with long-term current use of insulin

## 2017-09-15 NOTE — DISCHARGE NOTE ADULT - PLAN OF CARE
Mental status remains at pts baseline condition improved 9/14: bA1c 9.4 Continue with pain control Seen by Palliative for pain management- continue with pain medications as prescribed. supportive/comfort care Hospice care  Continue pain medication Improved Continue pain medication  Follow up with 9/14: HgbA1c 9.4  Make sure you get your HgA1c checked every three months.  Keep a list of your current medications including injectables and over the counter medications and bring this medication list with you to all your doctor appointments.  If you have not seen your opthalmologist this year call for appointment.  Check your feet daily for redness, sores, or openings. Do not self treat. If no improvement in two days call your primary care physician for an appointment. Continue pain medication  Follow up with your Primary care doctor Continue pain medication  Follow up with your Primary care doctor in 1 week Improved  Please return to the Emergency room for any acute changes of mental status Continue pain medication  Follow up with your Primary care doctor in 1 week  Follow up with Pain management doctor-Dr. Lisa Roland in 1 week Improved  Please return to the Emergency room for any acute changes of mental status  Follow up at [phone] in 1 week

## 2017-09-15 NOTE — PROGRESS NOTE ADULT - PROBLEM SELECTOR PLAN 4
Patient is full code for now.   He is ok with a hospice referral and likely for home hospice.  Floor SW and  to further help with the patient's psychosocial situation.

## 2017-09-15 NOTE — DISCHARGE NOTE ADULT - HOSPITAL COURSE
62 y/o w/m h/o liver CA, pancreatitis, Hep-C , HTN ,IDDM. Admitted for Delirium and Uncontrolled DM. Pt was initially placed on constant observation. Mental status improved and was discontinued. Pt was followed by Psychiatry and Palliative care team inpatient. Pain medication was adjusted. Endrocrinology team also consulted. Recommended Insulin twice a day. Pt was not accepted to hospice care (inpatient and home). Family to take care of patient at home with home care and PT serviced. RN did diabetic, insulin teaching with family. He is to follow up with PMD outpatient. 62 y/o w/m h/o liver CA, pancreatitis, Hep-C , HTN ,IDDM. Admitted for Delirium and Uncontrolled DM. Pt was initially placed on constant observation. Mental status improved and was discontinued. Pt was followed by Psychiatry and Palliative care team inpatient. Pain medication was adjusted. Endrocrinology team also consulted. Recommended Insulin twice a day. Pt was not accepted to hospice care (inpatient and home). Family is taking full responsibility of taking care of patient (including pain medication, insulin administration) 24/7.  Pt will receive home care and PT services. RN did diabetic, insulin teaching with family. He is to follow up with PMD outpatient.

## 2017-09-15 NOTE — DISCHARGE NOTE ADULT - MEDICATION SUMMARY - MEDICATIONS TO TAKE
I will START or STAY ON the medications listed below when I get home from the hospital:    Insulin Pen needles   -- 1 each subcutaneous 2 times a day   -- Indication: For Diabetes    dexamethasone 2 mg oral tablet  -- 1 tab(s) by mouth once a day x 30 days   -- Indication: For Pain due to neoplasm    HYDROmorphone 2 mg oral tablet  -- 1 tab(s) by mouth every 6 hours MDD:4  -- Indication: For Pain due to neoplasm    HYDROmorphone 2 mg oral tablet  -- 1 tab(s) by mouth every 4 hours, As needed, Breakthrough pain MDD:6  -- Indication: For Pain due to neoplasm    methadone 10 mg oral tablet  -- 1 tab(s) by mouth 3 times a day  three times a day 6am, 2pm and 10pm MDD:3  -- Indication: For Pain due to neoplasm    LORazepam 1 mg oral tablet  -- 1 tab(s) by mouth every 8 hours, As needed, Anxiety MDD:3  -- Indication: For Anxiety    NovoLOG Mix 70/30 FlexPen subcutaneous suspension  -- 20 unit(s) subcutaneous once a day with breakfast  -- Check with your doctor before becoming pregnant.  Do not drink alcoholic beverages when taking this medication.  It is very important that you take or use this exactly as directed.  Do not skip doses or discontinue unless directed by your doctor.  Keep in refrigerator.  Do not freeze.  Obtain medical advice before taking any non-prescription drugs as some may affect the action of this medication.  This drug may impair the ability to drive or operate machinery.  Use care until you become familiar with its effects.    -- Indication: For Diabetes    NovoLOG Mix 70/30 FlexPen subcutaneous suspension  -- 15 unit(s) subcutaneous once a day with dinner  -- Check with your doctor before becoming pregnant.  Do not drink alcoholic beverages when taking this medication.  It is very important that you take or use this exactly as directed.  Do not skip doses or discontinue unless directed by your doctor.  Keep in refrigerator.  Do not freeze.  Obtain medical advice before taking any non-prescription drugs as some may affect the action of this medication.  This drug may impair the ability to drive or operate machinery.  Use care until you become familiar with its effects.    -- Indication: For Diabetes    naloxone 4 mg/0.1 mL nasal spray  -- 4 mg into nose daily PRN narcotic overdose may repeat every 2 to 3 minutes in alternating nostrils until medical assistance becomes available  -- For the nose.    -- Indication: For Narcotic overdose    Zofran 4 mg oral tablet  -- 1 tab(s) by mouth 2 times a day, As needed, Nausea and/or Vomiting  -- Indication: For nausea    OLANZapine 5 mg oral tablet, disintegrating  -- 1 tab(s) by mouth every 12 hours, As needed, Agitation  -- Indication: For Agitation    lactulose 10 g/15 mL oral syrup  -- 30 milliliter(s) by mouth 2 times a day   -- Indication: For Cirrhosis    pantoprazole 40 mg oral delayed release tablet  -- 1 tab(s) by mouth once a day (before a meal)  -- Indication: For Acid reflux I will START or STAY ON the medications listed below when I get home from the hospital:    Insulin Pen needles   -- 1 each subcutaneous 2 times a day   -- Indication: For Diabetes    dexamethasone 2 mg oral tablet  -- 1 tab(s) by mouth once a day x 30 days   -- Indication: For Pain due to neoplasm    HYDROmorphone 2 mg oral tablet  -- 1 tab(s) by mouth every 6 hours MDD:4  -- Indication: For Pain due to neoplasm    HYDROmorphone 2 mg oral tablet  -- 1 tab(s) by mouth every 4 hours, As needed, Breakthrough pain MDD:6  -- Indication: For Pain due to neoplasm    methadone 10 mg oral tablet  -- 1 tab(s) by mouth 3 times a day  three times a day 6am, 2pm and 10pm MDD:3  -- Indication: For Pain due to neoplasm    LORazepam 1 mg oral tablet  -- 1 tab(s) by mouth every 8 hours, As needed, Anxiety MDD:3  -- Indication: For Anxiety    NovoLOG Mix 70/30 FlexPen subcutaneous suspension  -- 20 unit(s) subcutaneous once a day with breakfast  -- Check with your doctor before becoming pregnant.  Do not drink alcoholic beverages when taking this medication.  It is very important that you take or use this exactly as directed.  Do not skip doses or discontinue unless directed by your doctor.  Keep in refrigerator.  Do not freeze.  Obtain medical advice before taking any non-prescription drugs as some may affect the action of this medication.  This drug may impair the ability to drive or operate machinery.  Use care until you become familiar with its effects.    -- Indication: For Diabetes    NovoLOG Mix 70/30 FlexPen subcutaneous suspension  -- 15 unit(s) subcutaneous once a day with dinner  -- Check with your doctor before becoming pregnant.  Do not drink alcoholic beverages when taking this medication.  It is very important that you take or use this exactly as directed.  Do not skip doses or discontinue unless directed by your doctor.  Keep in refrigerator.  Do not freeze.  Obtain medical advice before taking any non-prescription drugs as some may affect the action of this medication.  This drug may impair the ability to drive or operate machinery.  Use care until you become familiar with its effects.    -- Indication: For Diabetes    NovoLOG Mix 70/30 FlexPen subcutaneous suspension  -- 12 unit(s) subcutaneous once a day  with breakfast   -- Check with your doctor before becoming pregnant.  Do not drink alcoholic beverages when taking this medication.  It is very important that you take or use this exactly as directed.  Do not skip doses or discontinue unless directed by your doctor.  Keep in refrigerator.  Do not freeze.  Obtain medical advice before taking any non-prescription drugs as some may affect the action of this medication.  This drug may impair the ability to drive or operate machinery.  Use care until you become familiar with its effects.    -- Indication: For Diabetes    naloxone 4 mg/0.1 mL nasal spray  -- 4 mg into nose daily PRN narcotic overdose may repeat every 2 to 3 minutes in alternating nostrils until medical assistance becomes available  -- For the nose.    -- Indication: For Narcotic overdose    Zofran 4 mg oral tablet  -- 1 tab(s) by mouth 2 times a day, As needed, Nausea and/or Vomiting  -- Indication: For nausea    OLANZapine 5 mg oral tablet, disintegrating  -- 1 tab(s) by mouth every 12 hours, As needed, Agitation  -- Indication: For Agitation    lactulose 10 g/15 mL oral syrup  -- 30 milliliter(s) by mouth 2 times a day   -- Indication: For Cirrhosis    pantoprazole 40 mg oral delayed release tablet  -- 1 tab(s) by mouth once a day (before a meal)  -- Indication: For Acid reflux

## 2017-09-16 DIAGNOSIS — E11.65 TYPE 2 DIABETES MELLITUS WITH HYPERGLYCEMIA: ICD-10-CM

## 2017-09-16 DIAGNOSIS — E08.43 DIABETES MELLITUS DUE TO UNDERLYING CONDITION WITH DIABETIC AUTONOMIC (POLY)NEUROPATHY: ICD-10-CM

## 2017-09-16 DIAGNOSIS — I10 ESSENTIAL (PRIMARY) HYPERTENSION: ICD-10-CM

## 2017-09-16 LAB
ANION GAP SERPL CALC-SCNC: 12 MMOL/L — SIGNIFICANT CHANGE UP (ref 5–17)
ANION GAP SERPL CALC-SCNC: 15 MMOL/L — SIGNIFICANT CHANGE UP (ref 5–17)
BUN SERPL-MCNC: 15 MG/DL — SIGNIFICANT CHANGE UP (ref 7–23)
BUN SERPL-MCNC: 19 MG/DL — SIGNIFICANT CHANGE UP (ref 7–23)
CALCIUM SERPL-MCNC: 8.4 MG/DL — SIGNIFICANT CHANGE UP (ref 8.4–10.5)
CALCIUM SERPL-MCNC: 8.6 MG/DL — SIGNIFICANT CHANGE UP (ref 8.4–10.5)
CHLORIDE SERPL-SCNC: 95 MMOL/L — LOW (ref 96–108)
CHLORIDE SERPL-SCNC: 95 MMOL/L — LOW (ref 96–108)
CO2 SERPL-SCNC: 18 MMOL/L — LOW (ref 22–31)
CO2 SERPL-SCNC: 22 MMOL/L — SIGNIFICANT CHANGE UP (ref 22–31)
CREAT SERPL-MCNC: 0.67 MG/DL — SIGNIFICANT CHANGE UP (ref 0.5–1.3)
CREAT SERPL-MCNC: 0.75 MG/DL — SIGNIFICANT CHANGE UP (ref 0.5–1.3)
GLUCOSE SERPL-MCNC: 218 MG/DL — HIGH (ref 70–99)
GLUCOSE SERPL-MCNC: 251 MG/DL — HIGH (ref 70–99)
POTASSIUM SERPL-MCNC: 4.1 MMOL/L — SIGNIFICANT CHANGE UP (ref 3.5–5.3)
POTASSIUM SERPL-MCNC: 4.4 MMOL/L — SIGNIFICANT CHANGE UP (ref 3.5–5.3)
POTASSIUM SERPL-SCNC: 4.1 MMOL/L — SIGNIFICANT CHANGE UP (ref 3.5–5.3)
POTASSIUM SERPL-SCNC: 4.4 MMOL/L — SIGNIFICANT CHANGE UP (ref 3.5–5.3)
SODIUM SERPL-SCNC: 128 MMOL/L — LOW (ref 135–145)
SODIUM SERPL-SCNC: 129 MMOL/L — LOW (ref 135–145)

## 2017-09-16 PROCEDURE — 99223 1ST HOSP IP/OBS HIGH 75: CPT | Mod: GV

## 2017-09-16 RX ORDER — INSULIN LISPRO 100/ML
5 VIAL (ML) SUBCUTANEOUS
Qty: 0 | Refills: 0 | Status: DISCONTINUED | OUTPATIENT
Start: 2017-09-16 | End: 2017-09-17

## 2017-09-16 RX ORDER — INSULIN GLARGINE 100 [IU]/ML
12 INJECTION, SOLUTION SUBCUTANEOUS AT BEDTIME
Qty: 0 | Refills: 0 | Status: DISCONTINUED | OUTPATIENT
Start: 2017-09-16 | End: 2017-09-17

## 2017-09-16 RX ADMIN — HYDROMORPHONE HYDROCHLORIDE 1 MILLIGRAM(S): 2 INJECTION INTRAMUSCULAR; INTRAVENOUS; SUBCUTANEOUS at 14:22

## 2017-09-16 RX ADMIN — Medication 3: at 13:10

## 2017-09-16 RX ADMIN — HYDROMORPHONE HYDROCHLORIDE 1 MILLIGRAM(S): 2 INJECTION INTRAMUSCULAR; INTRAVENOUS; SUBCUTANEOUS at 08:19

## 2017-09-16 RX ADMIN — HYDROMORPHONE HYDROCHLORIDE 1 MILLIGRAM(S): 2 INJECTION INTRAMUSCULAR; INTRAVENOUS; SUBCUTANEOUS at 18:41

## 2017-09-16 RX ADMIN — INSULIN GLARGINE 12 UNIT(S): 100 INJECTION, SOLUTION SUBCUTANEOUS at 21:40

## 2017-09-16 RX ADMIN — Medication 4: at 21:40

## 2017-09-16 RX ADMIN — Medication 1 MILLIGRAM(S): at 08:03

## 2017-09-16 RX ADMIN — METHADONE HYDROCHLORIDE 5 MILLIGRAM(S): 40 TABLET ORAL at 13:43

## 2017-09-16 RX ADMIN — HYDROMORPHONE HYDROCHLORIDE 1 MILLIGRAM(S): 2 INJECTION INTRAMUSCULAR; INTRAVENOUS; SUBCUTANEOUS at 03:49

## 2017-09-16 RX ADMIN — HYDROMORPHONE HYDROCHLORIDE 1 MILLIGRAM(S): 2 INJECTION INTRAMUSCULAR; INTRAVENOUS; SUBCUTANEOUS at 14:40

## 2017-09-16 RX ADMIN — HYDROMORPHONE HYDROCHLORIDE 1 MILLIGRAM(S): 2 INJECTION INTRAMUSCULAR; INTRAVENOUS; SUBCUTANEOUS at 01:16

## 2017-09-16 RX ADMIN — HYDROMORPHONE HYDROCHLORIDE 1 MILLIGRAM(S): 2 INJECTION INTRAMUSCULAR; INTRAVENOUS; SUBCUTANEOUS at 03:19

## 2017-09-16 RX ADMIN — HYDROMORPHONE HYDROCHLORIDE 1 MILLIGRAM(S): 2 INJECTION INTRAMUSCULAR; INTRAVENOUS; SUBCUTANEOUS at 16:53

## 2017-09-16 RX ADMIN — Medication 2 MILLIGRAM(S): at 10:04

## 2017-09-16 RX ADMIN — Medication 3 UNIT(S): at 09:01

## 2017-09-16 RX ADMIN — Medication 5 UNIT(S): at 17:51

## 2017-09-16 RX ADMIN — Medication 1 MILLIGRAM(S): at 19:58

## 2017-09-16 RX ADMIN — HYDROMORPHONE HYDROCHLORIDE 1 MILLIGRAM(S): 2 INJECTION INTRAMUSCULAR; INTRAVENOUS; SUBCUTANEOUS at 06:48

## 2017-09-16 RX ADMIN — HYDROMORPHONE HYDROCHLORIDE 1 MILLIGRAM(S): 2 INJECTION INTRAMUSCULAR; INTRAVENOUS; SUBCUTANEOUS at 16:34

## 2017-09-16 RX ADMIN — HYDROMORPHONE HYDROCHLORIDE 1 MILLIGRAM(S): 2 INJECTION INTRAMUSCULAR; INTRAVENOUS; SUBCUTANEOUS at 21:50

## 2017-09-16 RX ADMIN — HYDROMORPHONE HYDROCHLORIDE 1 MILLIGRAM(S): 2 INJECTION INTRAMUSCULAR; INTRAVENOUS; SUBCUTANEOUS at 22:20

## 2017-09-16 RX ADMIN — METHADONE HYDROCHLORIDE 5 MILLIGRAM(S): 40 TABLET ORAL at 22:49

## 2017-09-16 RX ADMIN — Medication 5: at 17:51

## 2017-09-16 RX ADMIN — FAMOTIDINE 20 MILLIGRAM(S): 10 INJECTION INTRAVENOUS at 00:08

## 2017-09-16 RX ADMIN — Medication 5 UNIT(S): at 13:10

## 2017-09-16 RX ADMIN — Medication 1: at 09:01

## 2017-09-16 RX ADMIN — HYDROMORPHONE HYDROCHLORIDE 1 MILLIGRAM(S): 2 INJECTION INTRAMUSCULAR; INTRAVENOUS; SUBCUTANEOUS at 11:50

## 2017-09-16 RX ADMIN — METHADONE HYDROCHLORIDE 5 MILLIGRAM(S): 40 TABLET ORAL at 06:54

## 2017-09-16 RX ADMIN — HYDROMORPHONE HYDROCHLORIDE 1 MILLIGRAM(S): 2 INJECTION INTRAMUSCULAR; INTRAVENOUS; SUBCUTANEOUS at 06:18

## 2017-09-16 RX ADMIN — HYDROMORPHONE HYDROCHLORIDE 1 MILLIGRAM(S): 2 INJECTION INTRAMUSCULAR; INTRAVENOUS; SUBCUTANEOUS at 01:44

## 2017-09-16 RX ADMIN — HYDROMORPHONE HYDROCHLORIDE 1 MILLIGRAM(S): 2 INJECTION INTRAMUSCULAR; INTRAVENOUS; SUBCUTANEOUS at 11:30

## 2017-09-16 RX ADMIN — HYDROMORPHONE HYDROCHLORIDE 1 MILLIGRAM(S): 2 INJECTION INTRAMUSCULAR; INTRAVENOUS; SUBCUTANEOUS at 19:00

## 2017-09-16 RX ADMIN — Medication 2 MILLIGRAM(S): at 21:40

## 2017-09-16 RX ADMIN — HYDROMORPHONE HYDROCHLORIDE 1 MILLIGRAM(S): 2 INJECTION INTRAMUSCULAR; INTRAVENOUS; SUBCUTANEOUS at 08:35

## 2017-09-16 NOTE — CONSULT NOTE ADULT - SUBJECTIVE AND OBJECTIVE BOX
HPI:  62 y/o M w/ hx fo Type 2 DM x 5 years no endocrinologist. Complicated by neuropathy. Denies nephropathy or retinopathy. Has not seen optho this year. A1c 9.4%. At home takes Levemir 40 units at bedtime. Misses a dose on average 2x/week. Used to also be on metformin but stopped taking it because " the pill was too big". Now only takes metformin if he eats a high carb food. Checks glucose 1x/day at variable times with values usually in the 200's. Has rare hypoglycemia 60-70 when he does not eat. +symptoms with hypoglycemia of shaking. Nonadherent to carb consistent diet. Eats a lot of bread and carbs. No exercise. +polyuria, polydipsia, nausea, vomiting, weight loss. On decadron.   Also hx of liver CA, pancreatitis, Hep-C , HTN. Was at Hospice and found wandering in another patients room and smearing feces according to EMS. He complains of abdominal pain which he admits has been there for at least one month. He was on PCA pain pump at hospice and was found to be taking PO morphine in addition, which was not known to the staff at hospice. They suspect that he has overdosed on opiates causing delirium. He reports he was in another patients room because he was thirsty.       PAST MEDICAL & SURGICAL HISTORY:  Liver cancer  Weight loss, unintentional: 45 lbs in 1 yr  Type 2 diabetes mellitus: IDDM  Smoker  Chronic pain with drug dependence: On suboxone now  HTN (hypertension): no medications  Emphysema lung  Depression  Diabetic neuropathy  Chronic alcoholic pancreatitis  Hepatitis C  S/P inguinal hernia repair: left  S/P arthroscopy of right knee  S/P shoulder surgery: bilateral  History of cholecystectomy      FAMILY HISTORY:  Brother-CAD      Social History: +tobacco use. + former Etoh use    Outpatient Medications: Levemir 40 untis qhs  · 	oxyCODONE 15 mg oral tablet: 1 tab(s) orally 3 times a day  · 	temazepam:  orally   · 	morphine:  orally   · 	prochlorperazine:  orally   · 	dexamethasone:  orally   · 	ondansetron:  orally       MEDICATIONS  (STANDING):  insulin lispro (HumaLOG) corrective regimen sliding scale   SubCutaneous three times a day before meals  insulin lispro (HumaLOG) corrective regimen sliding scale   SubCutaneous at bedtime  dextrose 5%. 1000 milliLiter(s) (50 mL/Hr) IV Continuous <Continuous>  dextrose 50% Injectable 12.5 Gram(s) IV Push once  dextrose 50% Injectable 25 Gram(s) IV Push once  dextrose 50% Injectable 25 Gram(s) IV Push once  methadone    Tablet 5 milliGRAM(s) Oral <User Schedule>  dexamethasone     Tablet 2 milliGRAM(s) Oral two times a day  LORazepam     Tablet 1 milliGRAM(s) Oral two times a day  insulin glargine Injectable (LANTUS) 12 Unit(s) SubCutaneous at bedtime  insulin lispro Injectable (HumaLOG) 5 Unit(s) SubCutaneous three times a day before meals    MEDICATIONS  (PRN):  ondansetron    Tablet 4 milliGRAM(s) Oral two times a day PRN Nausea and/or Vomiting  dextrose Gel 1 Dose(s) Oral once PRN Blood Glucose LESS THAN 70 milliGRAM(s)/deciliter  glucagon  Injectable 1 milliGRAM(s) IntraMuscular once PRN Glucose LESS THAN 70 milligrams/deciliter  naloxone Injectable 0.1 milliGRAM(s) IV Push every 3 minutes PRN Sedation or respiratory depression RR < 10  HYDROmorphone  Injectable 1 milliGRAM(s) IV Push every 2 hours PRN Breakthrough pain  LORazepam     Tablet 1 milliGRAM(s) Oral every 8 hours PRN Anxiety      Allergies    No Known Allergies    Intolerances      Review of Systems:  Constitutional: No fever, +weight loss  Eyes: No blurry vision  Neuro: No headache, +neuropathy  HEENT: No throat pain  Cardiovascular: No chest pain  Respiratory: No SOB  GI: +nausea and vomiting; +abdominal pain  : +polyuria  Skin: no rash  Psych: +occasional depression  Endocrine: + polydipsia, No heat or cold intolerance, rest as noted in HPI  Hem/lymph: no swelling    All other review of systems negative      PHYSICAL EXAM:  VITALS: T(C): 36.8 (09-16-17 @ 12:22)  T(F): 98.2 (09-16-17 @ 12:22), Max: 98.9 (09-15-17 @ 19:15)  HR: 62 (09-16-17 @ 12:22) (56 - 62)  BP: 109/56 (09-16-17 @ 12:22) (109/56 - 153/70)  RR:  (16 - 18)  SpO2:  (96% - 98%)  Wt(kg): --  GENERAL: NAD at this time  EYES: No proptosis, EOMI  HEENT:  Atraumatic, Normocephalic,   THYROID: Normal size, no palpable nodules  RESPIRATORY: Clear to auscultation bilaterally, full excursion, non-labored  CARDIOVASCULAR: Regular rhythm; No murmurs; no peripheral edema  GI: Soft, +tenderness to palpation without guarding or rebound, non distended, normal bowel sounds  SKIN: Dry skin on feet. No rashes or lesions  MUSCULOSKELETAL: normal strength  NEURO: follows commands, no tremor, normal reflexes  PSYCH: Alert and oriented x 3, normal affect, normal mood  CUSHING'S SIGNS: no striae    CAPILLARY BLOOD GLUCOSE  257 (09-16 @ 11:55) H3 +3  199 (09-16 @ 07:55) H 3 +1  222 (09-15 @ 21:49) L10  272 (09-15 @ 16:47) H 3+3  288 (09-15 @ 11:44) H3  254 (09-15 @ 07:44) H3  231 (09-14 @ 22:18)  207 (09-14 @ 17:33)  211 (09-14 @ 12:15)  260 (09-14 @ 08:53)  233 (09-13 @ 21:45)  284 (09-13 @ 20:15)  411 (09-13 @ 14:48)                            12.5   6.66  )-----------( 133      ( 15 Sep 2017 09:03 )             36.4       09-16    129<L>  |  95<L>  |  19  ----------------------------<  218<H>  4.1   |  22  |  0.67    EGFR if : 121  EGFR if non : 104    Ca    8.4      09-16      Thyroid Function Tests:      Hemoglobin A1C, Whole Blood: 9.4 % <H> [4.0 - 5.6] (09-14-17 @ 07:44)        Radiology:

## 2017-09-16 NOTE — PROGRESS NOTE ADULT - SUBJECTIVE AND OBJECTIVE BOX
Patient is a 61y old  Male who presents with a chief complaint of Lethargy (15 Sep 2017 16:58)      SUBJECTIVE / OVERNIGHT EVENTS:  awake and alert, abdominal pain as before.  Review of Systems:   CONSTITUTIONAL: No fever, weight loss, or fatigue  EYES: No eye pain, visual disturbances, or discharge  ENMT:  No difficulty hearing, tinnitus, vertigo; No sinus or throat pain  NECK: No pain or stiffness  BREASTS: No pain, masses, or nipple discharge  RESPIRATORY: No cough, wheezing, chills or hemoptysis; No shortness of breath  CARDIOVASCULAR: No chest pain, palpitations, dizziness, or leg swelling  GASTROINTESTINAL: .  GENITOURINARY: No dysuria, frequency, hematuria, or incontinence  NEUROLOGICAL: No headaches, memory loss, loss of strength, numbness, or tremors  SKIN: No itching, burning, rashes, or lesions   LYMPH NODES: No enlarged glands  ENDOCRINE: No heat or cold intolerance; No hair loss  MUSCULOSKELETAL: No joint pain or swelling; No muscle, back, or extremity pain  PSYCHIATRIC: No depression, anxiety, mood swings, or difficulty sleeping  HEME/LYMPH: No easy bruising, or bleeding gums  ALLERY AND IMMUNOLOGIC: No hives or eczema    MEDICATIONS  (STANDING):  insulin lispro (HumaLOG) corrective regimen sliding scale   SubCutaneous three times a day before meals  insulin lispro (HumaLOG) corrective regimen sliding scale   SubCutaneous at bedtime  dextrose 5%. 1000 milliLiter(s) (50 mL/Hr) IV Continuous <Continuous>  dextrose 50% Injectable 12.5 Gram(s) IV Push once  dextrose 50% Injectable 25 Gram(s) IV Push once  dextrose 50% Injectable 25 Gram(s) IV Push once  methadone    Tablet 5 milliGRAM(s) Oral <User Schedule>  dexamethasone     Tablet 2 milliGRAM(s) Oral two times a day  LORazepam     Tablet 1 milliGRAM(s) Oral two times a day  insulin glargine Injectable (LANTUS) 12 Unit(s) SubCutaneous at bedtime  insulin lispro Injectable (HumaLOG) 5 Unit(s) SubCutaneous three times a day before meals    MEDICATIONS  (PRN):  ondansetron    Tablet 4 milliGRAM(s) Oral two times a day PRN Nausea and/or Vomiting  dextrose Gel 1 Dose(s) Oral once PRN Blood Glucose LESS THAN 70 milliGRAM(s)/deciliter  glucagon  Injectable 1 milliGRAM(s) IntraMuscular once PRN Glucose LESS THAN 70 milligrams/deciliter  naloxone Injectable 0.1 milliGRAM(s) IV Push every 3 minutes PRN Sedation or respiratory depression RR < 10  HYDROmorphone  Injectable 1 milliGRAM(s) IV Push every 2 hours PRN Breakthrough pain  LORazepam     Tablet 1 milliGRAM(s) Oral every 8 hours PRN Anxiety      PHYSICAL EXAM:  Vital Signs Last 24 Hrs  T(C): 36.8 (16 Sep 2017 12:22), Max: 37.2 (15 Sep 2017 19:15)  T(F): 98.2 (16 Sep 2017 12:22), Max: 98.9 (15 Sep 2017 19:15)  HR: 62 (16 Sep 2017 12:22) (56 - 62)  BP: 109/56 (16 Sep 2017 12:22) (109/56 - 153/70)  BP(mean): --  RR: 18 (16 Sep 2017 12:22) (16 - 18)  SpO2: 97% (16 Sep 2017 12:22) (96% - 98%)  I&O's Summary    15 Sep 2017 07:01  -  16 Sep 2017 07:00  --------------------------------------------------------  IN: 2170 mL / OUT: 0 mL / NET: 2170 mL    16 Sep 2017 07:01  -  16 Sep 2017 16:16  --------------------------------------------------------  IN: 1260 mL / OUT: 0 mL / NET: 1260 mL      GENERAL: NAD, well-developed  HEAD:  Atraumatic, Normocephalic  EYES: EOMI, PERRLA, conjunctiva and sclera clear  NECK: Supple, No JVD  CHEST/LUNG: Clear to auscultation bilaterally; No wheeze  HEART: Regular rate and rhythm; No murmurs, rubs, or gallops  ABDOMEN: Soft, Nontender, Nondistended; Bowel sounds present  EXTREMITIES:  2+ Peripheral Pulses, No clubbing, cyanosis, or edema  PSYCH: AAOx3  NEUROLOGY: non-focal  SKIN: No rashes or lesions    LABS:  CAPILLARY BLOOD GLUCOSE  257 (16 Sep 2017 11:55)  199 (16 Sep 2017 07:55)  222 (15 Sep 2017 21:49)  272 (15 Sep 2017 16:47)                              12.5   6.66  )-----------( 133      ( 15 Sep 2017 09:03 )             36.4     09-16    129<L>  |  95<L>  |  19  ----------------------------<  218<H>  4.1   |  22  |  0.67    Ca    8.4      16 Sep 2017 08:48                RADIOLOGY & ADDITIONAL TESTS:    Imaging Personally Reviewed:    Consultant(s) Notes Reviewed:      Care Discussed with Consultants/Other Providers:

## 2017-09-16 NOTE — CONSULT NOTE ADULT - ASSESSMENT
62 y/o M w/ uncontrolled Type 2 DM w/ A1c 9.4% w/ hyperglycemia exacerbated by steroids w/ HTN and diabetic neuropathy (high risk patient with hgih level decision-making).

## 2017-09-16 NOTE — CONSULT NOTE ADULT - PROBLEM SELECTOR RECOMMENDATION 2
Recent BP improved compared to admission. Off antihypertensives at this time. Goal BP <140/90, continue to monitor.

## 2017-09-16 NOTE — CONSULT NOTE ADULT - PROBLEM SELECTOR RECOMMENDATION 9
Diabetes Education and Nutrition Eval  No need for tight glycemic control as patient on hospice  Increase Lantus to 12 units qhs  Increase Humalog to 5 units qac  Low correction scale qac + bedtime  Goal glucose 100-200  Outpt. endo follow-up  Outpt. optho, podiatry, micro/cr  Plan to d/c on basal +orals. Avoid GLP-1 and DPP-4 given hx of pancreatitis. Pt. occasionally takes metformin, but has been nonadherent as the pill is too big, can consider low dose sulfonylurea or prandin with meals. Needs education on diet.

## 2017-09-17 DIAGNOSIS — E87.1 HYPO-OSMOLALITY AND HYPONATREMIA: ICD-10-CM

## 2017-09-17 PROCEDURE — 99232 SBSQ HOSP IP/OBS MODERATE 35: CPT | Mod: GV

## 2017-09-17 RX ORDER — INSULIN GLARGINE 100 [IU]/ML
16 INJECTION, SOLUTION SUBCUTANEOUS AT BEDTIME
Qty: 0 | Refills: 0 | Status: DISCONTINUED | OUTPATIENT
Start: 2017-09-17 | End: 2017-09-18

## 2017-09-17 RX ORDER — INSULIN LISPRO 100/ML
8 VIAL (ML) SUBCUTANEOUS
Qty: 0 | Refills: 0 | Status: DISCONTINUED | OUTPATIENT
Start: 2017-09-17 | End: 2017-09-18

## 2017-09-17 RX ORDER — PANTOPRAZOLE SODIUM 20 MG/1
40 TABLET, DELAYED RELEASE ORAL
Qty: 0 | Refills: 0 | Status: DISCONTINUED | OUTPATIENT
Start: 2017-09-17 | End: 2017-09-28

## 2017-09-17 RX ADMIN — HYDROMORPHONE HYDROCHLORIDE 1 MILLIGRAM(S): 2 INJECTION INTRAMUSCULAR; INTRAVENOUS; SUBCUTANEOUS at 00:32

## 2017-09-17 RX ADMIN — HYDROMORPHONE HYDROCHLORIDE 1 MILLIGRAM(S): 2 INJECTION INTRAMUSCULAR; INTRAVENOUS; SUBCUTANEOUS at 04:28

## 2017-09-17 RX ADMIN — Medication 2: at 09:04

## 2017-09-17 RX ADMIN — HYDROMORPHONE HYDROCHLORIDE 1 MILLIGRAM(S): 2 INJECTION INTRAMUSCULAR; INTRAVENOUS; SUBCUTANEOUS at 08:00

## 2017-09-17 RX ADMIN — Medication 1 MILLIGRAM(S): at 06:45

## 2017-09-17 RX ADMIN — HYDROMORPHONE HYDROCHLORIDE 1 MILLIGRAM(S): 2 INJECTION INTRAMUSCULAR; INTRAVENOUS; SUBCUTANEOUS at 17:40

## 2017-09-17 RX ADMIN — HYDROMORPHONE HYDROCHLORIDE 1 MILLIGRAM(S): 2 INJECTION INTRAMUSCULAR; INTRAVENOUS; SUBCUTANEOUS at 10:17

## 2017-09-17 RX ADMIN — HYDROMORPHONE HYDROCHLORIDE 1 MILLIGRAM(S): 2 INJECTION INTRAMUSCULAR; INTRAVENOUS; SUBCUTANEOUS at 10:00

## 2017-09-17 RX ADMIN — METHADONE HYDROCHLORIDE 5 MILLIGRAM(S): 40 TABLET ORAL at 05:24

## 2017-09-17 RX ADMIN — Medication 3: at 13:05

## 2017-09-17 RX ADMIN — Medication 2: at 17:55

## 2017-09-17 RX ADMIN — Medication 2 MILLIGRAM(S): at 09:43

## 2017-09-17 RX ADMIN — HYDROMORPHONE HYDROCHLORIDE 1 MILLIGRAM(S): 2 INJECTION INTRAMUSCULAR; INTRAVENOUS; SUBCUTANEOUS at 03:58

## 2017-09-17 RX ADMIN — HYDROMORPHONE HYDROCHLORIDE 1 MILLIGRAM(S): 2 INJECTION INTRAMUSCULAR; INTRAVENOUS; SUBCUTANEOUS at 23:58

## 2017-09-17 RX ADMIN — HYDROMORPHONE HYDROCHLORIDE 1 MILLIGRAM(S): 2 INJECTION INTRAMUSCULAR; INTRAVENOUS; SUBCUTANEOUS at 07:42

## 2017-09-17 RX ADMIN — HYDROMORPHONE HYDROCHLORIDE 1 MILLIGRAM(S): 2 INJECTION INTRAMUSCULAR; INTRAVENOUS; SUBCUTANEOUS at 20:55

## 2017-09-17 RX ADMIN — HYDROMORPHONE HYDROCHLORIDE 1 MILLIGRAM(S): 2 INJECTION INTRAMUSCULAR; INTRAVENOUS; SUBCUTANEOUS at 23:28

## 2017-09-17 RX ADMIN — METHADONE HYDROCHLORIDE 5 MILLIGRAM(S): 40 TABLET ORAL at 22:11

## 2017-09-17 RX ADMIN — METHADONE HYDROCHLORIDE 5 MILLIGRAM(S): 40 TABLET ORAL at 13:59

## 2017-09-17 RX ADMIN — Medication 1 MILLIGRAM(S): at 17:22

## 2017-09-17 RX ADMIN — HYDROMORPHONE HYDROCHLORIDE 1 MILLIGRAM(S): 2 INJECTION INTRAMUSCULAR; INTRAVENOUS; SUBCUTANEOUS at 01:00

## 2017-09-17 RX ADMIN — HYDROMORPHONE HYDROCHLORIDE 1 MILLIGRAM(S): 2 INJECTION INTRAMUSCULAR; INTRAVENOUS; SUBCUTANEOUS at 15:36

## 2017-09-17 RX ADMIN — Medication 2 MILLIGRAM(S): at 22:11

## 2017-09-17 RX ADMIN — Medication 8 UNIT(S): at 17:55

## 2017-09-17 RX ADMIN — PANTOPRAZOLE SODIUM 40 MILLIGRAM(S): 20 TABLET, DELAYED RELEASE ORAL at 17:22

## 2017-09-17 RX ADMIN — HYDROMORPHONE HYDROCHLORIDE 1 MILLIGRAM(S): 2 INJECTION INTRAMUSCULAR; INTRAVENOUS; SUBCUTANEOUS at 15:54

## 2017-09-17 RX ADMIN — HYDROMORPHONE HYDROCHLORIDE 1 MILLIGRAM(S): 2 INJECTION INTRAMUSCULAR; INTRAVENOUS; SUBCUTANEOUS at 21:20

## 2017-09-17 RX ADMIN — INSULIN GLARGINE 16 UNIT(S): 100 INJECTION, SOLUTION SUBCUTANEOUS at 22:11

## 2017-09-17 RX ADMIN — Medication 5 UNIT(S): at 09:04

## 2017-09-17 RX ADMIN — HYDROMORPHONE HYDROCHLORIDE 1 MILLIGRAM(S): 2 INJECTION INTRAMUSCULAR; INTRAVENOUS; SUBCUTANEOUS at 18:00

## 2017-09-17 RX ADMIN — Medication 8 UNIT(S): at 13:05

## 2017-09-17 RX ADMIN — HYDROMORPHONE HYDROCHLORIDE 1 MILLIGRAM(S): 2 INJECTION INTRAMUSCULAR; INTRAVENOUS; SUBCUTANEOUS at 13:10

## 2017-09-17 RX ADMIN — HYDROMORPHONE HYDROCHLORIDE 1 MILLIGRAM(S): 2 INJECTION INTRAMUSCULAR; INTRAVENOUS; SUBCUTANEOUS at 12:51

## 2017-09-17 NOTE — PROGRESS NOTE ADULT - SUBJECTIVE AND OBJECTIVE BOX
Chief Complaint: Evaluating this 62 y/o for uncontrolled Type 2 DM w/ hyperglycemia exacerbated by steroids      Interval History: +epigastric abdominal pain associated with food intake. +po intake. Denies chest pain or shortness of breath. No vomiting.     MEDICATIONS  (STANDING):  insulin lispro (HumaLOG) corrective regimen sliding scale   SubCutaneous three times a day before meals  insulin lispro (HumaLOG) corrective regimen sliding scale   SubCutaneous at bedtime  dextrose 5%. 1000 milliLiter(s) (50 mL/Hr) IV Continuous <Continuous>  dextrose 50% Injectable 12.5 Gram(s) IV Push once  dextrose 50% Injectable 25 Gram(s) IV Push once  dextrose 50% Injectable 25 Gram(s) IV Push once  methadone    Tablet 5 milliGRAM(s) Oral <User Schedule>  dexamethasone     Tablet 2 milliGRAM(s) Oral two times a day  LORazepam     Tablet 1 milliGRAM(s) Oral two times a day  insulin glargine Injectable (LANTUS) 16 Unit(s) SubCutaneous at bedtime  insulin lispro Injectable (HumaLOG) 8 Unit(s) SubCutaneous three times a day before meals    MEDICATIONS  (PRN):  ondansetron    Tablet 4 milliGRAM(s) Oral two times a day PRN Nausea and/or Vomiting  dextrose Gel 1 Dose(s) Oral once PRN Blood Glucose LESS THAN 70 milliGRAM(s)/deciliter  glucagon  Injectable 1 milliGRAM(s) IntraMuscular once PRN Glucose LESS THAN 70 milligrams/deciliter  naloxone Injectable 0.1 milliGRAM(s) IV Push every 3 minutes PRN Sedation or respiratory depression RR < 10  HYDROmorphone  Injectable 1 milliGRAM(s) IV Push every 2 hours PRN Breakthrough pain  LORazepam     Tablet 1 milliGRAM(s) Oral every 8 hours PRN Anxiety      Allergies    No Known Allergies    Intolerances      Review of Systems:  Constitutional: No fever  Eyes: No blurry vision  Cardiovascular: No chest pain  Respiratory: No SOB  GI: + abdominal pain, No nausea, No vomiting  Endocrine: as noted in HPI    All other negative      PHYSICAL EXAM:  VITALS: T(C): 36.9 (09-17-17 @ 11:01)  T(F): 98.4 (09-17-17 @ 11:01), Max: 98.4 (09-17-17 @ 11:01)  HR: 67 (09-17-17 @ 11:01) (55 - 67)  BP: 117/75 (09-17-17 @ 11:01) (114/56 - 134/73)  RR:  (18 - 18)  SpO2:  (96% - 99%)  Wt(kg): --  GENERAL: NAD at this time  EYES: EOMI, No proptosis  HEENT:  Atraumatic, Normocephalic,   RESPIRATORY: full excursion, non labored  CARDIOVASCULAR: Regular rhythm; normal S1/S2, no peripheral edema  GI: Soft, mild tenderness to palpation in epigastrum without guarding, mildly distended, normal bowel sounds  SKIN: Warm and dry  PSYCH: normal affect, normal mood        CAPILLARY BLOOD GLUCOSE  283 (09-17 @ 11:40)  244 (09-17 @ 07:44)  308 (09-16 @ 20:38)  382 (09-16 @ 16:50)  257 (09-16 @ 11:55)  199 (09-16 @ 07:55)  222 (09-15 @ 21:49)  272 (09-15 @ 16:47)  288 (09-15 @ 11:44)  254 (09-15 @ 07:44)  231 (09-14 @ 22:18)  207 (09-14 @ 17:33)        09-16    129<L>  |  95<L>  |  19  ----------------------------<  218<H>  4.1   |  22  |  0.67    EGFR if : 121  EGFR if non : 104    Ca    8.4      09-16          Thyroid Function Tests:      Hemoglobin A1C, Whole Blood: 9.4 % <H> [4.0 - 5.6] (09-14-17 @ 07:44)

## 2017-09-17 NOTE — PROGRESS NOTE ADULT - ASSESSMENT
60 y/o M w/ uncontrolled Type 2 DM w/ hyperglycemia exacerbated by steroids admitted with AMS from hospice

## 2017-09-17 NOTE — PROGRESS NOTE ADULT - PROBLEM SELECTOR PLAN 1
Goal glucose 100-200. No need for tight glycemic control given patient on hospice.  Increase Lantus to 16 units qhs and Humalog to 8 units with meals.  Plan to d/c on Basal + optimized oral regimen.

## 2017-09-17 NOTE — PROGRESS NOTE ADULT - SUBJECTIVE AND OBJECTIVE BOX
Patient is a 61y old  Male who presents with a chief complaint of Lethargy (15 Sep 2017 16:58)      SUBJECTIVE / OVERNIGHT EVENTS:  No new symptoms  Review of Systems:   CONSTITUTIONAL: No fever, weight loss, or fatigue  EYES: No eye pain, visual disturbances, or discharge  ENMT:  No difficulty hearing, tinnitus, vertigo; No sinus or throat pain  NECK: No pain or stiffness  BREASTS: No pain, masses, or nipple discharge  RESPIRATORY: No cough, wheezing, chills or hemoptysis; No shortness of breath  CARDIOVASCULAR: No chest pain, palpitations, dizziness, or leg swelling  GASTROINTESTINAL:  No nausea, vomiting, or hematemesis; No diarrhea or constipation. No melena or hematochezia.  GENITOURINARY: No dysuria, frequency, hematuria, or incontinence  NEUROLOGICAL: No headaches, memory loss, loss of strength, numbness, or tremors  SKIN: No itching, burning, rashes, or lesions   LYMPH NODES: No enlarged glands  ENDOCRINE: No heat or cold intolerance; No hair loss  MUSCULOSKELETAL: No joint pain or swelling; No muscle, back, or extremity pain  PSYCHIATRIC: No depression, anxiety, mood swings, or difficulty sleeping  HEME/LYMPH: No easy bruising, or bleeding gums  ALLERY AND IMMUNOLOGIC: No hives or eczema    MEDICATIONS  (STANDING):  insulin lispro (HumaLOG) corrective regimen sliding scale   SubCutaneous three times a day before meals  insulin lispro (HumaLOG) corrective regimen sliding scale   SubCutaneous at bedtime  dextrose 5%. 1000 milliLiter(s) (50 mL/Hr) IV Continuous <Continuous>  dextrose 50% Injectable 12.5 Gram(s) IV Push once  dextrose 50% Injectable 25 Gram(s) IV Push once  dextrose 50% Injectable 25 Gram(s) IV Push once  methadone    Tablet 5 milliGRAM(s) Oral <User Schedule>  dexamethasone     Tablet 2 milliGRAM(s) Oral two times a day  LORazepam     Tablet 1 milliGRAM(s) Oral two times a day  insulin glargine Injectable (LANTUS) 16 Unit(s) SubCutaneous at bedtime  insulin lispro Injectable (HumaLOG) 8 Unit(s) SubCutaneous three times a day before meals    MEDICATIONS  (PRN):  ondansetron    Tablet 4 milliGRAM(s) Oral two times a day PRN Nausea and/or Vomiting  dextrose Gel 1 Dose(s) Oral once PRN Blood Glucose LESS THAN 70 milliGRAM(s)/deciliter  glucagon  Injectable 1 milliGRAM(s) IntraMuscular once PRN Glucose LESS THAN 70 milligrams/deciliter  naloxone Injectable 0.1 milliGRAM(s) IV Push every 3 minutes PRN Sedation or respiratory depression RR < 10  HYDROmorphone  Injectable 1 milliGRAM(s) IV Push every 2 hours PRN Breakthrough pain  LORazepam     Tablet 1 milliGRAM(s) Oral every 8 hours PRN Anxiety      PHYSICAL EXAM:  Vital Signs Last 24 Hrs  T(C): 36.9 (17 Sep 2017 11:01), Max: 36.9 (17 Sep 2017 11:01)  T(F): 98.4 (17 Sep 2017 11:01), Max: 98.4 (17 Sep 2017 11:01)  HR: 67 (17 Sep 2017 11:01) (55 - 67)  BP: 117/75 (17 Sep 2017 11:01) (114/56 - 134/73)  BP(mean): --  RR: 18 (17 Sep 2017 11:01) (18 - 18)  SpO2: 99% (17 Sep 2017 11:01) (96% - 99%)  I&O's Summary    16 Sep 2017 07:01  -  17 Sep 2017 07:00  --------------------------------------------------------  IN: 1620 mL / OUT: 0 mL / NET: 1620 mL    17 Sep 2017 07:01  -  17 Sep 2017 12:59  --------------------------------------------------------  IN: 370 mL / OUT: 0 mL / NET: 370 mL      GENERAL: NAD, well-developed  HEAD:  Atraumatic, Normocephalic  EYES: EOMI, PERRLA, conjunctiva and sclera clear  NECK: Supple, No JVD  CHEST/LUNG: Clear to auscultation bilaterally; No wheeze  HEART: Regular rate and rhythm; No murmurs, rubs, or gallops  ABDOMEN: Soft, Nontender, Nondistended; Bowel sounds present  EXTREMITIES:  2+ Peripheral Pulses, No clubbing, cyanosis, or edema  PSYCH: AAOx3  NEUROLOGY: non-focal  SKIN: No rashes or lesions    LABS:  CAPILLARY BLOOD GLUCOSE  283 (17 Sep 2017 11:40)  244 (17 Sep 2017 07:44)  308 (16 Sep 2017 20:38)  382 (16 Sep 2017 16:50)          09-16    129<L>  |  95<L>  |  19  ----------------------------<  218<H>  4.1   |  22  |  0.67    Ca    8.4      16 Sep 2017 08:48                RADIOLOGY & ADDITIONAL TESTS:    Imaging Personally Reviewed:    Consultant(s) Notes Reviewed:      Care Discussed with Consultants/Other Providers:

## 2017-09-18 LAB
ANION GAP SERPL CALC-SCNC: 10 MMOL/L — SIGNIFICANT CHANGE UP (ref 5–17)
BUN SERPL-MCNC: 28 MG/DL — HIGH (ref 7–23)
CALCIUM SERPL-MCNC: 8.6 MG/DL — SIGNIFICANT CHANGE UP (ref 8.4–10.5)
CHLORIDE SERPL-SCNC: 95 MMOL/L — LOW (ref 96–108)
CO2 SERPL-SCNC: 24 MMOL/L — SIGNIFICANT CHANGE UP (ref 22–31)
CREAT SERPL-MCNC: 0.73 MG/DL — SIGNIFICANT CHANGE UP (ref 0.5–1.3)
GLUCOSE SERPL-MCNC: 254 MG/DL — HIGH (ref 70–99)
HCT VFR BLD CALC: 32.6 % — LOW (ref 39–50)
HGB BLD-MCNC: 10.8 G/DL — LOW (ref 13–17)
MCHC RBC-ENTMCNC: 31.2 PG — SIGNIFICANT CHANGE UP (ref 27–34)
MCHC RBC-ENTMCNC: 33.1 GM/DL — SIGNIFICANT CHANGE UP (ref 32–36)
MCV RBC AUTO: 94.2 FL — SIGNIFICANT CHANGE UP (ref 80–100)
PLATELET # BLD AUTO: 126 K/UL — LOW (ref 150–400)
POTASSIUM SERPL-MCNC: 4.3 MMOL/L — SIGNIFICANT CHANGE UP (ref 3.5–5.3)
POTASSIUM SERPL-SCNC: 4.3 MMOL/L — SIGNIFICANT CHANGE UP (ref 3.5–5.3)
RBC # BLD: 3.46 M/UL — LOW (ref 4.2–5.8)
RBC # FLD: 15.7 % — HIGH (ref 10.3–14.5)
SODIUM SERPL-SCNC: 129 MMOL/L — LOW (ref 135–145)
WBC # BLD: 5.35 K/UL — SIGNIFICANT CHANGE UP (ref 3.8–10.5)
WBC # FLD AUTO: 5.35 K/UL — SIGNIFICANT CHANGE UP (ref 3.8–10.5)

## 2017-09-18 PROCEDURE — 99232 SBSQ HOSP IP/OBS MODERATE 35: CPT

## 2017-09-18 PROCEDURE — 99233 SBSQ HOSP IP/OBS HIGH 50: CPT | Mod: GV

## 2017-09-18 RX ORDER — HYDROMORPHONE HYDROCHLORIDE 2 MG/ML
1 INJECTION INTRAMUSCULAR; INTRAVENOUS; SUBCUTANEOUS EVERY 6 HOURS
Qty: 0 | Refills: 0 | Status: DISCONTINUED | OUTPATIENT
Start: 2017-09-18 | End: 2017-09-22

## 2017-09-18 RX ORDER — INSULIN GLARGINE 100 [IU]/ML
18 INJECTION, SOLUTION SUBCUTANEOUS AT BEDTIME
Qty: 0 | Refills: 0 | Status: DISCONTINUED | OUTPATIENT
Start: 2017-09-18 | End: 2017-09-19

## 2017-09-18 RX ORDER — METHADONE HYDROCHLORIDE 40 MG/1
10 TABLET ORAL
Qty: 0 | Refills: 0 | Status: DISCONTINUED | OUTPATIENT
Start: 2017-09-18 | End: 2017-09-25

## 2017-09-18 RX ORDER — INSULIN LISPRO 100/ML
10 VIAL (ML) SUBCUTANEOUS
Qty: 0 | Refills: 0 | Status: DISCONTINUED | OUTPATIENT
Start: 2017-09-18 | End: 2017-09-19

## 2017-09-18 RX ORDER — DEXAMETHASONE 0.5 MG/5ML
4 ELIXIR ORAL
Qty: 0 | Refills: 0 | Status: DISCONTINUED | OUTPATIENT
Start: 2017-09-18 | End: 2017-09-22

## 2017-09-18 RX ADMIN — Medication 1 MILLIGRAM(S): at 17:51

## 2017-09-18 RX ADMIN — INSULIN GLARGINE 18 UNIT(S): 100 INJECTION, SOLUTION SUBCUTANEOUS at 22:14

## 2017-09-18 RX ADMIN — HYDROMORPHONE HYDROCHLORIDE 1 MILLIGRAM(S): 2 INJECTION INTRAMUSCULAR; INTRAVENOUS; SUBCUTANEOUS at 03:07

## 2017-09-18 RX ADMIN — HYDROMORPHONE HYDROCHLORIDE 1 MILLIGRAM(S): 2 INJECTION INTRAMUSCULAR; INTRAVENOUS; SUBCUTANEOUS at 03:37

## 2017-09-18 RX ADMIN — METHADONE HYDROCHLORIDE 5 MILLIGRAM(S): 40 TABLET ORAL at 13:43

## 2017-09-18 RX ADMIN — Medication 8 UNIT(S): at 13:38

## 2017-09-18 RX ADMIN — HYDROMORPHONE HYDROCHLORIDE 1 MILLIGRAM(S): 2 INJECTION INTRAMUSCULAR; INTRAVENOUS; SUBCUTANEOUS at 18:55

## 2017-09-18 RX ADMIN — Medication 2: at 22:14

## 2017-09-18 RX ADMIN — PANTOPRAZOLE SODIUM 40 MILLIGRAM(S): 20 TABLET, DELAYED RELEASE ORAL at 05:48

## 2017-09-18 RX ADMIN — HYDROMORPHONE HYDROCHLORIDE 1 MILLIGRAM(S): 2 INJECTION INTRAMUSCULAR; INTRAVENOUS; SUBCUTANEOUS at 19:15

## 2017-09-18 RX ADMIN — Medication 4 MILLIGRAM(S): at 17:51

## 2017-09-18 RX ADMIN — METHADONE HYDROCHLORIDE 10 MILLIGRAM(S): 40 TABLET ORAL at 22:14

## 2017-09-18 RX ADMIN — Medication 1 MILLIGRAM(S): at 20:25

## 2017-09-18 RX ADMIN — HYDROMORPHONE HYDROCHLORIDE 1 MILLIGRAM(S): 2 INJECTION INTRAMUSCULAR; INTRAVENOUS; SUBCUTANEOUS at 05:45

## 2017-09-18 RX ADMIN — Medication 10 UNIT(S): at 17:52

## 2017-09-18 RX ADMIN — METHADONE HYDROCHLORIDE 5 MILLIGRAM(S): 40 TABLET ORAL at 05:48

## 2017-09-18 RX ADMIN — Medication 2: at 09:00

## 2017-09-18 RX ADMIN — HYDROMORPHONE HYDROCHLORIDE 1 MILLIGRAM(S): 2 INJECTION INTRAMUSCULAR; INTRAVENOUS; SUBCUTANEOUS at 21:15

## 2017-09-18 RX ADMIN — HYDROMORPHONE HYDROCHLORIDE 1 MILLIGRAM(S): 2 INJECTION INTRAMUSCULAR; INTRAVENOUS; SUBCUTANEOUS at 12:26

## 2017-09-18 RX ADMIN — HYDROMORPHONE HYDROCHLORIDE 1 MILLIGRAM(S): 2 INJECTION INTRAMUSCULAR; INTRAVENOUS; SUBCUTANEOUS at 12:51

## 2017-09-18 RX ADMIN — HYDROMORPHONE HYDROCHLORIDE 1 MILLIGRAM(S): 2 INJECTION INTRAMUSCULAR; INTRAVENOUS; SUBCUTANEOUS at 21:45

## 2017-09-18 RX ADMIN — HYDROMORPHONE HYDROCHLORIDE 1 MILLIGRAM(S): 2 INJECTION INTRAMUSCULAR; INTRAVENOUS; SUBCUTANEOUS at 09:32

## 2017-09-18 RX ADMIN — Medication 3: at 13:38

## 2017-09-18 RX ADMIN — HYDROMORPHONE HYDROCHLORIDE 1 MILLIGRAM(S): 2 INJECTION INTRAMUSCULAR; INTRAVENOUS; SUBCUTANEOUS at 16:52

## 2017-09-18 RX ADMIN — Medication 8 UNIT(S): at 09:00

## 2017-09-18 RX ADMIN — Medication 4: at 17:51

## 2017-09-18 RX ADMIN — HYDROMORPHONE HYDROCHLORIDE 1 MILLIGRAM(S): 2 INJECTION INTRAMUSCULAR; INTRAVENOUS; SUBCUTANEOUS at 05:16

## 2017-09-18 RX ADMIN — Medication 2 MILLIGRAM(S): at 11:50

## 2017-09-18 RX ADMIN — HYDROMORPHONE HYDROCHLORIDE 1 MILLIGRAM(S): 2 INJECTION INTRAMUSCULAR; INTRAVENOUS; SUBCUTANEOUS at 16:32

## 2017-09-18 RX ADMIN — Medication 1 MILLIGRAM(S): at 06:48

## 2017-09-18 RX ADMIN — HYDROMORPHONE HYDROCHLORIDE 1 MILLIGRAM(S): 2 INJECTION INTRAMUSCULAR; INTRAVENOUS; SUBCUTANEOUS at 09:52

## 2017-09-18 NOTE — PROGRESS NOTE ADULT - PROBLEM SELECTOR PLAN 1
-Test BG ac and hs  -Increase Lantus dose  to 18 units qhs  -Increase meal time Humalog insulin to 10 units  -Continue present Humalog correction scales ac and hs  -Contact DM team with any changes on Dexa doses.  -Plan to discharge on Levemir (home insulin) and prandin ac meals. Doses TBD  -Plan discussed with pt/team.  Contact info: [phone] (24/7). pager [phone]

## 2017-09-18 NOTE — PROGRESS NOTE ADULT - SUBJECTIVE AND OBJECTIVE BOX
Patient is a 61y old  Male who presents with a chief complaint of Lethargy (15 Sep 2017 16:58)      SUBJECTIVE / OVERNIGHT EVENTS:  Continues to complain of the abdominal pain.  Review of Systems:   CONSTITUTIONAL: No fever, weight loss, or fatigue  EYES: No eye pain, visual disturbances, or discharge  ENMT:  No difficulty hearing, tinnitus, vertigo; No sinus or throat pain  NECK: No pain or stiffness  BREASTS: No pain, masses, or nipple discharge  RESPIRATORY: No cough, wheezing, chills or hemoptysis; No shortness of breath  CARDIOVASCULAR: No chest pain, palpitations, dizziness, or leg swelling  GASTROINTESTINAL: No abdominal or epigastric pain. No nausea, vomiting, or hematemesis; No diarrhea or constipation. No melena or hematochezia.  GENITOURINARY: No dysuria, frequency, hematuria, or incontinence  NEUROLOGICAL: No headaches, memory loss, loss of strength, numbness, or tremors  SKIN: No itching, burning, rashes, or lesions   LYMPH NODES: No enlarged glands  ENDOCRINE: No heat or cold intolerance; No hair loss  MUSCULOSKELETAL: No joint pain or swelling; No muscle, back, or extremity pain  PSYCHIATRIC: No depression, anxiety, mood swings, or difficulty sleeping  HEME/LYMPH: No easy bruising, or bleeding gums  ALLERY AND IMMUNOLOGIC: No hives or eczema    MEDICATIONS  (STANDING):  insulin lispro (HumaLOG) corrective regimen sliding scale   SubCutaneous three times a day before meals  insulin lispro (HumaLOG) corrective regimen sliding scale   SubCutaneous at bedtime  dextrose 5%. 1000 milliLiter(s) (50 mL/Hr) IV Continuous <Continuous>  dextrose 50% Injectable 12.5 Gram(s) IV Push once  dextrose 50% Injectable 25 Gram(s) IV Push once  dextrose 50% Injectable 25 Gram(s) IV Push once  LORazepam     Tablet 1 milliGRAM(s) Oral two times a day  pantoprazole    Tablet 40 milliGRAM(s) Oral before breakfast  insulin glargine Injectable (LANTUS) 18 Unit(s) SubCutaneous at bedtime  insulin lispro Injectable (HumaLOG) 10 Unit(s) SubCutaneous three times a day before meals  dexamethasone     Tablet 4 milliGRAM(s) Oral two times a day  methadone    Tablet 10 milliGRAM(s) Oral <User Schedule>  HYDROmorphone  Injectable 1 milliGRAM(s) IV Push every 6 hours    MEDICATIONS  (PRN):  ondansetron    Tablet 4 milliGRAM(s) Oral two times a day PRN Nausea and/or Vomiting  dextrose Gel 1 Dose(s) Oral once PRN Blood Glucose LESS THAN 70 milliGRAM(s)/deciliter  glucagon  Injectable 1 milliGRAM(s) IntraMuscular once PRN Glucose LESS THAN 70 milligrams/deciliter  naloxone Injectable 0.1 milliGRAM(s) IV Push every 3 minutes PRN Sedation or respiratory depression RR < 10  HYDROmorphone  Injectable 1 milliGRAM(s) IV Push every 2 hours PRN Breakthrough pain  LORazepam     Tablet 1 milliGRAM(s) Oral every 8 hours PRN Anxiety      PHYSICAL EXAM:  Vital Signs Last 24 Hrs  T(C): 36.9 (18 Sep 2017 20:20), Max: 36.9 (18 Sep 2017 12:15)  T(F): 98.4 (18 Sep 2017 20:20), Max: 98.5 (18 Sep 2017 12:15)  HR: 70 (18 Sep 2017 20:20) (52 - 70)  BP: 146/72 (18 Sep 2017 20:20) (112/65 - 146/72)  BP(mean): --  RR: 18 (18 Sep 2017 20:20) (18 - 18)  SpO2: 96% (18 Sep 2017 20:20) (96% - 98%)  I&O's Summary    17 Sep 2017 07:01  -  18 Sep 2017 07:00  --------------------------------------------------------  IN: 1930 mL / OUT: 0 mL / NET: 1930 mL    18 Sep 2017 07:01  -  18 Sep 2017 21:47  --------------------------------------------------------  IN: 1100 mL / OUT: 0 mL / NET: 1100 mL      GENERAL: NAD, well-developed  HEAD:  Atraumatic, Normocephalic  EYES: EOMI, PERRLA, conjunctiva and sclera clear  NECK: Supple, No JVD  CHEST/LUNG: Clear to auscultation bilaterally; No wheeze  HEART: Regular rate and rhythm; No murmurs, rubs, or gallops  ABDOMEN: Soft, Nontender, Nondistended; Bowel sounds present  EXTREMITIES:  2+ Peripheral Pulses, No clubbing, cyanosis, or edema  PSYCH: AAOx3  NEUROLOGY: non-focal  SKIN: No rashes or lesions    LABS:  CAPILLARY BLOOD GLUCOSE  332 (18 Sep 2017 17:11)  275 (18 Sep 2017 12:15)  222 (18 Sep 2017 07:23)                              10.8   5.35  )-----------( 126      ( 18 Sep 2017 09:11 )             32.6     09-18    129<L>  |  95<L>  |  28<H>  ----------------------------<  254<H>  4.3   |  24  |  0.73    Ca    8.6      18 Sep 2017 09:18                RADIOLOGY & ADDITIONAL TESTS:    Imaging Personally Reviewed:    Consultant(s) Notes Reviewed:      Care Discussed with Consultants/Other Providers:

## 2017-09-18 NOTE — PROGRESS NOTE ADULT - ASSESSMENT
62 y/o M w/h/o uncontrolled type 2 DM in hospice care secondary to liver ca/pancreatitis/Hep C. Here from hospice with AMS and hyperglycemiA. Pt now alert and able to answer all questions. Tolerating POs. Remains hyperglycemic with BG in 200s all the time. Goal is to prevent hypoglycemia and persistent hyperglycemia. Pt with insulin underdosing in the setting of steroid therapy.  Will increase insulin doses slowly.

## 2017-09-18 NOTE — PROGRESS NOTE ADULT - SUBJECTIVE AND OBJECTIVE BOX
DIABETES FOLLOW UP NOTE:  INTERVAL HX: 60 y/o M w/h/o uncontrolled type 2 DM in hospice care secondary to liver ca/pancreatitis/Hep C. Here from hospice with AMS and hyperglycemic. Pt now alert and able to answer all questions. Reports having pain when he eats but still he is able to eat most of his meals. Hyperglycemic due to insulin underdosing and  Dexa 2 bid.    Review of Systems: ;They need to give me more pain meds..I have excruciating pain   Respiratory: No SOB, no cough  GI: No nausea, vomiting, positive abdominal pain due to ca  Endocrine: no polyuria, polydipsia or S&Sx of hypoglycemia    Allergies    No Known Allergies    Intolerances      MEDICATIONS:  insulin lispro (HumaLOG) corrective regimen sliding scale   SubCutaneous three times a day before meals  insulin lispro (HumaLOG) corrective regimen sliding scale   SubCutaneous at bedtime  insulin glargine Injectable (LANTUS) 16 Unit(s) SubCutaneous at bedtime  insulin lispro Injectable (HumaLOG) 8 Unit(s) SubCutaneous three times a day before meals  dexamethasone     Tablet 2 milliGRAM(s) Oral two times a day    PHYSICAL EXAM:  VITALS: T(C): 36.9 (09-18-17 @ 12:15)  T(F): 98.5 (09-18-17 @ 12:15), Max: 98.5 (09-18-17 @ 12:15)  HR: 58 (09-18-17 @ 12:15) (52 - 63)  BP: 127/69 (09-18-17 @ 12:15) (109/70 - 135/67)  RR:  (18 - 18)  SpO2:  (96% - 98%)  Wt(kg): --  GENERAL: NAD  Abdomen: Enlarged liver with tenderness and hard at touch.   Extremities: Warm, no edema noted in all 4 exts  NEURO: A&Ox3. Able to answer all questions.    LABS:  CAPILLARY BLOOD GLUCOSE  275 (09-18 @ 12:15)  222 (09-18 @ 07:23)  195 (09-17 @ 20:45)  218 (09-17 @ 17:28)  283 (09-17 @ 11:40)  244 (09-17 @ 07:44)  308 (09-16 @ 20:38)  382 (09-16 @ 16:50)  257 (09-16 @ 11:55)  199 (09-16 @ 07:55)  222 (09-15 @ 21:49)  272 (09-15 @ 16:47)                            10.8   5.35  )-----------( 126      ( 18 Sep 2017 09:11 )             32.6       09-18    129<L>  |  95<L>  |  28<H>  ----------------------------<  254<H>  4.3   |  24  |  0.73    EGFR if : 117  EGFR if non : 101    Ca    8.6      09-18    Hemoglobin A1C, Whole Blood: 9.4 % <H> [4.0 - 5.6] (09-14-17 @ 07:44)

## 2017-09-18 NOTE — PROGRESS NOTE ADULT - SUBJECTIVE AND OBJECTIVE BOX
HPI:  62 y/o w/m h/o liver CA, pancreatitis, Hep-C , HTN ,IDDM. was at inpatient Hospice and was found wandering in another patients room and smearing feces according to EMS. As per [facility] nurse liaison, patient was found to have Temazepam and MS Contin that he appeared to be taking without informing the providers at inpatient hospice.  At the same time the patient was on a Dilaudid PCA and as per hospice, the patient may have become Delirious because of intoxication due to opioids and benzodiazepines.     We are currently f/u for symptoms and resources.     Today, the patient is c/o persistent pain over his abdomen that only improves temporarily with IV Dilaudid. He indicates he knows he is dying and that the only thing he is looking for is for appropriate symptomatic Rx. He indicated again he regrets what happened at [facility] (which he describes as being confused and then unfortunately using his home meds, thinking they were part of his inpatient hospice regimen) and that at no point he will do that again. He also agrees on a new hospice eval for either inpatient or outpatient depending on what hospice decides. He is also c/o anxiety.        PERTINENT PMH REVIEWED:  [x ] YES [ ] NO           SOCIAL HISTORY:   Significant other/partner:               Children:    2               Temple/Spirituality: [employer]   Substance hx:  [ ] YES   [x] NO                   Tobacco hx:  [ x] YES  [ ] NO                       Alcohol hx: [ ] YES  [ ] NO         Home Opioid hx:  [x ] YES  [ ] NO   Living Situation: [ x] Home  [ ] Long term care  [ ] Rehab [ ] Other  Patient indicated history of polysubstance abuse (Benzodiazepines and when he was young Cocaine)     FAMILY HISTORY:  No pertinent family history in first degree relatives    [ ] Family history non-contributory     BASELINE (I)ADLs (prior to admission):  [place]: [ ] total  [x ] moderate [ ] dependent    ADVANCE DIRECTIVES:    DNR [x ] YES [ ] NO                            [x ] Completed  MOLST  [ ] YES [x ] NO                      [ ] Completed  Health Care Proxy [ x] YES  [ ] NO   [ ] Completed.   Living Will  [ ] YES [ ] NO             [ ] Surrogate  [x ] HCP  [ ] Guardian:        Nevaeh Serrano                                                          Phone#:  [phone]    Allergies    No Known Allergies    Intolerances    MEDICATIONS  (STANDING):  insulin lispro (HumaLOG) corrective regimen sliding scale   SubCutaneous three times a day before meals  insulin lispro (HumaLOG) corrective regimen sliding scale   SubCutaneous at bedtime  dextrose 5%. 1000 milliLiter(s) (50 mL/Hr) IV Continuous <Continuous>  dextrose 50% Injectable 12.5 Gram(s) IV Push once  dextrose 50% Injectable 25 Gram(s) IV Push once  dextrose 50% Injectable 25 Gram(s) IV Push once  LORazepam     Tablet 1 milliGRAM(s) Oral two times a day  pantoprazole    Tablet 40 milliGRAM(s) Oral before breakfast  insulin glargine Injectable (LANTUS) 18 Unit(s) SubCutaneous at bedtime  insulin lispro Injectable (HumaLOG) 10 Unit(s) SubCutaneous three times a day before meals  dexamethasone     Tablet 4 milliGRAM(s) Oral two times a day  methadone    Tablet 10 milliGRAM(s) Oral <User Schedule>  HYDROmorphone  Injectable 1 milliGRAM(s) IV Push every 6 hours    MEDICATIONS  (PRN):  ondansetron    Tablet 4 milliGRAM(s) Oral two times a day PRN Nausea and/or Vomiting  dextrose Gel 1 Dose(s) Oral once PRN Blood Glucose LESS THAN 70 milliGRAM(s)/deciliter  glucagon  Injectable 1 milliGRAM(s) IntraMuscular once PRN Glucose LESS THAN 70 milligrams/deciliter  naloxone Injectable 0.1 milliGRAM(s) IV Push every 3 minutes PRN Sedation or respiratory depression RR < 10  HYDROmorphone  Injectable 1 milliGRAM(s) IV Push every 2 hours PRN Breakthrough pain  LORazepam     Tablet 1 milliGRAM(s) Oral every 8 hours PRN Anxiety              PRESENT SYMPTOMS:  Source: [x ] Patient   [ ] Family   [ ] Team     Pain:                        [ ] No [x ] Yes             [ ] Mild [ ] Moderate [x ] Severe    Onset - chronic   Location - RUQ and Epigastric region   Duration - Constant   Character - Cramping, Stabbing    Alleviating/Aggravating - Dilaudid   Radiation - band like towards LUQ   Timing - none      Dyspnea:                [x ] No [ ] Yes             [ ] Mild [ ] Moderate [ ] Severe    Anxiety:                  [ ] No [x ] Yes             [ ] Mild [x ] Moderate [ ] Severe    Fatigue:                  [ ] No [x ] Yes             [ ] Mild [x ] Moderate [ ] Severe    Nausea:                  [x ] No [ ] Yes             [ ] Mild [ ] Moderate [ ] Severe    Loss of appetite:   [ ] No [x ] Yes             [x ] Mild [ ] Moderate [ ] Severe    Constipation:        [x ] No [ ] Yes             [ ] Mild [ ] Moderate [ ] Severe    Other Symptoms:  [x ] All other review of systems negative   [ ] Unable to obtain due to poor mentation     Karnofsky Performance Score/Palliative Performance Status Version 2:  30-40       %    PHYSICAL EXAM:  Vital Signs Last 24 Hrs  T(C): 36.9 (18 Sep 2017 12:15), Max: 36.9 (18 Sep 2017 12:15)  T(F): 98.5 (18 Sep 2017 12:15), Max: 98.5 (18 Sep 2017 12:15)  HR: 58 (18 Sep 2017 12:15) (52 - 63)  BP: 127/69 (18 Sep 2017 12:15) (109/70 - 135/67)  BP(mean): --  RR: 18 (18 Sep 2017 12:15) (18 - 18)  SpO2: 97% (18 Sep 2017 12:15) (96% - 98%)      General:  [ x ]  Alert, Oriented x 3. Cachexia     HEENT:  [ ] Normal   [x ] Dry mouth   [ ] ET Tube    [ ] Trach  [ ] Oral lesions    Lungs:   [x ] Clear [ ] Tachypnea  [ ] Audible excessive secretions   [ ] Rhonchi        [ ] Right [ ] Left [ ] Bilateral  [ ] Crackles        [ ] Right [ ] Left [ ] Bilateral  [ ] Wheezing     [ ] Right [ ] Left [ ] Bilateral    Cardiovascular:  S1, S2. No S3. No murmurs     Abdomen: [ ] Soft  [x ] Distended   [x ] +BS x 4  [ ] Non tender [x ] Tender over RUQ, Epigastric region, and maci-umbilical area [ ]PEG   [ ]OGT/ NGT   Last BM:   9/15    Genitourinary: [x ] Normal [ ] Incontinent   [ ] Oliguria/Anuria   [ ] Alatorre    Musculoskeletal:  [ ] Normal   [x ] Weakness  [ ] Bedbound/Wheelchair bound [ ] Edema    Neurological: [x ] No focal deficits  [ ] Cognitive impairment  [ ] Dysphagia [ ] Dysarthria [ ] Paresis [ ] Other     Skin: [x ] Normal   [ ] Pressure ulcer(s)                  [ ] Rash    LABS:                                             10.8   5.35  )-----------( 126      ( 18 Sep 2017 09:11 )             32.6   09-18    129<L>  |  95<L>  |  28<H>  ----------------------------<  254<H>  4.3   |  24  |  0.73    Ca    8.6      18 Sep 2017 09:18      Shock: [ ] Septic [ ] Cardiogenic [ ] Neurologic [ ] Hypovolemic  Vasopressors x   Inotrophs x     Protein Calorie Malnutrition: [ ] Mild [ ] Moderate [ ] Severe    Oral Intake: [ ] Unable/mouth care only [ ] Minimal [ ] Moderate [ ] Full Capability  Diet: [ ] NPO [ ] Tube feeds [ ] TPN [ ] Other     RADIOLOGY & ADDITIONAL STUDIES:  < from: MRI Abdomen w/wo Cont (06.17.17 @ 08:47) >  EXAM:  MRI ABDOMEN WOW CON        PROCEDURE DATE:  06/17/2017  IMPRESSION: Cirrhosis.    Dominant liver mass in segments 5/8 is unchanged in size from most recent   CT dated May 23, 2017. Extensive right portal vein tumor invasion   extending just into the left and main portal vein is unchanged.    Another focus of hepatocellular carcinoma in segment 2 is mildly   increased in size compared with the MRI from April 24, 2017.    < end of copied text >    REFERRALS:   [ ] Chaplaincy  [ ] Hospice  [ ] Child Life  [ ] Social Work  [ ] Case management [ ] Holistic Therapy     Dominick Wilkerson MD [phone]  Assessment and Plan:

## 2017-09-18 NOTE — PROGRESS NOTE ADULT - PROBLEM SELECTOR PLAN 3
Resolved.   Likely related to polysubstance abuse  In multiple occasions, the patient has verbalized understanding about the risks of using other opiods or sedatives while using prescribed medications. He understands that death may be the outcome of it and he indicated he will not inquire on the same situation that brought him to the hospital.  Consider addiction psych eval. Methadone for pain Rx was started.

## 2017-09-18 NOTE — PROGRESS NOTE ADULT - PROBLEM SELECTOR PLAN 1
Patient used 10 mg IV Dilaudid over 24 hours.   Will increase Methadone to 10 mg 6/14/22 .   Will start Dilaudid 1 mg IV q 6 ATC and continuing 1 mg IV q 2 PRN   Will continue Naloxone PRN   Will increased Dexa to 4 mg BID  Patient indicates his main goal is to have appropriate symptomatic Rx.

## 2017-09-19 LAB
ANION GAP SERPL CALC-SCNC: 14 MMOL/L — SIGNIFICANT CHANGE UP (ref 5–17)
BUN SERPL-MCNC: 23 MG/DL — SIGNIFICANT CHANGE UP (ref 7–23)
CALCIUM SERPL-MCNC: 8.4 MG/DL — SIGNIFICANT CHANGE UP (ref 8.4–10.5)
CHLORIDE SERPL-SCNC: 95 MMOL/L — LOW (ref 96–108)
CO2 SERPL-SCNC: 22 MMOL/L — SIGNIFICANT CHANGE UP (ref 22–31)
CREAT SERPL-MCNC: 0.72 MG/DL — SIGNIFICANT CHANGE UP (ref 0.5–1.3)
GLUCOSE SERPL-MCNC: 297 MG/DL — HIGH (ref 70–99)
HCT VFR BLD CALC: 35.1 % — LOW (ref 39–50)
HGB BLD-MCNC: 11.8 G/DL — LOW (ref 13–17)
MAGNESIUM SERPL-MCNC: 2 MG/DL — SIGNIFICANT CHANGE UP (ref 1.6–2.6)
MCHC RBC-ENTMCNC: 32.2 PG — SIGNIFICANT CHANGE UP (ref 27–34)
MCHC RBC-ENTMCNC: 33.6 GM/DL — SIGNIFICANT CHANGE UP (ref 32–36)
MCV RBC AUTO: 95.6 FL — SIGNIFICANT CHANGE UP (ref 80–100)
PHOSPHATE SERPL-MCNC: 3.2 MG/DL — SIGNIFICANT CHANGE UP (ref 2.5–4.5)
PLATELET # BLD AUTO: 129 K/UL — LOW (ref 150–400)
POTASSIUM SERPL-MCNC: 4.5 MMOL/L — SIGNIFICANT CHANGE UP (ref 3.5–5.3)
POTASSIUM SERPL-SCNC: 4.5 MMOL/L — SIGNIFICANT CHANGE UP (ref 3.5–5.3)
RBC # BLD: 3.67 M/UL — LOW (ref 4.2–5.8)
RBC # FLD: 15.4 % — HIGH (ref 10.3–14.5)
SODIUM SERPL-SCNC: 131 MMOL/L — LOW (ref 135–145)
WBC # BLD: 6.32 K/UL — SIGNIFICANT CHANGE UP (ref 3.8–10.5)
WBC # FLD AUTO: 6.32 K/UL — SIGNIFICANT CHANGE UP (ref 3.8–10.5)

## 2017-09-19 PROCEDURE — 99233 SBSQ HOSP IP/OBS HIGH 50: CPT

## 2017-09-19 PROCEDURE — 93010 ELECTROCARDIOGRAM REPORT: CPT

## 2017-09-19 PROCEDURE — 99232 SBSQ HOSP IP/OBS MODERATE 35: CPT

## 2017-09-19 RX ORDER — INSULIN GLARGINE 100 [IU]/ML
10 INJECTION, SOLUTION SUBCUTANEOUS ONCE
Qty: 0 | Refills: 0 | Status: COMPLETED | OUTPATIENT
Start: 2017-09-19 | End: 2017-09-19

## 2017-09-19 RX ORDER — INSULIN LISPRO 100/ML
VIAL (ML) SUBCUTANEOUS
Qty: 0 | Refills: 0 | Status: DISCONTINUED | OUTPATIENT
Start: 2017-09-19 | End: 2017-09-27

## 2017-09-19 RX ORDER — INSULIN LISPRO 100/ML
14 VIAL (ML) SUBCUTANEOUS
Qty: 0 | Refills: 0 | Status: DISCONTINUED | OUTPATIENT
Start: 2017-09-19 | End: 2017-09-20

## 2017-09-19 RX ORDER — INSULIN GLARGINE 100 [IU]/ML
30 INJECTION, SOLUTION SUBCUTANEOUS AT BEDTIME
Qty: 0 | Refills: 0 | Status: DISCONTINUED | OUTPATIENT
Start: 2017-09-19 | End: 2017-09-20

## 2017-09-19 RX ADMIN — Medication 4 MILLIGRAM(S): at 05:33

## 2017-09-19 RX ADMIN — Medication 14 UNIT(S): at 12:56

## 2017-09-19 RX ADMIN — Medication 3: at 12:56

## 2017-09-19 RX ADMIN — HYDROMORPHONE HYDROCHLORIDE 1 MILLIGRAM(S): 2 INJECTION INTRAMUSCULAR; INTRAVENOUS; SUBCUTANEOUS at 17:04

## 2017-09-19 RX ADMIN — HYDROMORPHONE HYDROCHLORIDE 1 MILLIGRAM(S): 2 INJECTION INTRAMUSCULAR; INTRAVENOUS; SUBCUTANEOUS at 13:20

## 2017-09-19 RX ADMIN — HYDROMORPHONE HYDROCHLORIDE 1 MILLIGRAM(S): 2 INJECTION INTRAMUSCULAR; INTRAVENOUS; SUBCUTANEOUS at 21:31

## 2017-09-19 RX ADMIN — Medication 14 UNIT(S): at 09:28

## 2017-09-19 RX ADMIN — HYDROMORPHONE HYDROCHLORIDE 1 MILLIGRAM(S): 2 INJECTION INTRAMUSCULAR; INTRAVENOUS; SUBCUTANEOUS at 05:33

## 2017-09-19 RX ADMIN — HYDROMORPHONE HYDROCHLORIDE 1 MILLIGRAM(S): 2 INJECTION INTRAMUSCULAR; INTRAVENOUS; SUBCUTANEOUS at 07:48

## 2017-09-19 RX ADMIN — HYDROMORPHONE HYDROCHLORIDE 1 MILLIGRAM(S): 2 INJECTION INTRAMUSCULAR; INTRAVENOUS; SUBCUTANEOUS at 01:06

## 2017-09-19 RX ADMIN — Medication 4 MILLIGRAM(S): at 17:04

## 2017-09-19 RX ADMIN — HYDROMORPHONE HYDROCHLORIDE 1 MILLIGRAM(S): 2 INJECTION INTRAMUSCULAR; INTRAVENOUS; SUBCUTANEOUS at 12:10

## 2017-09-19 RX ADMIN — HYDROMORPHONE HYDROCHLORIDE 1 MILLIGRAM(S): 2 INJECTION INTRAMUSCULAR; INTRAVENOUS; SUBCUTANEOUS at 06:03

## 2017-09-19 RX ADMIN — INSULIN GLARGINE 30 UNIT(S): 100 INJECTION, SOLUTION SUBCUTANEOUS at 22:24

## 2017-09-19 RX ADMIN — Medication 1 MILLIGRAM(S): at 05:32

## 2017-09-19 RX ADMIN — Medication 14 UNIT(S): at 17:54

## 2017-09-19 RX ADMIN — METHADONE HYDROCHLORIDE 10 MILLIGRAM(S): 40 TABLET ORAL at 22:24

## 2017-09-19 RX ADMIN — HYDROMORPHONE HYDROCHLORIDE 1 MILLIGRAM(S): 2 INJECTION INTRAMUSCULAR; INTRAVENOUS; SUBCUTANEOUS at 00:29

## 2017-09-19 RX ADMIN — Medication 6: at 17:54

## 2017-09-19 RX ADMIN — HYDROMORPHONE HYDROCHLORIDE 1 MILLIGRAM(S): 2 INJECTION INTRAMUSCULAR; INTRAVENOUS; SUBCUTANEOUS at 03:38

## 2017-09-19 RX ADMIN — INSULIN GLARGINE 10 UNIT(S): 100 INJECTION, SOLUTION SUBCUTANEOUS at 10:09

## 2017-09-19 RX ADMIN — Medication 3: at 09:28

## 2017-09-19 RX ADMIN — HYDROMORPHONE HYDROCHLORIDE 1 MILLIGRAM(S): 2 INJECTION INTRAMUSCULAR; INTRAVENOUS; SUBCUTANEOUS at 22:01

## 2017-09-19 RX ADMIN — METHADONE HYDROCHLORIDE 10 MILLIGRAM(S): 40 TABLET ORAL at 14:58

## 2017-09-19 RX ADMIN — HYDROMORPHONE HYDROCHLORIDE 1 MILLIGRAM(S): 2 INJECTION INTRAMUSCULAR; INTRAVENOUS; SUBCUTANEOUS at 11:49

## 2017-09-19 RX ADMIN — HYDROMORPHONE HYDROCHLORIDE 1 MILLIGRAM(S): 2 INJECTION INTRAMUSCULAR; INTRAVENOUS; SUBCUTANEOUS at 17:25

## 2017-09-19 RX ADMIN — Medication 1 MILLIGRAM(S): at 17:53

## 2017-09-19 RX ADMIN — HYDROMORPHONE HYDROCHLORIDE 1 MILLIGRAM(S): 2 INJECTION INTRAMUSCULAR; INTRAVENOUS; SUBCUTANEOUS at 03:04

## 2017-09-19 RX ADMIN — HYDROMORPHONE HYDROCHLORIDE 1 MILLIGRAM(S): 2 INJECTION INTRAMUSCULAR; INTRAVENOUS; SUBCUTANEOUS at 13:00

## 2017-09-19 RX ADMIN — PANTOPRAZOLE SODIUM 40 MILLIGRAM(S): 20 TABLET, DELAYED RELEASE ORAL at 05:32

## 2017-09-19 RX ADMIN — METHADONE HYDROCHLORIDE 10 MILLIGRAM(S): 40 TABLET ORAL at 05:32

## 2017-09-19 RX ADMIN — HYDROMORPHONE HYDROCHLORIDE 1 MILLIGRAM(S): 2 INJECTION INTRAMUSCULAR; INTRAVENOUS; SUBCUTANEOUS at 08:20

## 2017-09-19 NOTE — PROGRESS NOTE ADULT - SUBJECTIVE AND OBJECTIVE BOX
DIABETES FOLLOW UP NOTE: Saw pt earlier today  INTERVAL HX: 62 y/o M w/h/o uncontrolled type 2 DM in hospice care secondary to liver ca/pancreatitis/Hep C. Here from hospice with AMS and hyperglycemic. Pt reports better pain control today after his pain meds were adjusted. Tolerating POs.  Glycemic control worsen after primary team increased Dexa from 2mg to 4mg bid. BG 200s to 300s.     Review of Systems: 'better today"  Respiratory: No SOB, no cough  GI: No nausea, vomiting, variable abdominal pain due to ca  Endocrine: no polyuria, polydipsia or S&Sx of hypoglycemia    Allergies    No Known Allergies    Intolerances      MEDICATIONS:  insulin lispro (HumaLOG) corrective regimen sliding scale   SubCutaneous three times a day before meals  insulin lispro (HumaLOG) corrective regimen sliding scale   SubCutaneous at bedtime  insulin glargine Injectable (LANTUS) 18 Unit(s) SubCutaneous at bedtime  insulin lispro Injectable (HumaLOG) 10 Unit(s) SubCutaneous three times a day before meals  dexamethasone     Tablet 4 milliGRAM(s) Oral two times a day    PHYSICAL EXAM:  Vital Signs Last 24 Hrs  T(C): 36.4 (09-19-17 @ 12:21), Max: 36.9 (09-18-17 @ 20:20)  T(F): 97.6 (09-19-17 @ 12:21), Max: 98.4 (09-18-17 @ 20:20)  HR: 71 (09-19-17 @ 12:21) (59 - 71)  BP: 113/67 (09-19-17 @ 12:21) (113/67 - 154/79)  BP(mean): --  RR: 18 (09-19-17 @ 12:21) (18 - 18)  SpO2: 96% (09-19-17 @ 12:21) (96% - 97%)  GENERAL: NAD  Abdomen: Enlarged liver with tenderness and hard at touch.   Extremities: Warm, no edema noted in all 4 exts  NEURO: A&Ox3. Able to answer all questions.    LABS:  CAPILLARY BLOOD GLUCOSE  278 (19 Sep 2017 12:21)  263 (19 Sep 2017 07:32)  265 (18 Sep 2017 21:56)  332 (18 Sep 2017 17:11)  275 (18 Sep 2017 12:15)  222 (18 Sep 2017 07:23)  195 (17 Sep 2017 20:45)  218 (17 Sep 2017 17:28)  283 (17 Sep 2017 11:40)  244 (17 Sep 2017 07:44)  308 (16 Sep 2017 20:38)  382 (16 Sep 2017 16:50)                                 11.8   6.32  )-----------( 129      ( 19 Sep 2017 08:51 )             35.1     09-19    131<L>  |  95<L>  |  23  ----------------------------<  297<H>  4.5   |  22  |  0.72    Ca    8.4      19 Sep 2017 08:41  Phos  3.2     09-19  Mg     2.0     09-19    Hemoglobin A1C, Whole Blood: 9.4 % <H> [4.0 - 5.6] (09-14-17 @ 07:44)

## 2017-09-19 NOTE — PROVIDER CONTACT NOTE (OTHER) - BACKGROUND
pt admitted with Altered mental status. PMH of Liver cancer, HTN, Depression, Hep C. , DM type 2, Anxiety, Diabetic neuropathy, Emphysema lung

## 2017-09-19 NOTE — PROGRESS NOTE ADULT - PROBLEM SELECTOR PLAN 1
-Test BG ac and hs  -Increase Lantus dose  to 30 units qhs. Give 10 units stat(done)  -Increase meal time Humalog insulin to 14 units  -Increase Humalog correction scales ac and hs to moderate dose  -Contact DM team with any changes on Dexa doses!!!  -Plan to discharge on Levemir (home insulin) and prandin ac meals. Doses TBD depending on discharge disposition, insulin requirements and steroid doses  -Plan discussed with pt/team.  Contact info: [phone] (24/7). pager [phone]

## 2017-09-19 NOTE — PROGRESS NOTE ADULT - ASSESSMENT
62 y/o M w/h/o uncontrolled type 2 DM in hospice care secondary to liver ca/pancreatitis/Hep C. Here from hospice with AMS and hyperglycemiA. Pt on steroids with dose doubled yesterday PM causing worsening hyperglycemia. Pt tolerating POs.  Will increase insulin doses further due to hyperglycemia and increased on steroid doses.

## 2017-09-19 NOTE — PROGRESS NOTE ADULT - SUBJECTIVE AND OBJECTIVE BOX
Patient is a 61y old  Male who presents with a chief complaint of Lethargy (15 Sep 2017 16:58)      SUBJECTIVE / OVERNIGHT EVENTS:  Claims the pain is getting better.  Denies any shortness of breath.  Denies any nausea, vomiting or diarrhea.  Review of Systems:   CONSTITUTIONAL: No fever, weight loss, or fatigue  EYES: No eye pain, visual disturbances, or discharge  ENMT:  No difficulty hearing, tinnitus, vertigo; No sinus or throat pain  NECK: No pain or stiffness  BREASTS: No pain, masses, or nipple discharge  RESPIRATORY: No cough, wheezing, chills or hemoptysis; No shortness of breath  CARDIOVASCULAR: No chest pain, palpitations, dizziness, or leg swelling  GASTROINTESTINAL: No abdominal or epigastric pain. No nausea, vomiting, or hematemesis; No diarrhea or constipation. No melena or hematochezia.  GENITOURINARY: No dysuria, frequency, hematuria, or incontinence  NEUROLOGICAL: No headaches, memory loss, loss of strength, numbness, or tremors  SKIN: No itching, burning, rashes, or lesions   LYMPH NODES: No enlarged glands  ENDOCRINE: No heat or cold intolerance; No hair loss  MUSCULOSKELETAL: No joint pain or swelling; No muscle, back, or extremity pain  PSYCHIATRIC: No depression, anxiety, mood swings, or difficulty sleeping  HEME/LYMPH: No easy bruising, or bleeding gums  ALLERY AND IMMUNOLOGIC: No hives or eczema    MEDICATIONS  (STANDING):  insulin lispro (HumaLOG) corrective regimen sliding scale   SubCutaneous three times a day before meals  insulin lispro (HumaLOG) corrective regimen sliding scale   SubCutaneous at bedtime  dextrose 5%. 1000 milliLiter(s) (50 mL/Hr) IV Continuous <Continuous>  dextrose 50% Injectable 12.5 Gram(s) IV Push once  dextrose 50% Injectable 25 Gram(s) IV Push once  dextrose 50% Injectable 25 Gram(s) IV Push once  LORazepam     Tablet 1 milliGRAM(s) Oral two times a day  pantoprazole    Tablet 40 milliGRAM(s) Oral before breakfast  dexamethasone     Tablet 4 milliGRAM(s) Oral two times a day  methadone    Tablet 10 milliGRAM(s) Oral <User Schedule>  HYDROmorphone  Injectable 1 milliGRAM(s) IV Push every 6 hours  insulin glargine Injectable (LANTUS) 30 Unit(s) SubCutaneous at bedtime  insulin lispro Injectable (HumaLOG) 14 Unit(s) SubCutaneous three times a day before meals    MEDICATIONS  (PRN):  ondansetron    Tablet 4 milliGRAM(s) Oral two times a day PRN Nausea and/or Vomiting  dextrose Gel 1 Dose(s) Oral once PRN Blood Glucose LESS THAN 70 milliGRAM(s)/deciliter  glucagon  Injectable 1 milliGRAM(s) IntraMuscular once PRN Glucose LESS THAN 70 milligrams/deciliter  naloxone Injectable 0.1 milliGRAM(s) IV Push every 3 minutes PRN Sedation or respiratory depression RR < 10  HYDROmorphone  Injectable 1 milliGRAM(s) IV Push every 2 hours PRN Breakthrough pain  LORazepam     Tablet 1 milliGRAM(s) Oral every 8 hours PRN Anxiety      PHYSICAL EXAM:  Vital Signs Last 24 Hrs  T(C): 36.7 (19 Sep 2017 05:25), Max: 36.9 (18 Sep 2017 12:15)  T(F): 98 (19 Sep 2017 05:25), Max: 98.5 (18 Sep 2017 12:15)  HR: 59 (19 Sep 2017 05:25) (58 - 70)  BP: 154/79 (19 Sep 2017 05:25) (127/69 - 154/79)  BP(mean): --  RR: 18 (19 Sep 2017 05:25) (18 - 18)  SpO2: 97% (19 Sep 2017 05:25) (96% - 97%)  I&O's Summary    18 Sep 2017 07:01  -  19 Sep 2017 07:00  --------------------------------------------------------  IN: 1580 mL / OUT: 0 mL / NET: 1580 mL      GENERAL: NAD, well-developed  HEAD:  Atraumatic, Normocephalic  EYES: EOMI, PERRLA, conjunctiva and sclera clear  NECK: Supple, No JVD  CHEST/LUNG: Clear to auscultation bilaterally; No wheeze  HEART: Regular rate and rhythm; No murmurs, rubs, or gallops  ABDOMEN: Soft, Nontender, Nondistended; Bowel sounds present  EXTREMITIES:  2+ Peripheral Pulses, No clubbing, cyanosis, or edema  PSYCH: AAOx3  NEUROLOGY: non-focal  SKIN: No rashes or lesions    LABS:  CAPILLARY BLOOD GLUCOSE  263 (19 Sep 2017 07:32)  265 (18 Sep 2017 21:56)  332 (18 Sep 2017 17:11)  275 (18 Sep 2017 12:15)                              11.8   6.32  )-----------( 129      ( 19 Sep 2017 08:51 )             35.1     09-19    131<L>  |  95<L>  |  23  ----------------------------<  297<H>  4.5   |  22  |  0.72    Ca    8.4      19 Sep 2017 08:41  Phos  3.2     09-19  Mg     2.0     09-19                RADIOLOGY & ADDITIONAL TESTS:    Imaging Personally Reviewed:    Consultant(s) Notes Reviewed:      Care Discussed with Consultants/Other Providers:

## 2017-09-19 NOTE — PROGRESS NOTE BEHAVIORAL HEALTH - ORIENTED TO PERSON
Post-Anesthesia Evaluation and Assessment    Patient: Hodan Ortega MRN: [id number]  SSN: [id number]    YOB: 1950  Age: 73 y.o. Sex: male       Cardiovascular Function/Vital Signs  Visit Vitals    /69    Pulse 69    Temp 36.4 °C (97.5 °F)    Resp 16    Ht 5' 5\" (1.651 m)    Wt 77.1 kg (170 lb)    SpO2 95%    BMI 28.29 kg/m2       Patient is status post general, total IV anesthesia anesthesia for Procedure(s):  ESOPHAGOGASTRODUODENOSCOPY (EGD). Nausea/Vomiting: None    Postoperative hydration reviewed and adequate. Pain:  Pain Scale 1: Numeric (0 - 10) (03/07/17 1318)  Pain Intensity 1: 0 (03/07/17 1318)   Managed    Neurological Status: At baseline    Mental Status and Level of Consciousness: Arousable    Pulmonary Status:   O2 Device: Room air (03/07/17 1437)   Adequate oxygenation and airway patent    Complications related to anesthesia: None    Post-anesthesia assessment completed.  No concerns    Signed By: Juliana Holly MD     March 7, 2017
Yes
Yes

## 2017-09-19 NOTE — PROVIDER CONTACT NOTE (OTHER) - ACTION/TREATMENT ORDERED:
LEILA Shirley made aware, to place results in pt chart for review LEILA Fonseca made aware, to place results in pt chart for review

## 2017-09-19 NOTE — PROGRESS NOTE BEHAVIORAL HEALTH - NSBHCHARTREVIEWVS_PSY_A_CORE FT
Vital Signs Last 24 Hrs  T(C): 37.1 (14 Sep 2017 12:11), Max: 37.2 (13 Sep 2017 16:48)  T(F): 98.8 (14 Sep 2017 12:11), Max: 98.9 (13 Sep 2017 16:48)  HR: 63 (14 Sep 2017 12:11) (55 - 64)  BP: 164/86 (14 Sep 2017 12:11) (132/60 - 171/79)  BP(mean): --  RR: 18 (14 Sep 2017 12:11) (16 - 18)  SpO2: 97% (14 Sep 2017 12:11) (97% - 100%)
Vital Signs Last 24 Hrs  T(C): 36.7 (19 Sep 2017 05:25), Max: 36.9 (18 Sep 2017 12:15)  T(F): 98 (19 Sep 2017 05:25), Max: 98.5 (18 Sep 2017 12:15)  HR: 59 (19 Sep 2017 05:25) (58 - 70)  BP: 154/79 (19 Sep 2017 05:25) (127/69 - 154/79)  BP(mean): --  RR: 18 (19 Sep 2017 05:25) (18 - 18)  SpO2: 97% (19 Sep 2017 05:25) (96% - 97%)

## 2017-09-19 NOTE — PROGRESS NOTE ADULT - SUBJECTIVE AND OBJECTIVE BOX
HPI:  60 y/o w/m h/o liver CA, pancreatitis, Hep-C , HTN ,IDDM. was at inpatient Hospice and was found wandering in another patients room and smearing feces according to EMS. As per [facility] nurse liaison, patient was found to have Temazepam and MS Contin that he appeared to be taking without informing the providers at inpatient hospice.  At the same time the patient was on a Dilaudid PCA and as per hospice, the patient may have become Delirious because of intoxication due to opioids and benzodiazepines.     We are currently f/u for symptoms and resources.     Today, the patient indicates pain is improving but still moderate to severe. He has used less PRN medications since I changed his regimen yesterday afternoon. He used only 3 PRN over the last 12 hours. He also indicates anxiety that is improving as well.       PERTINENT PMH REVIEWED:  [x ] YES [ ] NO           SOCIAL HISTORY:   Significant other/partner:               Children:    2               Gnosticist/Spirituality: Anabaptist   Substance hx:  [ ] YES   [x] NO                   Tobacco hx:  [ x] YES  [ ] NO                       Alcohol hx: [ ] YES  [ ] NO         Home Opioid hx:  [x ] YES  [ ] NO   Living Situation: [ x] Home  [ ] Long term care  [ ] Rehab [ ] Other  Patient indicated history of polysubstance abuse (Benzodiazepines and when he was young Cocaine)     FAMILY HISTORY:  No pertinent family history in first degree relatives    [ ] Family history non-contributory     BASELINE (I)ADLs (prior to admission):  [place]: [ ] total  [x ] moderate [ ] dependent    ADVANCE DIRECTIVES:    DNR [x ] YES [ ] NO                            [x ] Completed  MOLST  [ ] YES [x ] NO                      [ ] Completed  Health Care Proxy [ x] YES  [ ] NO   [ ] Completed.   Living Will  [ ] YES [ ] NO             [ ] Surrogate  [x ] HCP  [ ] Guardian:        Nevaeh Serrano                                                          Phone#:  [phone]    Allergies    No Known Allergies    Intolerances    MEDICATIONS  (STANDING):  insulin lispro (HumaLOG) corrective regimen sliding scale   SubCutaneous at bedtime  dextrose 5%. 1000 milliLiter(s) (50 mL/Hr) IV Continuous <Continuous>  dextrose 50% Injectable 12.5 Gram(s) IV Push once  dextrose 50% Injectable 25 Gram(s) IV Push once  dextrose 50% Injectable 25 Gram(s) IV Push once  LORazepam     Tablet 1 milliGRAM(s) Oral two times a day  pantoprazole    Tablet 40 milliGRAM(s) Oral before breakfast  dexamethasone     Tablet 4 milliGRAM(s) Oral two times a day  methadone    Tablet 10 milliGRAM(s) Oral <User Schedule>  HYDROmorphone  Injectable 1 milliGRAM(s) IV Push every 6 hours  insulin glargine Injectable (LANTUS) 30 Unit(s) SubCutaneous at bedtime  insulin lispro Injectable (HumaLOG) 14 Unit(s) SubCutaneous three times a day before meals  insulin lispro (HumaLOG) corrective regimen sliding scale   SubCutaneous three times a day before meals    MEDICATIONS  (PRN):  ondansetron    Tablet 4 milliGRAM(s) Oral two times a day PRN Nausea and/or Vomiting  dextrose Gel 1 Dose(s) Oral once PRN Blood Glucose LESS THAN 70 milliGRAM(s)/deciliter  glucagon  Injectable 1 milliGRAM(s) IntraMuscular once PRN Glucose LESS THAN 70 milligrams/deciliter  naloxone Injectable 0.1 milliGRAM(s) IV Push every 3 minutes PRN Sedation or respiratory depression RR < 10  HYDROmorphone  Injectable 1 milliGRAM(s) IV Push every 2 hours PRN Breakthrough pain  LORazepam     Tablet 1 milliGRAM(s) Oral every 8 hours PRN Anxiety              PRESENT SYMPTOMS:  Source: [x ] Patient   [ ] Family   [ ] Team     Pain:                        [ ] No [x ] Yes             [ ] Mild [x ] Moderate  to Severe    Onset - chronic   Location - RUQ and Epigastric region   Duration - Constant   Character - Cramping, Stabbing    Alleviating/Aggravating - Dilaudid   Radiation - band like towards LUQ   Timing - none      Dyspnea:                [x ] No [ ] Yes             [ ] Mild [ ] Moderate [ ] Severe    Anxiety:                  [ ] No [x ] Yes             [ ] Mild [x ] Moderate [ ] Severe    Fatigue:                  [ ] No [x ] Yes             [ ] Mild [x ] Moderate [ ] Severe    Nausea:                  [x ] No [ ] Yes             [ ] Mild [ ] Moderate [ ] Severe    Loss of appetite:   [ ] No [x ] Yes             [x ] Mild [ ] Moderate [ ] Severe    Constipation:        [x ] No [ ] Yes             [ ] Mild [ ] Moderate [ ] Severe    Other Symptoms:  [x ] All other review of systems negative   [ ] Unable to obtain due to poor mentation     Karnofsky Performance Score/Palliative Performance Status Version 2:  30-40       %    PHYSICAL EXAM:    Vital Signs Last 24 Hrs  T(C): 36.4 (19 Sep 2017 12:21), Max: 36.9 (18 Sep 2017 20:20)  T(F): 97.6 (19 Sep 2017 12:21), Max: 98.4 (18 Sep 2017 20:20)  HR: 71 (19 Sep 2017 12:21) (59 - 71)  BP: 113/67 (19 Sep 2017 12:21) (113/67 - 154/79)  BP(mean): --  RR: 18 (19 Sep 2017 12:21) (18 - 18)  SpO2: 96% (19 Sep 2017 12:21) (96% - 97%)      General:  [ x ]  Alert, Oriented x 3. Cachexia     HEENT:  [ ] Normal   [x ] Dry mouth   [ ] ET Tube    [ ] Trach  [ ] Oral lesions    Lungs:   [x ] Clear [ ] Tachypnea  [ ] Audible excessive secretions   [ ] Rhonchi        [ ] Right [ ] Left [ ] Bilateral  [ ] Crackles        [ ] Right [ ] Left [ ] Bilateral  [ ] Wheezing     [ ] Right [ ] Left [ ] Bilateral    Cardiovascular:  S1, S2. No S3. No murmurs     Abdomen: [ ] Soft  [x ] Distended   [x ] +BS x 4  [ ] Non tender [x ] Tender over RUQ, Epigastric region, and maci-umbilical area [ ]PEG   [ ]OGT/ NGT   Last BM:   9/18    Genitourinary: [x ] Normal [ ] Incontinent   [ ] Oliguria/Anuria   [ ] Alatorre    Musculoskeletal:  [ ] Normal   [x ] Weakness  [ ] Bedbound/Wheelchair bound [ ] Edema    Neurological: [x ] No focal deficits  [ ] Cognitive impairment  [ ] Dysphagia [ ] Dysarthria [ ] Paresis [ ] Other     Skin: [x ] Normal   [ ] Pressure ulcer(s)                  [ ] Rash    LABS:                                                                   11.8   6.32  )-----------( 129      ( 19 Sep 2017 08:51 )             35.1   09-19    131<L>  |  95<L>  |  23  ----------------------------<  297<H>  4.5   |  22  |  0.72    Ca    8.4      19 Sep 2017 08:41  Phos  3.2     09-19  Mg     2.0     09-19      Shock: [ ] Septic [ ] Cardiogenic [ ] Neurologic [ ] Hypovolemic  Vasopressors x   Inotrophs x     Protein Calorie Malnutrition: [ ] Mild [ ] Moderate [ ] Severe    Oral Intake: [ ] Unable/mouth care only [ ] Minimal [ ] Moderate [ ] Full Capability  Diet: [ ] NPO [ ] Tube feeds [ ] TPN [ ] Other     RADIOLOGY & ADDITIONAL STUDIES:  < from: MRI Abdomen w/wo Cont (06.17.17 @ 08:47) >  EXAM:  MRI ABDOMEN WOW CON        PROCEDURE DATE:  06/17/2017  IMPRESSION: Cirrhosis.    Dominant liver mass in segments 5/8 is unchanged in size from most recent   CT dated May 23, 2017. Extensive right portal vein tumor invasion   extending just into the left and main portal vein is unchanged.    Another focus of hepatocellular carcinoma in segment 2 is mildly   increased in size compared with the MRI from April 24, 2017.    < end of copied text >    REFERRALS:   [ ] Chaplaincy  [ ] Hospice  [ ] Child Life  [ ] Social Work  [ ] Case management [ ] Holistic Therapy     Dominick Wilkerson MD [phone]  Assessment and Plan:

## 2017-09-19 NOTE — PROGRESS NOTE ADULT - PROBLEM SELECTOR PLAN 4
Patient is DNR/I  Floor SW and  to further help with the patient's psychosocial situation as well as on advising him in regards to how to complete other paperwork such as a Power of   Hospice Referral was d/w .   Case d/w primary team

## 2017-09-19 NOTE — CONSULT NOTE ADULT - ASSESSMENT
60 y/o w/m h/o liver CA, pancreatitis, Hep-C , HTN ,IDDM. was at Hospice admitted with delirium  1- hyponatrermia  likely in setting of cirrhosis and increase po fluid intake.   in addition when corrected for hyperglycemia today na is only borderline low. no intervention at present. if worsens again will consider fluid restriction.   pain control and anti-emetics as needed given pain and Nausea can trigger siadh as well

## 2017-09-19 NOTE — PROGRESS NOTE BEHAVIORAL HEALTH - SUMMARY
Pt is a 60 y/o male, mult med issues, liver cancer, came from hospice [facility], admitted for abd pain, ams, agitation. pt was on ativan, morphine in hospice unit. pt denies si/hi, no agitation overnight, c/o abd pain persistent, pt off ativan currently. no signs of withrdawal.
Pt is a 62 y/o male, mult med issues, liver cancer, came from hospice [facility], admitted for abd pain, ams, agitation. pt was on ativan, morphine in hospice unit. pt denies si/hi, no agitation overnight, c/o abd pain persistent, no acute mood issues, or anxiety. pt seen today for capacity for d/c planning, pt is agreeable to go home with an aide.

## 2017-09-19 NOTE — PROGRESS NOTE BEHAVIORAL HEALTH - NSBHFUPINTERVALHXFT_PSY_A_CORE
Pt c/o abdominal pain, feels his pain is not being managed appropriately. Pt denies hallucinations, paranoia, no si/hi. Pt denies mood symptoms currently. no prns overnight. as per 1:1 staff, pt calm overnight
pt is agreeable to go home with an aide. pt was ablet to understand the risks/benefits. pt is unable to return to the hospice unit. pt still c/o pain, but under better control. pt denies hallucinations, paranoia, no si/hi. Pt denies mood symptoms currently. no prns overnight. as per 1:1 staff, pt calm overnight

## 2017-09-19 NOTE — CONSULT NOTE ADULT - SUBJECTIVE AND OBJECTIVE BOX
[facility]  [phone]  NEPHROLOGY      INITIAL CONSULT NOTE  --------------------------------------------------------------------------------  HPI:    HPI:  62 y/o w/m h/o liver CA, pancreatitis, Hep-C , HTN ,IDDM. was at Hospice and found wandering in another patients room and smearing feces according to EMS. He complains of abdominal pain which he admits has been there for at least one month. He was on PCA pain pump at hospice and was found to be taking PO morphine in addition, which was not known to the staff at hospice. They suspect that he has overdosed on opiates causing delirium. he was admitted. also noticed with hyponatremia renal consult called.     PAST HISTORY  --------------------------------------------------------------------------------  PAST MEDICAL & SURGICAL HISTORY:  Liver cancer  Weight loss, unintentional: 45 lbs in 1 yr  Type 2 diabetes mellitus: IDDM  Smoker  Chronic pain with drug dependence: On suboxone now  HTN (hypertension): no medications  Emphysema lung  Depression  Diabetic neuropathy  Chronic alcoholic pancreatitis  Hepatitis C  S/P inguinal hernia repair: left  S/P arthroscopy of right knee  S/P shoulder surgery: bilateral  History of cholecystectomy    FAMILY HISTORY:  No pertinent family history in first degree relatives    PAST SOCIAL HISTORY:    ALLERGIES & MEDICATIONS  --------------------------------------------------------------------------------  Allergies    No Known Allergies    Intolerances      Standing Inpatient Medications  insulin lispro (HumaLOG) corrective regimen sliding scale   SubCutaneous at bedtime  dextrose 5%. 1000 milliLiter(s) IV Continuous <Continuous>  dextrose 50% Injectable 12.5 Gram(s) IV Push once  dextrose 50% Injectable 25 Gram(s) IV Push once  dextrose 50% Injectable 25 Gram(s) IV Push once  LORazepam     Tablet 1 milliGRAM(s) Oral two times a day  pantoprazole    Tablet 40 milliGRAM(s) Oral before breakfast  dexamethasone     Tablet 4 milliGRAM(s) Oral two times a day  methadone    Tablet 10 milliGRAM(s) Oral <User Schedule>  HYDROmorphone  Injectable 1 milliGRAM(s) IV Push every 6 hours  insulin glargine Injectable (LANTUS) 30 Unit(s) SubCutaneous at bedtime  insulin lispro Injectable (HumaLOG) 14 Unit(s) SubCutaneous three times a day before meals  insulin lispro (HumaLOG) corrective regimen sliding scale   SubCutaneous three times a day before meals    PRN Inpatient Medications  ondansetron    Tablet 4 milliGRAM(s) Oral two times a day PRN  dextrose Gel 1 Dose(s) Oral once PRN  glucagon  Injectable 1 milliGRAM(s) IntraMuscular once PRN  naloxone Injectable 0.1 milliGRAM(s) IV Push every 3 minutes PRN  HYDROmorphone  Injectable 1 milliGRAM(s) IV Push every 2 hours PRN  LORazepam     Tablet 1 milliGRAM(s) Oral every 8 hours PRN      REVIEW OF SYSTEMS  --------------------------------------------------------------------------------  Gen: decrease  weight + fatigue, -fevers/chills, increase po intake  Skin: No rashes  Respiratory: No dyspnea, cough, hemoptysis  CV: No chest pain, no palp   GI: + abdominal pain, -diarrhea, + nausea, -vomiting,   : No increased frequency, dysuria,  Neuro: +  dizziness/lightheadedness, - seizures      VITALS/PHYSICAL EXAM  --------------------------------------------------------------------------------  T(C): 36.8 (09-19-17 @ 19:56), Max: 36.8 (09-19-17 @ 19:56)  HR: 65 (09-19-17 @ 19:56) (59 - 71)  BP: 156/80 (09-19-17 @ 19:56) (113/67 - 156/80)  RR: 18 (09-19-17 @ 19:56) (18 - 18)  SpO2: 98% (09-19-17 @ 19:56) (96% - 98%)  Wt(kg): --        09-18-17 @ 07:01  -  09-19-17 @ 07:00  --------------------------------------------------------  IN: 1580 mL / OUT: 0 mL / NET: 1580 mL    09-19-17 @ 07:01  -  09-19-17 @ 20:29  --------------------------------------------------------  IN: 1220 mL / OUT: 0 mL / NET: 1220 mL      Physical Exam:  	Gen: chronically ill appearing   	no jvd supple neck,   	Pulm: CTA B/L no rales or ronchi  	CV: RRR, S1S2; no rub  	Abd: +BS, soft, nontender/nondistended  	: No suprapubic tenderness  	LE:  no clubbing, no edema  	  LABS/STUDIES  --------------------------------------------------------------------------------              11.8   6.32  >-----------<  129      [09-19-17 @ 08:51]              35.1     131  |  95  |  23  ----------------------------<  297      [09-19-17 @ 08:41]  4.5   |  22  |  0.72        Ca     8.4     [09-19-17 @ 08:41]      Mg     2.0     [09-19-17 @ 08:41]      Phos  3.2     [09-19-17 @ 08:41]            Creatinine Trend:  SCr 0.72 [09-19 @ 08:41]  SCr 0.73 [09-18 @ 09:18]  SCr 0.67 [09-16 @ 08:48]  SCr 0.75 [09-15 @ 09:13]  SCr 0.57 [09-13 @ 21:49]    Urinalysis - [09-13-17 @ 14:12]      Color  / Appearance Clear / SG 1.030 / pH 6.5      Gluc >1000 / Ketone Negative  / Bili Negative / Urobili Negative       Blood Negative / Protein Negative / Leuk Est Negative / Nitrite Negative      RBC 0-2 / WBC 0-2 / Hyaline  / Gran  / Sq Epi  / Non Sq Epi  / Bacteria       Iron 61, TIBC 233, %sat 26      [02-16-17 @ 01:28]  Ferritin 314.7      [02-16-17 @ 01:25]  HbA1c 9.4      [09-14-17 @ 07:44]  TSH 1.50      [02-14-17 @ 08:20]  Lipid: chol 130, TG 61, HDL 51, LDL 67      [02-14-17 @ 12:27]    HBsAb Nonreact      [02-15-17 @ 12:45]  HBsAg Nonreact      [02-15-17 @ 12:45]  HBcAb Nonreact      [02-15-17 @ 12:45]

## 2017-09-19 NOTE — PROGRESS NOTE ADULT - PROBLEM SELECTOR PLAN 1
Will continue Methadone to 10 mg 6/14/22 .   Will continue  Dilaudid 1 mg IV q 6 ATC and continuing 1 mg IV q 2 PRN   Will continue Naloxone PRN   Will continue  Dexa to 4 mg BID  Patient indicates his main goal is to have appropriate symptomatic Rx.

## 2017-09-19 NOTE — PROGRESS NOTE ADULT - PROBLEM SELECTOR PLAN 1
Pain management as per palliative team, FU noted.need to transition to oral pills to get him ready for discharge.

## 2017-09-19 NOTE — PROGRESS NOTE BEHAVIORAL HEALTH - NSBHCHARTREVIEWLAB_PSY_A_CORE FT
12.8   6.0   )-----------( 118      ( 13 Sep 2017 12:56 )             37.3     09-13    137  |  105  |  10  ----------------------------<  239<H>  3.2<L>   |  23  |  0.57    Ca    7.3<L>      13 Sep 2017 21:49    TPro  10.4<H>  /  Alb  3.1<L>  /  TBili  1.2  /  DBili  x   /  AST  42<H>  /  ALT  60<H>  /  AlkPhos  388<H>  09-13
11.8   6.32  )-----------( 129      ( 19 Sep 2017 08:51 )             35.1     09-19    131<L>  |  95<L>  |  23  ----------------------------<  297<H>  4.5   |  22  |  0.72    Ca    8.4      19 Sep 2017 08:41  Phos  3.2     09-19  Mg     2.0     09-19

## 2017-09-20 LAB
ANION GAP SERPL CALC-SCNC: 10 MMOL/L — SIGNIFICANT CHANGE UP (ref 5–17)
BUN SERPL-MCNC: 23 MG/DL — SIGNIFICANT CHANGE UP (ref 7–23)
CALCIUM SERPL-MCNC: 8.5 MG/DL — SIGNIFICANT CHANGE UP (ref 8.4–10.5)
CHLORIDE SERPL-SCNC: 96 MMOL/L — SIGNIFICANT CHANGE UP (ref 96–108)
CO2 SERPL-SCNC: 27 MMOL/L — SIGNIFICANT CHANGE UP (ref 22–31)
CREAT SERPL-MCNC: 0.65 MG/DL — SIGNIFICANT CHANGE UP (ref 0.5–1.3)
GLUCOSE SERPL-MCNC: 112 MG/DL — HIGH (ref 70–99)
POTASSIUM SERPL-MCNC: 4.1 MMOL/L — SIGNIFICANT CHANGE UP (ref 3.5–5.3)
POTASSIUM SERPL-SCNC: 4.1 MMOL/L — SIGNIFICANT CHANGE UP (ref 3.5–5.3)
SODIUM SERPL-SCNC: 133 MMOL/L — LOW (ref 135–145)

## 2017-09-20 PROCEDURE — 99232 SBSQ HOSP IP/OBS MODERATE 35: CPT | Mod: GV

## 2017-09-20 RX ORDER — INSULIN LISPRO 100/ML
16 VIAL (ML) SUBCUTANEOUS
Qty: 0 | Refills: 0 | Status: DISCONTINUED | OUTPATIENT
Start: 2017-09-20 | End: 2017-09-21

## 2017-09-20 RX ORDER — HYDROMORPHONE HYDROCHLORIDE 2 MG/ML
1 INJECTION INTRAMUSCULAR; INTRAVENOUS; SUBCUTANEOUS
Qty: 0 | Refills: 0 | Status: DISCONTINUED | OUTPATIENT
Start: 2017-09-20 | End: 2017-09-22

## 2017-09-20 RX ORDER — INSULIN GLARGINE 100 [IU]/ML
34 INJECTION, SOLUTION SUBCUTANEOUS AT BEDTIME
Qty: 0 | Refills: 0 | Status: DISCONTINUED | OUTPATIENT
Start: 2017-09-20 | End: 2017-09-24

## 2017-09-20 RX ADMIN — Medication 14 UNIT(S): at 08:58

## 2017-09-20 RX ADMIN — METHADONE HYDROCHLORIDE 10 MILLIGRAM(S): 40 TABLET ORAL at 22:48

## 2017-09-20 RX ADMIN — Medication 2: at 22:49

## 2017-09-20 RX ADMIN — HYDROMORPHONE HYDROCHLORIDE 1 MILLIGRAM(S): 2 INJECTION INTRAMUSCULAR; INTRAVENOUS; SUBCUTANEOUS at 15:16

## 2017-09-20 RX ADMIN — METHADONE HYDROCHLORIDE 10 MILLIGRAM(S): 40 TABLET ORAL at 05:27

## 2017-09-20 RX ADMIN — HYDROMORPHONE HYDROCHLORIDE 1 MILLIGRAM(S): 2 INJECTION INTRAMUSCULAR; INTRAVENOUS; SUBCUTANEOUS at 13:00

## 2017-09-20 RX ADMIN — Medication 4: at 12:34

## 2017-09-20 RX ADMIN — HYDROMORPHONE HYDROCHLORIDE 1 MILLIGRAM(S): 2 INJECTION INTRAMUSCULAR; INTRAVENOUS; SUBCUTANEOUS at 05:26

## 2017-09-20 RX ADMIN — INSULIN GLARGINE 34 UNIT(S): 100 INJECTION, SOLUTION SUBCUTANEOUS at 22:48

## 2017-09-20 RX ADMIN — METHADONE HYDROCHLORIDE 10 MILLIGRAM(S): 40 TABLET ORAL at 14:32

## 2017-09-20 RX ADMIN — HYDROMORPHONE HYDROCHLORIDE 1 MILLIGRAM(S): 2 INJECTION INTRAMUSCULAR; INTRAVENOUS; SUBCUTANEOUS at 05:58

## 2017-09-20 RX ADMIN — HYDROMORPHONE HYDROCHLORIDE 1 MILLIGRAM(S): 2 INJECTION INTRAMUSCULAR; INTRAVENOUS; SUBCUTANEOUS at 11:20

## 2017-09-20 RX ADMIN — HYDROMORPHONE HYDROCHLORIDE 1 MILLIGRAM(S): 2 INJECTION INTRAMUSCULAR; INTRAVENOUS; SUBCUTANEOUS at 17:45

## 2017-09-20 RX ADMIN — HYDROMORPHONE HYDROCHLORIDE 1 MILLIGRAM(S): 2 INJECTION INTRAMUSCULAR; INTRAVENOUS; SUBCUTANEOUS at 00:08

## 2017-09-20 RX ADMIN — Medication 14 UNIT(S): at 12:34

## 2017-09-20 RX ADMIN — HYDROMORPHONE HYDROCHLORIDE 1 MILLIGRAM(S): 2 INJECTION INTRAMUSCULAR; INTRAVENOUS; SUBCUTANEOUS at 11:02

## 2017-09-20 RX ADMIN — HYDROMORPHONE HYDROCHLORIDE 1 MILLIGRAM(S): 2 INJECTION INTRAMUSCULAR; INTRAVENOUS; SUBCUTANEOUS at 17:33

## 2017-09-20 RX ADMIN — HYDROMORPHONE HYDROCHLORIDE 1 MILLIGRAM(S): 2 INJECTION INTRAMUSCULAR; INTRAVENOUS; SUBCUTANEOUS at 00:38

## 2017-09-20 RX ADMIN — Medication 4 MILLIGRAM(S): at 05:27

## 2017-09-20 RX ADMIN — Medication 10: at 17:38

## 2017-09-20 RX ADMIN — HYDROMORPHONE HYDROCHLORIDE 1 MILLIGRAM(S): 2 INJECTION INTRAMUSCULAR; INTRAVENOUS; SUBCUTANEOUS at 21:00

## 2017-09-20 RX ADMIN — HYDROMORPHONE HYDROCHLORIDE 1 MILLIGRAM(S): 2 INJECTION INTRAMUSCULAR; INTRAVENOUS; SUBCUTANEOUS at 20:43

## 2017-09-20 RX ADMIN — HYDROMORPHONE HYDROCHLORIDE 1 MILLIGRAM(S): 2 INJECTION INTRAMUSCULAR; INTRAVENOUS; SUBCUTANEOUS at 15:30

## 2017-09-20 RX ADMIN — Medication 16 UNIT(S): at 17:35

## 2017-09-20 RX ADMIN — Medication 1 MILLIGRAM(S): at 05:27

## 2017-09-20 RX ADMIN — Medication 4 MILLIGRAM(S): at 17:35

## 2017-09-20 RX ADMIN — PANTOPRAZOLE SODIUM 40 MILLIGRAM(S): 20 TABLET, DELAYED RELEASE ORAL at 05:27

## 2017-09-20 RX ADMIN — HYDROMORPHONE HYDROCHLORIDE 1 MILLIGRAM(S): 2 INJECTION INTRAMUSCULAR; INTRAVENOUS; SUBCUTANEOUS at 12:40

## 2017-09-20 RX ADMIN — Medication 1 MILLIGRAM(S): at 17:38

## 2017-09-20 NOTE — PROGRESS NOTE ADULT - PROBLEM SELECTOR PLAN 4
Patient is DNR/I  Floor SW and  to further help with the patient's psychosocial situation as well as on advising him in regards to how to complete other paperwork such as a Power of   Hospice Referral was d/w .

## 2017-09-20 NOTE — PROGRESS NOTE ADULT - SUBJECTIVE AND OBJECTIVE BOX
Patient is a 61y old  Male who presents with a chief complaint of Lethargy (15 Sep 2017 16:58)      SUBJECTIVE / OVERNIGHT EVENTS:  Still continues to have pain and is on intravenous narcotic pain medications.Review of Systems:   CONSTITUTIONAL: No fever, weight loss, or fatigue  EYES: No eye pain, visual disturbances, or discharge  ENMT:  No difficulty hearing, tinnitus, vertigo; No sinus or throat pain  NECK: No pain or stiffness  BREASTS: No pain, masses, or nipple discharge  RESPIRATORY: No cough, wheezing, chills or hemoptysis; No shortness of breath  CARDIOVASCULAR: No chest pain, palpitations, dizziness, or leg swelling  GASTROINTESTINAL: No abdominal or epigastric pain. No nausea, vomiting, or hematemesis; No diarrhea or constipation. No melena or hematochezia.  GENITOURINARY: No dysuria, frequency, hematuria, or incontinence  NEUROLOGICAL: No headaches, memory loss, loss of strength, numbness, or tremors  SKIN: No itching, burning, rashes, or lesions   LYMPH NODES: No enlarged glands  ENDOCRINE: No heat or cold intolerance; No hair loss  MUSCULOSKELETAL: No joint pain or swelling; No muscle, back, or extremity pain  PSYCHIATRIC: No depression, anxiety, mood swings, or difficulty sleeping  HEME/LYMPH: No easy bruising, or bleeding gums  ALLERY AND IMMUNOLOGIC: No hives or eczema    MEDICATIONS  (STANDING):  insulin lispro (HumaLOG) corrective regimen sliding scale   SubCutaneous at bedtime  dextrose 5%. 1000 milliLiter(s) (50 mL/Hr) IV Continuous <Continuous>  dextrose 50% Injectable 12.5 Gram(s) IV Push once  dextrose 50% Injectable 25 Gram(s) IV Push once  dextrose 50% Injectable 25 Gram(s) IV Push once  LORazepam     Tablet 1 milliGRAM(s) Oral two times a day  pantoprazole    Tablet 40 milliGRAM(s) Oral before breakfast  dexamethasone     Tablet 4 milliGRAM(s) Oral two times a day  methadone    Tablet 10 milliGRAM(s) Oral <User Schedule>  HYDROmorphone  Injectable 1 milliGRAM(s) IV Push every 6 hours  insulin glargine Injectable (LANTUS) 30 Unit(s) SubCutaneous at bedtime  insulin lispro Injectable (HumaLOG) 14 Unit(s) SubCutaneous three times a day before meals  insulin lispro (HumaLOG) corrective regimen sliding scale   SubCutaneous three times a day before meals    MEDICATIONS  (PRN):  ondansetron    Tablet 4 milliGRAM(s) Oral two times a day PRN Nausea and/or Vomiting  dextrose Gel 1 Dose(s) Oral once PRN Blood Glucose LESS THAN 70 milliGRAM(s)/deciliter  glucagon  Injectable 1 milliGRAM(s) IntraMuscular once PRN Glucose LESS THAN 70 milligrams/deciliter  naloxone Injectable 0.1 milliGRAM(s) IV Push every 3 minutes PRN Sedation or respiratory depression RR < 10  HYDROmorphone  Injectable 1 milliGRAM(s) IV Push every 2 hours PRN Breakthrough pain  LORazepam     Tablet 1 milliGRAM(s) Oral every 8 hours PRN Anxiety      PHYSICAL EXAM:  Vital Signs Last 24 Hrs  T(C): 36.6 (20 Sep 2017 12:17), Max: 36.8 (19 Sep 2017 19:56)  T(F): 97.9 (20 Sep 2017 12:17), Max: 98.3 (19 Sep 2017 19:56)  HR: 62 (20 Sep 2017 12:17) (62 - 65)  BP: 109/66 (20 Sep 2017 12:17) (109/66 - 156/80)  BP(mean): --  RR: 18 (20 Sep 2017 12:17) (18 - 18)  SpO2: 96% (20 Sep 2017 12:17) (96% - 98%)  I&O's Summary    19 Sep 2017 07:01  -  20 Sep 2017 07:00  --------------------------------------------------------  IN: 1940 mL / OUT: 0 mL / NET: 1940 mL    20 Sep 2017 07:01  -  20 Sep 2017 12:58  --------------------------------------------------------  IN: 480 mL / OUT: 0 mL / NET: 480 mL      GENERAL: NAD, well-developed  HEAD:  Atraumatic, Normocephalic  EYES: EOMI, PERRLA, conjunctiva and sclera clear  NECK: Supple, No JVD  CHEST/LUNG: Clear to auscultation bilaterally; No wheeze  HEART: Regular rate and rhythm; No murmurs, rubs, or gallops  ABDOMEN: Soft, Nontender, Nondistended; Bowel sounds present  EXTREMITIES:  2+ Peripheral Pulses, No clubbing, cyanosis, or edema  PSYCH: AAOx3  NEUROLOGY: non-focal  SKIN: No rashes or lesions    LABS:  CAPILLARY BLOOD GLUCOSE  216 (20 Sep 2017 11:24)  128 (20 Sep 2017 07:43)  180 (19 Sep 2017 21:22)  267 (19 Sep 2017 16:59)                              11.8   6.32  )-----------( 129      ( 19 Sep 2017 08:51 )             35.1     09-20    133<L>  |  96  |  23  ----------------------------<  112<H>  4.1   |  27  |  0.65    Ca    8.5      20 Sep 2017 09:12  Phos  3.2     09-19  Mg     2.0     09-19                RADIOLOGY & ADDITIONAL TESTS:    Imaging Personally Reviewed:    Consultant(s) Notes Reviewed:    62 y/o w/m h/o liver CA, pancreatitis, Hep-C , HTN ,IDDM. was at Hospice admitted with delirium  1- hyponatrermia  likely in setting of cirrhosis and increase po fluid intake.   in addition when corrected for hyperglycemia today na is only borderline low. no intervention at present. if worsens again will consider fluid restriction.   pain control and anti-emetics as needed given pain and Nausea can trigger siadh as well   Care Discussed with Consultants/Other Providers:

## 2017-09-20 NOTE — CHART NOTE - NSCHARTNOTEFT_GEN_A_CORE
Upon Nutritional Assessment by the Registered Dietitian your patient was determined to meet criteria / has evidence of the following diagnosis/diagnoses:          [ ]  Mild Protein Calorie Malnutrition        [ ]  Moderate Protein Calorie Malnutrition        [ x] Severe Protein Calorie Malnutrition        [ ] Unspecified Protein Calorie Malnutrition        [ ] Underweight / BMI <19        [ ] Morbid Obesity / BMI > 40      Findings as based on:  [x ] Comprehensive nutrition assessment   [ ] Nutrition Focused Physical Exam  [ x ] Other:  related to unintentional weight loss, BMI=18, catabolic illness      Nutrition Plan/Recommendations:  continue glucerna 1.2 2x daily     Assist with food preferences; Monitor/encourage PO intake as tolerated.     PROVIDER Section:     By signing this assessment you are acknowledging and agree with the diagnosis/diagnoses assigned by the Registered Dietitian    Comments:

## 2017-09-20 NOTE — PROGRESS NOTE ADULT - PROBLEM SELECTOR PLAN 3
ISS, uncontrolled diabetes Could've contributed to his altered mental status when he was admitted, it has resolved now.

## 2017-09-20 NOTE — PROGRESS NOTE ADULT - SUBJECTIVE AND OBJECTIVE BOX
DIABETES FOLLOW UP NOTE: Saw pt earlier today  INTERVAL HX: 62 y/o M w/h/o uncontrolled type 2 DM in hospice care secondary to liver ca/pancreatitis/Hep C. Here from hospice with AMS and hyperglycemic. Pt reports feeling  better and tolerating POs.  Glycemic control improving slowly with FBG at goal after getting total of 40 units yesterday (10+30).. However, ac lunch BG ac meals remians >200s. Remains on Dexa 4mg bid.     Review of Systems: 'better today"  Respiratory: No SOB, no cough  GI: No nausea, vomiting, variable abdominal pain due to ca  Endocrine: no polyuria, polydipsia or S&Sx of hypoglycemia    Allergies    No Known Allergies    Intolerances      MEDICATIONS:  insulin lispro (HumaLOG) corrective regimen sliding scale   SubCutaneous three times a day before meals  insulin lispro (HumaLOG) corrective regimen sliding scale   SubCutaneous at bedtime  insulin glargine Injectable (LANTUS)30 Unit(s) SubCutaneous at bedtime  insulin lispro Injectable (HumaLOG) 14 Unit(s) SubCutaneous three times a day before meals  dexamethasone     Tablet 4 milliGRAM(s) Oral two times a day    PHYSICAL EXAM:  Vital Signs Last 24 Hrs  T(C): 36.6 (09-20-17 @ 12:17), Max: 36.8 (09-19-17 @ 19:56)  T(F): 97.9 (09-20-17 @ 12:17), Max: 98.3 (09-19-17 @ 19:56)  HR: 62 (09-20-17 @ 12:17) (62 - 65)  BP: 109/66 (09-20-17 @ 12:17) (109/66 - 156/80)  BP(mean): --  RR: 18 (09-20-17 @ 12:17) (18 - 18)  SpO2: 96% (09-20-17 @ 12:17) (96% - 98%)  GENERAL: NAD  Abdomen: Enlarged liver with mild tenderness and hard at touch around liver.   Extremities: Warm, no edema noted in all 4 exts  NEURO: A&Ox3. Able to answer all questions.    LABS:  CAPILLARY BLOOD GLUCOSE  216 (20 Sep 2017 11:24)  128 (20 Sep 2017 07:43)  180 (19 Sep 2017 21:22)  267 (19 Sep 2017 16:59)  278 (19 Sep 2017 12:21)  263 (19 Sep 2017 07:32)  265 (18 Sep 2017 21:56)  332 (18 Sep 2017 17:11)  275 (18 Sep 2017 12:15)  222 (18 Sep 2017 07:23)  195 (17 Sep 2017 20:45)  218 (17 Sep 2017 17:28)                                     11.8   6.32  )-----------( 129      ( 19 Sep 2017 08:51 )             35.1     09-20    133<L>  |  96  |  23  ----------------------------<  112<H>  4.1   |  27  |  0.65    Ca    8.5      20 Sep 2017 09:12  Phos  3.2     09-19  Mg     2.0     09-19      Hemoglobin A1C, Whole Blood: 9.4 % <H> [4.0 - 5.6] (09-14-17 @ 07:44)

## 2017-09-20 NOTE — PROGRESS NOTE ADULT - SUBJECTIVE AND OBJECTIVE BOX
HPI:  62 y/o w/m h/o liver CA, pancreatitis, Hep-C , HTN ,IDDM. was at inpatient Hospice and was found wandering in another patients room and smearing feces according to EMS. As [facility] nurse liaison, patient was found to have Temazepam and MS Contin that he appeared to be taking without informing the providers at inpatient hospice.  At the same time the patient was on a Dilaudid PCA and as per hospice, the patient may have become Delirious because of intoxication due to opioids and benzodiazepines.     We are currently f/u for symptoms and resources.     Although he indicates he is having persistent pain, he states that he is more comfortable and that he was able to walk today. He indicated he knows he is dying but that he would like to spent his last days in a better place than the hospital and that he would like to go home if possible. He continues to use less PRN medications since I changed his regimen. He used only 4 PRN over the last 24 hours. He also indicates anxiety that is recurrent.       PERTINENT PMH REVIEWED:  [x ] YES [ ] NO           SOCIAL HISTORY:   Significant other/partner:               Children:    2               Baptist/Spirituality: Nondenominational   Substance hx:  [ ] YES   [x] NO                   Tobacco hx:  [ x] YES  [ ] NO                       Alcohol hx: [ ] YES  [ ] NO         Home Opioid hx:  [x ] YES  [ ] NO   Living Situation: [ x] Home  [ ] Long term care  [ ] Rehab [ ] Other  Patient indicated history of polysubstance abuse (Benzodiazepines and when he was young Cocaine)     FAMILY HISTORY:  No pertinent family history in first degree relatives    [ ] Family history non-contributory     BASELINE (I)ADLs (prior to admission):  [place]: [ ] total  [x ] moderate [ ] dependent    ADVANCE DIRECTIVES:    DNR [x ] YES [ ] NO                            [x ] Completed  MOLST  [ ] YES [x ] NO                      [ ] Completed  Health Care Proxy [ x] YES  [ ] NO   [ ] Completed.   Living Will  [ ] YES [ ] NO             [ ] Surrogate  [x ] HCP  [ ] Guardian:        Nevaeh Serrano                                                          Phone#:  [phone]    Allergies    No Known Allergies    Intolerances    MEDICATIONS  (STANDING):  insulin lispro (HumaLOG) corrective regimen sliding scale   SubCutaneous at bedtime  dextrose 5%. 1000 milliLiter(s) (50 mL/Hr) IV Continuous <Continuous>  dextrose 50% Injectable 12.5 Gram(s) IV Push once  dextrose 50% Injectable 25 Gram(s) IV Push once  dextrose 50% Injectable 25 Gram(s) IV Push once  LORazepam     Tablet 1 milliGRAM(s) Oral two times a day  pantoprazole    Tablet 40 milliGRAM(s) Oral before breakfast  dexamethasone     Tablet 4 milliGRAM(s) Oral two times a day  methadone    Tablet 10 milliGRAM(s) Oral <User Schedule>  HYDROmorphone  Injectable 1 milliGRAM(s) IV Push every 6 hours  insulin lispro (HumaLOG) corrective regimen sliding scale   SubCutaneous three times a day before meals  insulin glargine Injectable (LANTUS) 34 Unit(s) SubCutaneous at bedtime  insulin lispro Injectable (HumaLOG) 16 Unit(s) SubCutaneous three times a day before meals    MEDICATIONS  (PRN):  ondansetron    Tablet 4 milliGRAM(s) Oral two times a day PRN Nausea and/or Vomiting  dextrose Gel 1 Dose(s) Oral once PRN Blood Glucose LESS THAN 70 milliGRAM(s)/deciliter  glucagon  Injectable 1 milliGRAM(s) IntraMuscular once PRN Glucose LESS THAN 70 milligrams/deciliter  naloxone Injectable 0.1 milliGRAM(s) IV Push every 3 minutes PRN Sedation or respiratory depression RR < 10  LORazepam     Tablet 1 milliGRAM(s) Oral every 8 hours PRN Anxiety  HYDROmorphone  Injectable 1 milliGRAM(s) IV Push every 2 hours PRN Breakthrough pain              PRESENT SYMPTOMS:  Source: [x ] Patient   [ ] Family   [ ] Team     Pain:                        [ ] No [x ] Yes             [ ] Mild [x ] Moderate  to Severe    Onset - chronic   Location - RUQ and Epigastric region   Duration - Constant   Character - Cramping, Stabbing    Alleviating/Aggravating - Dilaudid   Radiation - band like towards LUQ   Timing - none      Dyspnea:                [x ] No [ ] Yes             [ ] Mild [ ] Moderate [ ] Severe    Anxiety:                  [ ] No [x ] Yes             [ ] Mild [x ] Moderate [ ] Severe    Fatigue:                  [ ] No [x ] Yes             [ ] Mild [x ] Moderate [ ] Severe    Nausea:                  [x ] No [ ] Yes             [ ] Mild [ ] Moderate [ ] Severe    Loss of appetite:   [ ] No [x ] Yes             [x ] Mild [ ] Moderate [ ] Severe    Constipation:        [x ] No [ ] Yes             [ ] Mild [ ] Moderate [ ] Severe    Other Symptoms:  [x ] All other review of systems negative   [ ] Unable to obtain due to poor mentation     Karnofsky Performance Score/Palliative Performance Status Version 2:  30-40       %    PHYSICAL EXAM:    Vital Signs Last 24 Hrs  T(C): 36.6 (20 Sep 2017 12:17), Max: 36.8 (19 Sep 2017 19:56)  T(F): 97.9 (20 Sep 2017 12:17), Max: 98.3 (19 Sep 2017 19:56)  HR: 62 (20 Sep 2017 12:17) (62 - 65)  BP: 109/66 (20 Sep 2017 12:17) (109/66 - 156/80)  BP(mean): --  RR: 18 (20 Sep 2017 12:17) (18 - 18)  SpO2: 96% (20 Sep 2017 12:17) (96% - 98%)      General:  [ x ]  Alert, Oriented x 3. Cachexia     HEENT:  [ ] Normal   [x ] Dry mouth   [ ] ET Tube    [ ] Trach  [ ] Oral lesions    Lungs:   [x ] Clear [ ] Tachypnea  [ ] Audible excessive secretions   [ ] Rhonchi        [ ] Right [ ] Left [ ] Bilateral  [ ] Crackles        [ ] Right [ ] Left [ ] Bilateral  [ ] Wheezing     [ ] Right [ ] Left [ ] Bilateral    Cardiovascular:  S1, S2. No S3. No murmurs     Abdomen: [ ] Soft  [x ] Distended   [x ] +BS x 4  [ ] Non tender [x ] Tender over RUQ, Epigastric region, and maci-umbilical area [ ]PEG   [ ]OGT/ NGT   Last BM:   9/20    Genitourinary: [x ] Normal [ ] Incontinent   [ ] Oliguria/Anuria   [ ] Alatorre    Musculoskeletal:  [ ] Normal   [x ] Weakness  [ ] Bedbound/Wheelchair bound [ ] Edema    Neurological: [x ] No focal deficits  [ ] Cognitive impairment  [ ] Dysphagia [ ] Dysarthria [ ] Paresis [ ] Other     Skin: [x ] Normal   [ ] Pressure ulcer(s)                  [ ] Rash    LABS:                                                                                       11.8   6.32  )-----------( 129      ( 19 Sep 2017 08:51 )             35.1   09-20    133<L>  |  96  |  23  ----------------------------<  112<H>  4.1   |  27  |  0.65    Ca    8.5      20 Sep 2017 09:12  Phos  3.2     09-19  Mg     2.0     09-19      Shock: [ ] Septic [ ] Cardiogenic [ ] Neurologic [ ] Hypovolemic  Vasopressors x   Inotrophs x     Protein Calorie Malnutrition: [ ] Mild [ ] Moderate [ ] Severe    Oral Intake: [ ] Unable/mouth care only [ ] Minimal [ ] Moderate [ ] Full Capability  Diet: [ ] NPO [ ] Tube feeds [ ] TPN [ ] Other     RADIOLOGY & ADDITIONAL STUDIES:  < from: MRI Abdomen w/wo Cont (06.17.17 @ 08:47) >  EXAM:  MRI ABDOMEN WOW CON        PROCEDURE DATE:  06/17/2017  IMPRESSION: Cirrhosis.    Dominant liver mass in segments 5/8 is unchanged in size from most recent   CT dated May 23, 2017. Extensive right portal vein tumor invasion   extending just into the left and main portal vein is unchanged.    Another focus of hepatocellular carcinoma in segment 2 is mildly   increased in size compared with the MRI from April 24, 2017.    < end of copied text >    REFERRALS:   [ ] Chaplaincy  [ ] Hospice  [ ] Child Life  [ ] Social Work  [ ] Case management [ ] Holistic Therapy     Dominick Wilkerson MD [phone]  Assessment and Plan:

## 2017-09-20 NOTE — PROGRESS NOTE ADULT - PROBLEM SELECTOR PLAN 1
Will continue Methadone 10 mg 6/14/22 .   Will continue  Dilaudid 1 mg IV q 6 ATC and continuing 1 mg IV q 2 PRN ( If it is possible to discharge the patient home, may consider DC Dilaudid IV and starting Dilaudid 6 mg PO q 6 ATC  and 6 mg q 4h PRN.   Will continue Naloxone PRN   Will continue  Dexa to 4 mg BID  Patient indicates his main goal is to have appropriate symptomatic Rx.

## 2017-09-20 NOTE — DIETITIAN INITIAL EVALUATION ADULT. - OTHER INFO
nutrition consult for  elevated HgbA1C 9.4%.  Pt previously at hospice center, discharged, now looking for new Hospice. pt states he takes levimir for DM control but does not check fingersticks regularly. Pt lives in  a house  where there is a woman who is a cook. Pt state he cannot eat well because his stomach pain is so intense. Now on pain meds, pt awaiting new hospice placement. Admitted with AMS, pt states he cannot tolerate chemotherapy. Pt receiving glucerna shakes 2x/day, requesting to receive  "on the outside", stating it is too expensive.  pt followed by

## 2017-09-20 NOTE — PROGRESS NOTE ADULT - ASSESSMENT
60 y/o M w/h/o uncontrolled type 2 DM in hospice care secondary to liver ca/pancreatitis/Hep C. Here from hospice with AMS and hyperglycemiA. Pt on steroids with worsening hyperglycemia. Pt tolerating POs.  Will increase insulin doses further due to hyperglycemia. Per primary awaiting for discharge placement (Hospice facility versus home hospice). If pt going home on present steroid doses he will at least need to take 70/30 mixed insulin bid. Pt states he has been doing insulin pen for several years but need to evaluate his competency after having AMS.

## 2017-09-20 NOTE — DIETITIAN INITIAL EVALUATION ADULT. - ENERGY NEEDS
IBW= 178  lbs +/- 10%  Chart review: followed by endocrinologist: lantus increased HS, humolog premeal   insulin  increased.  pt is DNR, palliative consult ordered for GOC, chronic pain meds with dependence noted

## 2017-09-20 NOTE — PROGRESS NOTE ADULT - PROBLEM SELECTOR PLAN 1
-Test BG ac and hs  -Increase Lantus dose 36 units qhs.   -Increase meal time Humalog insulin to 16 units  -Continue Humalog correction scales ac and hs moderate dose  -Contact DM team with any changes on Dexa doses!!!  -Plan to discharge on Levemir (home insulin) and prandin ac meals if in low dose of Dexa. If going home on Dexa 4 bid pt will need Novolin 70/30 insulin bid instead.   -Insulin doses TBD depending on discharge disposition, insulin requirements and steroid doses  -Staff to evaluate pt's ability to use insulin pen.  -Plan discussed with pt/team. Will follow. Goal is to prevent hypoglycemia or persistent hypoglycemia.  Contact info: [phone] (24/7). pager [phone]

## 2017-09-21 PROCEDURE — 99232 SBSQ HOSP IP/OBS MODERATE 35: CPT

## 2017-09-21 RX ORDER — INSULIN LISPRO 100/ML
18 VIAL (ML) SUBCUTANEOUS
Qty: 0 | Refills: 0 | Status: DISCONTINUED | OUTPATIENT
Start: 2017-09-21 | End: 2017-09-24

## 2017-09-21 RX ADMIN — HYDROMORPHONE HYDROCHLORIDE 1 MILLIGRAM(S): 2 INJECTION INTRAMUSCULAR; INTRAVENOUS; SUBCUTANEOUS at 01:10

## 2017-09-21 RX ADMIN — METHADONE HYDROCHLORIDE 10 MILLIGRAM(S): 40 TABLET ORAL at 22:53

## 2017-09-21 RX ADMIN — HYDROMORPHONE HYDROCHLORIDE 1 MILLIGRAM(S): 2 INJECTION INTRAMUSCULAR; INTRAVENOUS; SUBCUTANEOUS at 16:14

## 2017-09-21 RX ADMIN — METHADONE HYDROCHLORIDE 10 MILLIGRAM(S): 40 TABLET ORAL at 06:02

## 2017-09-21 RX ADMIN — Medication 16 UNIT(S): at 13:13

## 2017-09-21 RX ADMIN — HYDROMORPHONE HYDROCHLORIDE 1 MILLIGRAM(S): 2 INJECTION INTRAMUSCULAR; INTRAVENOUS; SUBCUTANEOUS at 04:45

## 2017-09-21 RX ADMIN — Medication 1 MILLIGRAM(S): at 18:40

## 2017-09-21 RX ADMIN — Medication 1 MILLIGRAM(S): at 06:02

## 2017-09-21 RX ADMIN — Medication 18 UNIT(S): at 18:07

## 2017-09-21 RX ADMIN — HYDROMORPHONE HYDROCHLORIDE 1 MILLIGRAM(S): 2 INJECTION INTRAMUSCULAR; INTRAVENOUS; SUBCUTANEOUS at 05:00

## 2017-09-21 RX ADMIN — HYDROMORPHONE HYDROCHLORIDE 1 MILLIGRAM(S): 2 INJECTION INTRAMUSCULAR; INTRAVENOUS; SUBCUTANEOUS at 21:00

## 2017-09-21 RX ADMIN — Medication 16 UNIT(S): at 09:09

## 2017-09-21 RX ADMIN — Medication 6: at 13:13

## 2017-09-21 RX ADMIN — HYDROMORPHONE HYDROCHLORIDE 1 MILLIGRAM(S): 2 INJECTION INTRAMUSCULAR; INTRAVENOUS; SUBCUTANEOUS at 18:07

## 2017-09-21 RX ADMIN — Medication 4 MILLIGRAM(S): at 06:02

## 2017-09-21 RX ADMIN — HYDROMORPHONE HYDROCHLORIDE 1 MILLIGRAM(S): 2 INJECTION INTRAMUSCULAR; INTRAVENOUS; SUBCUTANEOUS at 18:37

## 2017-09-21 RX ADMIN — PANTOPRAZOLE SODIUM 40 MILLIGRAM(S): 20 TABLET, DELAYED RELEASE ORAL at 06:02

## 2017-09-21 RX ADMIN — Medication 4: at 18:05

## 2017-09-21 RX ADMIN — HYDROMORPHONE HYDROCHLORIDE 1 MILLIGRAM(S): 2 INJECTION INTRAMUSCULAR; INTRAVENOUS; SUBCUTANEOUS at 20:32

## 2017-09-21 RX ADMIN — HYDROMORPHONE HYDROCHLORIDE 1 MILLIGRAM(S): 2 INJECTION INTRAMUSCULAR; INTRAVENOUS; SUBCUTANEOUS at 00:49

## 2017-09-21 RX ADMIN — INSULIN GLARGINE 34 UNIT(S): 100 INJECTION, SOLUTION SUBCUTANEOUS at 22:53

## 2017-09-21 RX ADMIN — HYDROMORPHONE HYDROCHLORIDE 1 MILLIGRAM(S): 2 INJECTION INTRAMUSCULAR; INTRAVENOUS; SUBCUTANEOUS at 13:00

## 2017-09-21 RX ADMIN — Medication 4 MILLIGRAM(S): at 18:40

## 2017-09-21 RX ADMIN — HYDROMORPHONE HYDROCHLORIDE 1 MILLIGRAM(S): 2 INJECTION INTRAMUSCULAR; INTRAVENOUS; SUBCUTANEOUS at 15:44

## 2017-09-21 RX ADMIN — HYDROMORPHONE HYDROCHLORIDE 1 MILLIGRAM(S): 2 INJECTION INTRAMUSCULAR; INTRAVENOUS; SUBCUTANEOUS at 12:45

## 2017-09-21 RX ADMIN — HYDROMORPHONE HYDROCHLORIDE 1 MILLIGRAM(S): 2 INJECTION INTRAMUSCULAR; INTRAVENOUS; SUBCUTANEOUS at 10:04

## 2017-09-21 RX ADMIN — HYDROMORPHONE HYDROCHLORIDE 1 MILLIGRAM(S): 2 INJECTION INTRAMUSCULAR; INTRAVENOUS; SUBCUTANEOUS at 09:49

## 2017-09-21 NOTE — PROGRESS NOTE ADULT - SUBJECTIVE AND OBJECTIVE BOX
Patient is a 61y old  Male who presents with a chief complaint of Lethargy (15 Sep 2017 16:58)      SUBJECTIVE / OVERNIGHT EVENTS:  Awake and alert. Pain +  Review of Systems:   CONSTITUTIONAL: No fever, weight loss, or fatigue  EYES: No eye pain, visual disturbances, or discharge  ENMT:  No difficulty hearing, tinnitus, vertigo; No sinus or throat pain  NECK: No pain or stiffness  BREASTS: No pain, masses, or nipple discharge  RESPIRATORY: No cough, wheezing, chills or hemoptysis; No shortness of breath  CARDIOVASCULAR: No chest pain, palpitations, dizziness, or leg swelling  GASTROINTESTINAL: No abdominal or epigastric pain. No nausea, vomiting, or hematemesis; No diarrhea or constipation. No melena or hematochezia.  GENITOURINARY: No dysuria, frequency, hematuria, or incontinence  NEUROLOGICAL: No headaches, memory loss, loss of strength, numbness, or tremors  SKIN: No itching, burning, rashes, or lesions   LYMPH NODES: No enlarged glands  ENDOCRINE: No heat or cold intolerance; No hair loss  MUSCULOSKELETAL: No joint pain or swelling; No muscle, back, or extremity pain  PSYCHIATRIC: No depression, anxiety, mood swings, or difficulty sleeping  HEME/LYMPH: No easy bruising, or bleeding gums  ALLERY AND IMMUNOLOGIC: No hives or eczema    MEDICATIONS  (STANDING):  insulin lispro (HumaLOG) corrective regimen sliding scale   SubCutaneous at bedtime  dextrose 5%. 1000 milliLiter(s) (50 mL/Hr) IV Continuous <Continuous>  dextrose 50% Injectable 12.5 Gram(s) IV Push once  dextrose 50% Injectable 25 Gram(s) IV Push once  dextrose 50% Injectable 25 Gram(s) IV Push once  LORazepam     Tablet 1 milliGRAM(s) Oral two times a day  pantoprazole    Tablet 40 milliGRAM(s) Oral before breakfast  dexamethasone     Tablet 4 milliGRAM(s) Oral two times a day  methadone    Tablet 10 milliGRAM(s) Oral <User Schedule>  HYDROmorphone  Injectable 1 milliGRAM(s) IV Push every 6 hours  insulin lispro (HumaLOG) corrective regimen sliding scale   SubCutaneous three times a day before meals  insulin glargine Injectable (LANTUS) 34 Unit(s) SubCutaneous at bedtime  insulin lispro Injectable (HumaLOG) 16 Unit(s) SubCutaneous three times a day before meals    MEDICATIONS  (PRN):  ondansetron    Tablet 4 milliGRAM(s) Oral two times a day PRN Nausea and/or Vomiting  dextrose Gel 1 Dose(s) Oral once PRN Blood Glucose LESS THAN 70 milliGRAM(s)/deciliter  glucagon  Injectable 1 milliGRAM(s) IntraMuscular once PRN Glucose LESS THAN 70 milligrams/deciliter  naloxone Injectable 0.1 milliGRAM(s) IV Push every 3 minutes PRN Sedation or respiratory depression RR < 10  LORazepam     Tablet 1 milliGRAM(s) Oral every 8 hours PRN Anxiety  HYDROmorphone  Injectable 1 milliGRAM(s) IV Push every 2 hours PRN Breakthrough pain      PHYSICAL EXAM:  Vital Signs Last 24 Hrs  T(C): 36.7 (21 Sep 2017 04:21), Max: 36.7 (20 Sep 2017 20:35)  T(F): 98 (21 Sep 2017 04:21), Max: 98.1 (20 Sep 2017 20:35)  HR: 60 (21 Sep 2017 04:21) (60 - 62)  BP: 128/68 (21 Sep 2017 04:21) (109/66 - 128/68)  BP(mean): --  RR: 18 (21 Sep 2017 04:21) (18 - 18)  SpO2: 97% (21 Sep 2017 04:21) (96% - 97%)  I&O's Summary    20 Sep 2017 07:01  -  21 Sep 2017 07:00  --------------------------------------------------------  IN: 1680 mL / OUT: 0 mL / NET: 1680 mL    21 Sep 2017 07:01  -  21 Sep 2017 11:29  --------------------------------------------------------  IN: 240 mL / OUT: 0 mL / NET: 240 mL      GENERAL: NAD, well-developed  HEAD:  Atraumatic, Normocephalic  EYES: EOMI, PERRLA, conjunctiva and sclera clear  NECK: Supple, No JVD  CHEST/LUNG: Clear to auscultation bilaterally; No wheeze  HEART: Regular rate and rhythm; No murmurs, rubs, or gallops  ABDOMEN: Soft, Nontender, Nondistended; Bowel sounds present  EXTREMITIES:  2+ Peripheral Pulses, No clubbing, cyanosis, or edema  PSYCH: AAOx3  NEUROLOGY: non-focal  SKIN: No rashes or lesions    LABS:  CAPILLARY BLOOD GLUCOSE  141 (21 Sep 2017 07:54)  252 (20 Sep 2017 20:58)  385 (20 Sep 2017 17:40)          09-20    133<L>  |  96  |  23  ----------------------------<  112<H>  4.1   |  27  |  0.65    Ca    8.5      20 Sep 2017 09:12                RADIOLOGY & ADDITIONAL TESTS:    Imaging Personally Reviewed:    Consultant(s) Notes Reviewed:    Palliative and Renal  Care Discussed with Consultants/Other Providers:

## 2017-09-21 NOTE — PROGRESS NOTE ADULT - PROBLEM SELECTOR PLAN 1
-test BG AC/HS  -c/w Lantus 34 units QHS  -Increase humalog 18 units AC meals  -c/w Humalog moderate correction scale AC and Mod HS scale.   -contact endo team if changes made to steroid regimen  -Plan to discharge on Levemir (home insulin) and prandin ac meals if in low dose of Dexa. If going home on Dexa 4 bid pt will need Novolin 70/30 insulin bid instead.   -Insulin doses TBD depending on discharge disposition, insulin requirements and steroid doses  -Staff to evaluate pt's ability to use insulin pen.  -Plan discussed with pt/team. Will follow. Goal is to prevent hypoglycemia or persistent hypoglycemia.  Contact info: [phone] (24/7)

## 2017-09-21 NOTE — PHYSICAL THERAPY INITIAL EVALUATION ADULT - PERTINENT HX OF CURRENT PROBLEM, REHAB EVAL
Pt is a 62 y/o M h/o liver CA, pancreatitis, Hep-C , HTN ,IDDM. was at Hospice and found wandering in another patients room and smearing feces according to EMS. He c/o abdominal pain which he admits has been there for at least one month. He was on PCA pain pump at hospice and was found to be taking PO morphine in addition, which was not known to the staff at hospice. They suspect that he has overdosed on opiates causing delirium. He reports he was in another patients room because he was thirsty.

## 2017-09-21 NOTE — PHYSICAL THERAPY INITIAL EVALUATION ADULT - ADDITIONAL COMMENTS
Pt admitted with AMS and hyperglycemia. Pt on steroids with worsening hyperglycemia. Pt tolerating POs. Will increase insulin doses further due to hyperglycemia. Per primary awaiting for discharge placement (hospice facility versus home hospice). Pt admitted with delirium in the setting of prescription medication abuse.

## 2017-09-21 NOTE — PROGRESS NOTE ADULT - ASSESSMENT
62 y/o M w/h/o uncontrolled type 2 DM in hospice care secondary to liver ca/pancreatitis/Hep C. Here from hospice with AMS and hyperglycemiA. Pt on steroids with worsening hyperglycemia. Pt tolerating POs.  Will increase insulin doses further due to hyperglycemia. Per primary awaiting for discharge placement (Hospice facility versus home hospice). If pt going home on present steroid doses he will at least need to take 70/30 mixed insulin bid. Pt states he has been doing insulin pen for several years but need to evaluate his competency after having AMS. Unclear of discharge plan at this time pending hospice acceptance.

## 2017-09-21 NOTE — PHYSICAL THERAPY INITIAL EVALUATION ADULT - IMPAIRMENTS CONTRIBUTING TO GAIT DEVIATIONS, PT EVAL
narrow base of support/decreased strength/Pt unsteady without AD, ambualted with narrow SALLY, decreased bridgette./impaired balance

## 2017-09-21 NOTE — PROGRESS NOTE ADULT - SUBJECTIVE AND OBJECTIVE BOX
Diabetes Follow up note:  Interval Hx:  62 y/o M w/h/o uncontrolled type 2 DM in hospice care secondary to liver ca/pancreatitis/Hep C. Here from hospice with AMS and hyperglycemic. Pt seen at bedside. Tolerating diet but c/o abd pain. BG values remain elevated 200-300's before lunch, dinner and bedtime.     Review of Systems:  GI: Tolerating POs without any N/V/D. + abd pain.   CV: No CP/SOB  ENDO: No S&Sx of hypoglycemia. Denies polydipsia, polyuria, polyphagia.   MEDS:  insulin lispro (HumaLOG) corrective regimen sliding scale   SubCutaneous at bedtime  dexamethasone     Tablet 4 milliGRAM(s) Oral two times a day  insulin lispro (HumaLOG) corrective regimen sliding scale   SubCutaneous three times a day before meals  insulin glargine Injectable (LANTUS) 34 Unit(s) SubCutaneous at bedtime  insulin lispro Injectable (HumaLOG) 16 Unit(s) SubCutaneous three times a day before meals      Allergies    No Known Allergies        PE:  General: Male lying in bed. NAD  Vital Signs Last 24 Hrs  T(C): 36.5 (21 Sep 2017 12:21), Max: 36.7 (20 Sep 2017 20:35)  T(F): 97.7 (21 Sep 2017 12:21), Max: 98.1 (20 Sep 2017 20:35)  HR: 72 (21 Sep 2017 12:21) (60 - 72)  BP: 128/69 (21 Sep 2017 12:21) (121/64 - 128/69)  BP(mean): --  RR: 18 (21 Sep 2017 12:21) (18 - 18)  SpO2: 98% (21 Sep 2017 12:21) (97% - 98%)  Abd: Soft, Tender. ND,   Extremities: Warm. no edema  Neuro: A&O X3    LABS:  CAPILLARY BLOOD GLUCOSE  291 (09-21 @ 12:21)  141 (09-21 @ 07:54)  252 (09-20 @ 20:58)  385 (09-20 @ 17:40)  216 (09-20 @ 11:24)  128 (09-20 @ 07:43)  180 (09-19 @ 21:22)  267 (09-19 @ 16:59)  278 (09-19 @ 12:21)  263 (09-19 @ 07:32)  265 (09-18 @ 21:56)  332 (09-18 @ 17:11)          09-20    133<L>  |  96  |  23  ----------------------------<  112<H>  4.1   |  27  |  0.65    Ca    8.5      20 Sep 2017 09:12            Hemoglobin A1C, Whole Blood: 9.4 % <H> [4.0 - 5.6] (09-14-17 @ 07:44)            Contact number: chelsea [phone] or [phone]

## 2017-09-22 LAB
ALBUMIN SERPL ELPH-MCNC: 3.1 G/DL — LOW (ref 3.3–5)
ALP SERPL-CCNC: 221 U/L — HIGH (ref 40–120)
ALT FLD-CCNC: 58 U/L RC — HIGH (ref 10–45)
AMMONIA BLD-MCNC: 80 UMOL/L — HIGH (ref 11–55)
ANION GAP SERPL CALC-SCNC: 9 MMOL/L — SIGNIFICANT CHANGE UP (ref 5–17)
AST SERPL-CCNC: 53 U/L — HIGH (ref 10–40)
BILIRUB SERPL-MCNC: 0.5 MG/DL — SIGNIFICANT CHANGE UP (ref 0.2–1.2)
BUN SERPL-MCNC: 30 MG/DL — HIGH (ref 7–23)
CALCIUM SERPL-MCNC: 8.7 MG/DL — SIGNIFICANT CHANGE UP (ref 8.4–10.5)
CHLORIDE SERPL-SCNC: 96 MMOL/L — SIGNIFICANT CHANGE UP (ref 96–108)
CO2 SERPL-SCNC: 29 MMOL/L — SIGNIFICANT CHANGE UP (ref 22–31)
CREAT SERPL-MCNC: 0.76 MG/DL — SIGNIFICANT CHANGE UP (ref 0.5–1.3)
GLUCOSE SERPL-MCNC: 298 MG/DL — HIGH (ref 70–99)
HCT VFR BLD CALC: 37 % — LOW (ref 39–50)
HGB BLD-MCNC: 12.6 G/DL — LOW (ref 13–17)
MCHC RBC-ENTMCNC: 34 GM/DL — SIGNIFICANT CHANGE UP (ref 32–36)
MCHC RBC-ENTMCNC: 34.6 PG — HIGH (ref 27–34)
MCV RBC AUTO: 102 FL — HIGH (ref 80–100)
PLATELET # BLD AUTO: 146 K/UL — LOW (ref 150–400)
POTASSIUM SERPL-MCNC: 4.4 MMOL/L — SIGNIFICANT CHANGE UP (ref 3.5–5.3)
POTASSIUM SERPL-SCNC: 4.4 MMOL/L — SIGNIFICANT CHANGE UP (ref 3.5–5.3)
PROT SERPL-MCNC: 8.2 G/DL — SIGNIFICANT CHANGE UP (ref 6–8.3)
RBC # BLD: 3.64 M/UL — LOW (ref 4.2–5.8)
RBC # FLD: 15 % — HIGH (ref 10.3–14.5)
SODIUM SERPL-SCNC: 134 MMOL/L — LOW (ref 135–145)
WBC # BLD: 8.8 K/UL — SIGNIFICANT CHANGE UP (ref 3.8–10.5)
WBC # FLD AUTO: 8.8 K/UL — SIGNIFICANT CHANGE UP (ref 3.8–10.5)

## 2017-09-22 PROCEDURE — 99233 SBSQ HOSP IP/OBS HIGH 50: CPT

## 2017-09-22 PROCEDURE — 99232 SBSQ HOSP IP/OBS MODERATE 35: CPT | Mod: GV

## 2017-09-22 RX ORDER — LACTULOSE 10 G/15ML
20 SOLUTION ORAL
Qty: 0 | Refills: 0 | Status: DISCONTINUED | OUTPATIENT
Start: 2017-09-22 | End: 2017-09-28

## 2017-09-22 RX ORDER — OLANZAPINE 15 MG/1
5 TABLET, FILM COATED ORAL EVERY 12 HOURS
Qty: 0 | Refills: 0 | Status: DISCONTINUED | OUTPATIENT
Start: 2017-09-22 | End: 2017-09-28

## 2017-09-22 RX ORDER — HYDROMORPHONE HYDROCHLORIDE 2 MG/ML
1 INJECTION INTRAMUSCULAR; INTRAVENOUS; SUBCUTANEOUS
Qty: 0 | Refills: 0 | Status: DISCONTINUED | OUTPATIENT
Start: 2017-09-22 | End: 2017-09-26

## 2017-09-22 RX ORDER — HYDROMORPHONE HYDROCHLORIDE 2 MG/ML
0.5 INJECTION INTRAMUSCULAR; INTRAVENOUS; SUBCUTANEOUS EVERY 6 HOURS
Qty: 0 | Refills: 0 | Status: DISCONTINUED | OUTPATIENT
Start: 2017-09-22 | End: 2017-09-26

## 2017-09-22 RX ORDER — DEXAMETHASONE 0.5 MG/5ML
2 ELIXIR ORAL
Qty: 0 | Refills: 0 | Status: DISCONTINUED | OUTPATIENT
Start: 2017-09-22 | End: 2017-09-28

## 2017-09-22 RX ADMIN — HYDROMORPHONE HYDROCHLORIDE 0.5 MILLIGRAM(S): 2 INJECTION INTRAMUSCULAR; INTRAVENOUS; SUBCUTANEOUS at 13:37

## 2017-09-22 RX ADMIN — Medication 18 UNIT(S): at 17:50

## 2017-09-22 RX ADMIN — INSULIN GLARGINE 34 UNIT(S): 100 INJECTION, SOLUTION SUBCUTANEOUS at 22:06

## 2017-09-22 RX ADMIN — HYDROMORPHONE HYDROCHLORIDE 1 MILLIGRAM(S): 2 INJECTION INTRAMUSCULAR; INTRAVENOUS; SUBCUTANEOUS at 15:57

## 2017-09-22 RX ADMIN — PANTOPRAZOLE SODIUM 40 MILLIGRAM(S): 20 TABLET, DELAYED RELEASE ORAL at 07:03

## 2017-09-22 RX ADMIN — HYDROMORPHONE HYDROCHLORIDE 1 MILLIGRAM(S): 2 INJECTION INTRAMUSCULAR; INTRAVENOUS; SUBCUTANEOUS at 21:45

## 2017-09-22 RX ADMIN — Medication 18 UNIT(S): at 09:14

## 2017-09-22 RX ADMIN — HYDROMORPHONE HYDROCHLORIDE 0.5 MILLIGRAM(S): 2 INJECTION INTRAMUSCULAR; INTRAVENOUS; SUBCUTANEOUS at 18:12

## 2017-09-22 RX ADMIN — HYDROMORPHONE HYDROCHLORIDE 1 MILLIGRAM(S): 2 INJECTION INTRAMUSCULAR; INTRAVENOUS; SUBCUTANEOUS at 07:03

## 2017-09-22 RX ADMIN — Medication 4 MILLIGRAM(S): at 05:56

## 2017-09-22 RX ADMIN — HYDROMORPHONE HYDROCHLORIDE 1 MILLIGRAM(S): 2 INJECTION INTRAMUSCULAR; INTRAVENOUS; SUBCUTANEOUS at 09:44

## 2017-09-22 RX ADMIN — HYDROMORPHONE HYDROCHLORIDE 0.5 MILLIGRAM(S): 2 INJECTION INTRAMUSCULAR; INTRAVENOUS; SUBCUTANEOUS at 13:07

## 2017-09-22 RX ADMIN — METHADONE HYDROCHLORIDE 10 MILLIGRAM(S): 40 TABLET ORAL at 22:06

## 2017-09-22 RX ADMIN — HYDROMORPHONE HYDROCHLORIDE 0.5 MILLIGRAM(S): 2 INJECTION INTRAMUSCULAR; INTRAVENOUS; SUBCUTANEOUS at 17:57

## 2017-09-22 RX ADMIN — HYDROMORPHONE HYDROCHLORIDE 1 MILLIGRAM(S): 2 INJECTION INTRAMUSCULAR; INTRAVENOUS; SUBCUTANEOUS at 00:45

## 2017-09-22 RX ADMIN — Medication 2 MILLIGRAM(S): at 17:49

## 2017-09-22 RX ADMIN — HYDROMORPHONE HYDROCHLORIDE 1 MILLIGRAM(S): 2 INJECTION INTRAMUSCULAR; INTRAVENOUS; SUBCUTANEOUS at 15:42

## 2017-09-22 RX ADMIN — HYDROMORPHONE HYDROCHLORIDE 1 MILLIGRAM(S): 2 INJECTION INTRAMUSCULAR; INTRAVENOUS; SUBCUTANEOUS at 03:46

## 2017-09-22 RX ADMIN — METHADONE HYDROCHLORIDE 10 MILLIGRAM(S): 40 TABLET ORAL at 05:56

## 2017-09-22 RX ADMIN — HYDROMORPHONE HYDROCHLORIDE 1 MILLIGRAM(S): 2 INJECTION INTRAMUSCULAR; INTRAVENOUS; SUBCUTANEOUS at 09:14

## 2017-09-22 RX ADMIN — Medication 18 UNIT(S): at 13:07

## 2017-09-22 RX ADMIN — Medication 6: at 09:13

## 2017-09-22 RX ADMIN — HYDROMORPHONE HYDROCHLORIDE 1 MILLIGRAM(S): 2 INJECTION INTRAMUSCULAR; INTRAVENOUS; SUBCUTANEOUS at 21:13

## 2017-09-22 RX ADMIN — Medication 1 MILLIGRAM(S): at 05:56

## 2017-09-22 RX ADMIN — HYDROMORPHONE HYDROCHLORIDE 1 MILLIGRAM(S): 2 INJECTION INTRAMUSCULAR; INTRAVENOUS; SUBCUTANEOUS at 01:15

## 2017-09-22 RX ADMIN — Medication 6: at 17:50

## 2017-09-22 RX ADMIN — Medication 6: at 13:06

## 2017-09-22 RX ADMIN — HYDROMORPHONE HYDROCHLORIDE 1 MILLIGRAM(S): 2 INJECTION INTRAMUSCULAR; INTRAVENOUS; SUBCUTANEOUS at 04:05

## 2017-09-22 RX ADMIN — Medication 1 MILLIGRAM(S): at 18:34

## 2017-09-22 NOTE — CHART NOTE - NSCHARTNOTEFT_GEN_A_CORE
Malnutrtion f/u. pt consumes PO supplement 100%.    Source: Patient [ x]    Family [ ]     other [ ]    Diet : consistent carbohydrate      Patient reports [x ] nausea  [ ] vomiting [ ] diarrhea [ ] constipation  [ ]chewing problems [ ] swallowing issues  [ ] other:      PO intake:  < 50% [ ] 50-75% [x ]   % [ ]  other :     Source for PO intake [ ] Patient [ ] family [ ] chart [ ] staff [ ] other     Enteral /Parenteral Nutrition:   NA      Current Weight: 61kg  % Weight Change    Pertinent Medications: MEDICATIONS  (STANDING):  insulin lispro (HumaLOG) corrective regimen sliding scale   SubCutaneous at bedtime  dextrose 5%. 1000 milliLiter(s) (50 mL/Hr) IV Continuous <Continuous>  dextrose 50% Injectable 12.5 Gram(s) IV Push once  dextrose 50% Injectable 25 Gram(s) IV Push once  dextrose 50% Injectable 25 Gram(s) IV Push once  LORazepam     Tablet 1 milliGRAM(s) Oral two times a day  pantoprazole    Tablet 40 milliGRAM(s) Oral before breakfast  methadone    Tablet 10 milliGRAM(s) Oral <User Schedule>  insulin lispro (HumaLOG) corrective regimen sliding scale   SubCutaneous three times a day before meals  insulin glargine Injectable (LANTUS) 34 Unit(s) SubCutaneous at bedtime  insulin lispro Injectable (HumaLOG) 18 Unit(s) SubCutaneous three times a day before meals  HYDROmorphone  Injectable 0.5 milliGRAM(s) IV Push every 6 hours  dexamethasone     Tablet 2 milliGRAM(s) Oral two times a day    MEDICATIONS  (PRN):  ondansetron    Tablet 4 milliGRAM(s) Oral two times a day PRN Nausea and/or Vomiting  dextrose Gel 1 Dose(s) Oral once PRN Blood Glucose LESS THAN 70 milliGRAM(s)/deciliter  glucagon  Injectable 1 milliGRAM(s) IntraMuscular once PRN Glucose LESS THAN 70 milligrams/deciliter  naloxone Injectable 0.1 milliGRAM(s) IV Push every 3 minutes PRN Sedation or respiratory depression RR < 10  LORazepam     Tablet 1 milliGRAM(s) Oral every 8 hours PRN Anxiety  HYDROmorphone  Injectable 1 milliGRAM(s) IV Push every 3 hours PRN Breakthrough pain  OLANZapine Disintegrating Tablet 5 milliGRAM(s) Oral every 12 hours PRN Agitation    Pertinent Labs:      Skin: intact    Estimated Needs:   [ x] no change since previous assessment  [ ] recalculated:       Previous Nutrition Diagnosis:     [ ] Inadequate Energy Intake [ ]Inadequate Oral Intake [ ] Excessive Energy Intake     [ ] Underweight [ ] Increased Nutrient Needs [ ] Overweight/Obesity     [ ] Altered GI Function [ ] Unintended Weight Loss [ ] Food & Nutrition Related Knowledge Deficit [x ] Malnutrition          Nutrition Diagnosis is [x ] ongoing  [ ] resolved [ ] not applicable          [ ] Change Diet To:    [x ] Nutrition Supplement  continue  glucerna 1.2 shakes 2x/day, assist with menu alternatives. Monitor/encourage PO intake.     [ ] Nutrition Support    [ ] Other:        Monitoring and Evaluation:     [x ] PO intake [ ] Tolerance to diet prescription [ ] weights [x] follow up per protocol    [ ] other:

## 2017-09-22 NOTE — PROGRESS NOTE ADULT - SUBJECTIVE AND OBJECTIVE BOX
Diabetes Follow up note:  Interval Hx:  62 y/o M w/h/o uncontrolled type 2 DM in hospice care secondary to liver ca/pancreatitis/Hep C. Here from hospice with AMS and hyperglycemic. Reports tolerating POs. Pain better controlled. Remains with hyperglycemia while on Dexa 4mg bid. Changed back to 2mg bid today. 1st dose tonight.  Spoke to pt about plan of care if he goes home but pt was not able to give teach back. Keeps talking about Levemir once a day. Can't recall new info.  No documentation noted regarding reviewed and education with pt regarding insulin pen use. Pt can't recall reviewing this    Review of Systems:  GI: Tolerating POs without any N/V/D. + abd pain.   CV: No CP/SOB  ENDO: No S&Sx of hypoglycemia. Denies polydipsia, polyuria, polyphagia.       MEDS:  insulin lispro (HumaLOG) corrective regimen sliding scale   SubCutaneous at bedtime  dexamethasone     Tablet 2 milliGRAM(s) Oral two times a day  insulin lispro (HumaLOG) corrective regimen sliding scale   SubCutaneous three times a day before meals  insulin glargine Injectable (LANTUS) 34 Unit(s) SubCutaneous at bedtime  insulin lispro Injectable (HumaLOG) 16 Unit(s) SubCutaneous three times a day before meals      Allergies    No Known Allergies        PE:  General: Male lying in bed. NAD  Vital Signs Last 24 Hrs  T(C): 36.5 (09-22-17 @ 12:02), Max: 36.7 (09-21-17 @ 19:44)  T(F): 97.7 (09-22-17 @ 12:02), Max: 98.1 (09-21-17 @ 19:44)  HR: 59 (09-22-17 @ 12:02) (57 - 73)  BP: 144/77 (09-22-17 @ 12:02) (144/76 - 153/72)  BP(mean): --  RR: 18 (09-22-17 @ 12:02) (16 - 18)  SpO2: 97% (09-22-17 @ 12:02) (96% - 97%)  Abd: Soft, Tender. ND,   Extremities: Warm. no edema  Neuro: A&O X3    LABS:  CAPILLARY BLOOD GLUCOSE  300 (22 Sep 2017 11:50)  252 (22 Sep 2017 07:51)  230 (21 Sep 2017 20:27)  231 (21 Sep 2017 17:02)  291 (21 Sep 2017 12:21)  141 (21 Sep 2017 07:54)  252 (20 Sep 2017 20:58)  385 (20 Sep 2017 17:40)  216 (20 Sep 2017 11:24)  128 (20 Sep 2017 07:43)  180 (19 Sep 2017 21:22)  267 (19 Sep 2017 16:59)          09-20    133<L>  |  96  |  23  ----------------------------<  112<H>  4.1   |  27  |  0.65    Ca    8.5      20 Sep 2017 09:12            Hemoglobin A1C, Whole Blood: 9.4 % <H> [4.0 - 5.6] (09-14-17 @ 07:44)

## 2017-09-22 NOTE — PROGRESS NOTE ADULT - PROBLEM SELECTOR PLAN 4
Patient is DNR/I  Floor SW and  to further help with the patient's psychosocial situation as well as on advising him in regards to how to complete other paperwork such as a Power of   Hospice Referral was d/w . Patient is DNR/I  Patient asked me to call his friend, Omid, and update him about his current status. He also wanted me to d/w his friend about the support he was able to provide if the patient was to able to get from him if the patient was to go home with hospice. Moreno indicated the patient leaves with him in the second floor of his house. Moreno indicated that he and the patient's brother can probably care from him but that their availability was going to be limited from 12 to 5 pm.   Will transfer the patient to PCU for advance symptomatic Rx.   Hospice referrals were already started by 3 [facility] SW.

## 2017-09-22 NOTE — PROGRESS NOTE ADULT - SUBJECTIVE AND OBJECTIVE BOX
HPI:  60 y/o w/m h/o liver CA, pancreatitis, Hep-C , HTN ,IDDM. was at inpatient Hospice and was found wandering in another patients room and smearing feces according to EMS. As per [facility] nurse liaison, patient was found to have Temazepam and MS Contin that he appeared to be taking without informing the providers at inpatient hospice.  At the same time the patient was on a Dilaudid PCA and as per hospice, the patient may have become Delirious because of intoxication due to opioids and benzodiazepines.     We are currently f/u for symptoms and resources.     Today, the patient appears confused with delayed processing. He also appears to have psychomotor impairment. He indicates his pain is better controlled; however, he is having issues trying to think straight. For example, asked him to look for his friend's phone number and he was persistently looking for that info on some hospice papers and list of medications that were completely unrelated with my question. He also repeated himself multiple times. Furthermore, he was trying to open up a page on a notebook that was already open. Still he knew were he was, his diagnosis, and reasons for being in the hospital.       PERTINENT PMH REVIEWED:  [x ] YES [ ] NO           SOCIAL HISTORY:   Significant other/partner:               Children:    2               Bahai/Spirituality: Yazidi   Substance hx:  [ ] YES   [x] NO                   Tobacco hx:  [ x] YES  [ ] NO                       Alcohol hx: [ ] YES  [ ] NO         Home Opioid hx:  [x ] YES  [ ] NO   Living Situation: [ x] Home  [ ] Long term care  [ ] Rehab [ ] Other  Patient indicated history of polysubstance abuse (Benzodiazepines and when he was young Cocaine)     FAMILY HISTORY:  No pertinent family history in first degree relatives    [ ] Family history non-contributory     BASELINE (I)ADLs (prior to admission):  [place]: [ ] total  [x ] moderate [ ] dependent    ADVANCE DIRECTIVES:    DNR [x ] YES [ ] NO                            [x ] Completed  MOLST  [ ] YES [x ] NO                      [ ] Completed  Health Care Proxy [ x] YES  [ ] NO   [ ] Completed.   Living Will  [ ] YES [ ] NO             [ ] Surrogate  [ ] HCP  [ ] Guardian:      As per patient  Nevaehyoan Serrano is his HCP      (However, not paperwork is available)                                                    Phone#:  [phone]  Friend Moreno ISAAC [phone]  Allergies    No Known Allergies    Intolerances    MEDICATIONS  (STANDING):  insulin lispro (HumaLOG) corrective regimen sliding scale   SubCutaneous at bedtime  dextrose 5%. 1000 milliLiter(s) (50 mL/Hr) IV Continuous <Continuous>  dextrose 50% Injectable 12.5 Gram(s) IV Push once  dextrose 50% Injectable 25 Gram(s) IV Push once  dextrose 50% Injectable 25 Gram(s) IV Push once  LORazepam     Tablet 1 milliGRAM(s) Oral two times a day  pantoprazole    Tablet 40 milliGRAM(s) Oral before breakfast  methadone    Tablet 10 milliGRAM(s) Oral <User Schedule>  insulin lispro (HumaLOG) corrective regimen sliding scale   SubCutaneous three times a day before meals  insulin glargine Injectable (LANTUS) 34 Unit(s) SubCutaneous at bedtime  insulin lispro Injectable (HumaLOG) 18 Unit(s) SubCutaneous three times a day before meals  HYDROmorphone  Injectable 0.5 milliGRAM(s) IV Push every 6 hours  dexamethasone     Tablet 2 milliGRAM(s) Oral two times a day    MEDICATIONS  (PRN):  ondansetron    Tablet 4 milliGRAM(s) Oral two times a day PRN Nausea and/or Vomiting  dextrose Gel 1 Dose(s) Oral once PRN Blood Glucose LESS THAN 70 milliGRAM(s)/deciliter  glucagon  Injectable 1 milliGRAM(s) IntraMuscular once PRN Glucose LESS THAN 70 milligrams/deciliter  naloxone Injectable 0.1 milliGRAM(s) IV Push every 3 minutes PRN Sedation or respiratory depression RR < 10  LORazepam     Tablet 1 milliGRAM(s) Oral every 8 hours PRN Anxiety  HYDROmorphone  Injectable 1 milliGRAM(s) IV Push every 3 hours PRN Breakthrough pain  OLANZapine Disintegrating Tablet 5 milliGRAM(s) Oral every 12 hours PRN Agitation      PRESENT SYMPTOMS:  Source: [x ] Patient   [ ] Family   [ ] Team     Pain:                        [ ] No [x ] Yes             [ ] Mild [x ] Moderate [  ]    Onset - chronic   Location - RUQ and Epigastric region   Duration - Constant   Character - Cramping, Stabbing    Alleviating/Aggravating - Dilaudid   Radiation - band like towards LUQ   Timing - none      Dyspnea:                [x ] No [ ] Yes             [ ] Mild [ ] Moderate [ ] Severe    Anxiety:                  [ ] No [x ] Yes             [ ] Mild [x ] Moderate [ ] Severe    Fatigue:                  [ ] No [x ] Yes             [ ] Mild [x ] Moderate [ ] Severe    Nausea:                  [x ] No [ ] Yes             [ ] Mild [ ] Moderate [ ] Severe    Loss of appetite:   [ ] No [x ] Yes             [x ] Mild [ ] Moderate [ ] Severe    Constipation:        [x ] No [ ] Yes             [ ] Mild [ ] Moderate [ ] Severe    Other Symptoms:  [x ] All other review of systems negative   [ ] Unable to obtain due to poor mentation     Karnofsky Performance Score/Palliative Performance Status Version 2:  30-40       %    PHYSICAL EXAM:    Vital Signs Last 24 Hrs  T(C): 36.5 (22 Sep 2017 12:02), Max: 36.7 (21 Sep 2017 19:44)  T(F): 97.7 (22 Sep 2017 12:02), Max: 98.1 (21 Sep 2017 19:44)  HR: 59 (22 Sep 2017 12:02) (57 - 73)  BP: 144/77 (22 Sep 2017 12:02) (144/76 - 153/72)  BP(mean): --  RR: 18 (22 Sep 2017 12:02) (16 - 18)  SpO2: 97% (22 Sep 2017 12:02) (96% - 97%)    General:  [ x ]  Alert but confused. Oriented to person and place.     HEENT:  [ ] Normal   [x ] Dry mouth   [ ] ET Tube    [ ] Trach  [ ] Oral lesions    Lungs:   [x ] Clear [ ] Tachypnea  [ ] Audible excessive secretions   [ ] Rhonchi        [ ] Right [ ] Left [ ] Bilateral  [ ] Crackles        [ ] Right [ ] Left [ ] Bilateral  [ ] Wheezing     [ ] Right [ ] Left [ ] Bilateral    Cardiovascular:  S1, S2. No S3. No murmurs     Abdomen: [ ] Soft  [x ] Distended   [x ] +BS x 4  [ ] Non tender [x ] Tender over RUQ, Epigastric region, and maci-umbilical area [ ]PEG   [ ]OGT/ NGT   Last BM:   9/20    Genitourinary: [x ] Normal [ ] Incontinent   [ ] Oliguria/Anuria   [ ] Alatorre    Musculoskeletal:  [ ] Normal   [x ] Weakness  [ ] Bedbound/Wheelchair bound [ ] Edema    Neurological: [ ] No focal deficits  [ ] Cognitive impairment  [ ] Dysphagia [ ] Dysarthria [ ] Paresis [ ] Other. Confused.      Skin: [x ] Normal   [ ] Pressure ulcer(s)                  [ ] Rash    LABS:                  No new labs at this time.                                                         Shock: [ ] Septic [ ] Cardiogenic [ ] Neurologic [ ] Hypovolemic  Vasopressors x   Inotrophs x     Protein Calorie Malnutrition: [ ] Mild [ ] Moderate [ ] Severe    Oral Intake: [ ] Unable/mouth care only [ ] Minimal [ ] Moderate [ ] Full Capability  Diet: [ ] NPO [ ] Tube feeds [ ] TPN [ ] Other     RADIOLOGY & ADDITIONAL STUDIES:  < from: MRI Abdomen w/wo Cont (06.17.17 @ 08:47) >  EXAM:  MRI ABDOMEN WOW CON        PROCEDURE DATE:  06/17/2017  IMPRESSION: Cirrhosis.    Dominant liver mass in segments 5/8 is unchanged in size from most recent   CT dated May 23, 2017. Extensive right portal vein tumor invasion   extending just into the left and main portal vein is unchanged.    Another focus of hepatocellular carcinoma in segment 2 is mildly   increased in size compared with the MRI from April 24, 2017.    < end of copied text >    REFERRALS:   [ ] Chaplaincy  [ ] Hospice  [ ] Child Life  [ ] Social Work  [ ] Case management [ ] Holistic Therapy     Dominick Wilkerson MD [phone]  Assessment and Plan:

## 2017-09-22 NOTE — PROGRESS NOTE ADULT - PROBLEM SELECTOR PLAN 1
-test BG AC/HS  -c/w Lantus 34 units QHS  -Continue humalog 18 units AC meals for now  -c/w Humalog moderate correction scale AC and Mod HS scale.   -contact endo team if changes made to steroid regimen  -DM meds TBD depending on discharge disposition, insulin requirements and steroid doses. Pt is not able to care for his DM independently at his time. If discharge to hospice continue basal/bolus since pt requiring high dose of insulin as of now.   -Still waiting for staff to evaluate pt's ability to use insulin pen. Need documentation  -Plan discussed with pt/team/ Palliative team. Will follow. Goal is to prevent hypoglycemia or persistent hyperglycemia.  Contact info: [phone] (24/7)

## 2017-09-22 NOTE — PROGRESS NOTE ADULT - ASSESSMENT
62 y/o M w/h/o uncontrolled type 2 DM in hospice care secondary to liver ca/pancreatitis/Hep C. Here from hospice with AMS and hyperglycemiA. Pt on steroids with worsening hyperglycemia. Pt tolerating POs.  Glycemic control remains difficult to achieve> however, Dexa will be drecreased by half dose tonight so BG should get better. Will wait for BG values in new Dexa dose before making further insulin changes.   Per primary awaiting for discharge placement (Hospice facility versus home hospice). If pt going home he is not capable of recalling what to do as noted during encounter today. Pt gets easily confused with medication explanation and only remembers what he used to do at home.   This was discussed in depth with primary team NP and palliative care team and .  Pt not able to go home on 70/30 insulin since he reports skipping meals on and off at home.

## 2017-09-22 NOTE — PROGRESS NOTE ADULT - PROBLEM SELECTOR PLAN 3
Resolved.   Likely related to polysubstance abuse  In multiple occasions, the patient has verbalized understanding about the risks of using other opiods or sedatives while using prescribed medications. He understands that death may be the outcome of it and he indicated he will not inquire on the same situation that brought him to the hospital.  Consider addiction psych eval. Methadone for pain Rx was started. ?2/2 to opiods; however, work up and treatment of possible causes of it as per primary team. Consider CBC/CMP/ Lactulose and Utox.  Zyprexa 5 mg q 12 PRN

## 2017-09-22 NOTE — PROGRESS NOTE ADULT - PROBLEM SELECTOR PLAN 1
Will continue Methadone 10 mg 6/14/22 .   Will continue  Dilaudid 1 mg IV q 6 ATC and continuing 1 mg IV q 2 PRN ( If it is possible to discharge the patient home, may consider DC Dilaudid IV and starting Dilaudid 6 mg PO q 6 ATC  and 6 mg q 4h PRN.   Will continue Naloxone PRN   Will continue  Dexa to 4 mg BID  Patient indicates his main goal is to have appropriate symptomatic Rx. Will continue Methadone 10 mg 6/14/22 .   Will decrease  Dilaudid to  0.5 mg IV q 6 ATC and continuing 1 mg IV q 3 PRN due to AMS  Will continue Naloxone PRN   Will decrease  Dexa to 2 mg BID due to AMS   Patient indicates his main goal is to have appropriate symptomatic Rx.

## 2017-09-22 NOTE — PROGRESS NOTE ADULT - ASSESSMENT
62 y/o w/m h/o liver CA, pancreatitis, Hep-C , HTN ,IDDM admitted with Delirium in the setting of prescription medications abuse. Consult called for symptomatic Rx (pain/Anxiety) and resources (hospice referral). Today with confusion.

## 2017-09-22 NOTE — PROGRESS NOTE ADULT - SUBJECTIVE AND OBJECTIVE BOX
Patient is a 61y old  Male who presents with a chief complaint of Lethargy (15 Sep 2017 16:58)      SUBJECTIVE / OVERNIGHT EVENTS:  No new symptoms.  Review of Systems:   CONSTITUTIONAL: No fever, weight loss, or fatigue  EYES: No eye pain, visual disturbances, or discharge  ENMT:  No difficulty hearing, tinnitus, vertigo; No sinus or throat pain  NECK: No pain or stiffness  BREASTS: No pain, masses, or nipple discharge  RESPIRATORY: No cough, wheezing, chills or hemoptysis; No shortness of breath  CARDIOVASCULAR: No chest pain, palpitations, dizziness, or leg swelling  GASTROINTESTINAL: No abdominal or epigastric pain. No nausea, vomiting, or hematemesis; No diarrhea or constipation. No melena or hematochezia.  GENITOURINARY: No dysuria, frequency, hematuria, or incontinence  NEUROLOGICAL: No headaches, memory loss, loss of strength, numbness, or tremors  SKIN: No itching, burning, rashes, or lesions   LYMPH NODES: No enlarged glands  ENDOCRINE: No heat or cold intolerance; No hair loss  MUSCULOSKELETAL: No joint pain or swelling; No muscle, back, or extremity pain  PSYCHIATRIC: No depression, anxiety, mood swings, or difficulty sleeping  HEME/LYMPH: No easy bruising, or bleeding gums  ALLERY AND IMMUNOLOGIC: No hives or eczema    MEDICATIONS  (STANDING):  insulin lispro (HumaLOG) corrective regimen sliding scale   SubCutaneous at bedtime  dextrose 5%. 1000 milliLiter(s) (50 mL/Hr) IV Continuous <Continuous>  dextrose 50% Injectable 12.5 Gram(s) IV Push once  dextrose 50% Injectable 25 Gram(s) IV Push once  dextrose 50% Injectable 25 Gram(s) IV Push once  LORazepam     Tablet 1 milliGRAM(s) Oral two times a day  pantoprazole    Tablet 40 milliGRAM(s) Oral before breakfast  methadone    Tablet 10 milliGRAM(s) Oral <User Schedule>  insulin lispro (HumaLOG) corrective regimen sliding scale   SubCutaneous three times a day before meals  insulin glargine Injectable (LANTUS) 34 Unit(s) SubCutaneous at bedtime  insulin lispro Injectable (HumaLOG) 18 Unit(s) SubCutaneous three times a day before meals  HYDROmorphone  Injectable 0.5 milliGRAM(s) IV Push every 6 hours  dexamethasone     Tablet 2 milliGRAM(s) Oral two times a day    MEDICATIONS  (PRN):  ondansetron    Tablet 4 milliGRAM(s) Oral two times a day PRN Nausea and/or Vomiting  dextrose Gel 1 Dose(s) Oral once PRN Blood Glucose LESS THAN 70 milliGRAM(s)/deciliter  glucagon  Injectable 1 milliGRAM(s) IntraMuscular once PRN Glucose LESS THAN 70 milligrams/deciliter  naloxone Injectable 0.1 milliGRAM(s) IV Push every 3 minutes PRN Sedation or respiratory depression RR < 10  LORazepam     Tablet 1 milliGRAM(s) Oral every 8 hours PRN Anxiety  HYDROmorphone  Injectable 1 milliGRAM(s) IV Push every 3 hours PRN Breakthrough pain  OLANZapine Disintegrating Tablet 5 milliGRAM(s) Oral every 12 hours PRN Agitation      PHYSICAL EXAM:  Vital Signs Last 24 Hrs  T(C): 36.5 (22 Sep 2017 12:02), Max: 36.7 (21 Sep 2017 19:44)  T(F): 97.7 (22 Sep 2017 12:02), Max: 98.1 (21 Sep 2017 19:44)  HR: 59 (22 Sep 2017 12:02) (57 - 73)  BP: 144/77 (22 Sep 2017 12:02) (144/76 - 153/72)  BP(mean): --  RR: 18 (22 Sep 2017 12:02) (16 - 18)  SpO2: 97% (22 Sep 2017 12:02) (96% - 97%)  I&O's Summary    21 Sep 2017 07:01  -  22 Sep 2017 07:00  --------------------------------------------------------  IN: 1400 mL / OUT: 0 mL / NET: 1400 mL    22 Sep 2017 07:01  -  22 Sep 2017 14:08  --------------------------------------------------------  IN: 480 mL / OUT: 0 mL / NET: 480 mL      GENERAL: NAD, well-developed  HEAD:  Atraumatic, Normocephalic  EYES: EOMI, PERRLA, conjunctiva and sclera clear  NECK: Supple, No JVD  CHEST/LUNG: Clear to auscultation bilaterally; No wheeze  HEART: Regular rate and rhythm; No murmurs, rubs, or gallops  ABDOMEN: Soft, Nontender, Nondistended; Bowel sounds present  EXTREMITIES:  2+ Peripheral Pulses, No clubbing, cyanosis, or edema  PSYCH: AAOx3  NEUROLOGY: non-focal  SKIN: No rashes or lesions    LABS:  CAPILLARY BLOOD GLUCOSE  300 (22 Sep 2017 11:50)  252 (22 Sep 2017 07:51)  230 (21 Sep 2017 20:27)  231 (21 Sep 2017 17:02)                          RADIOLOGY & ADDITIONAL TESTS:    Imaging Personally Reviewed:    Consultant(s) Notes Reviewed:      Care Discussed with Consultants/Other Providers:

## 2017-09-23 LAB — PCP SPEC-MCNC: SIGNIFICANT CHANGE UP

## 2017-09-23 PROCEDURE — 99233 SBSQ HOSP IP/OBS HIGH 50: CPT | Mod: GC

## 2017-09-23 RX ADMIN — HYDROMORPHONE HYDROCHLORIDE 0.5 MILLIGRAM(S): 2 INJECTION INTRAMUSCULAR; INTRAVENOUS; SUBCUTANEOUS at 18:40

## 2017-09-23 RX ADMIN — HYDROMORPHONE HYDROCHLORIDE 0.5 MILLIGRAM(S): 2 INJECTION INTRAMUSCULAR; INTRAVENOUS; SUBCUTANEOUS at 00:30

## 2017-09-23 RX ADMIN — Medication 8: at 18:17

## 2017-09-23 RX ADMIN — HYDROMORPHONE HYDROCHLORIDE 1 MILLIGRAM(S): 2 INJECTION INTRAMUSCULAR; INTRAVENOUS; SUBCUTANEOUS at 13:30

## 2017-09-23 RX ADMIN — PANTOPRAZOLE SODIUM 40 MILLIGRAM(S): 20 TABLET, DELAYED RELEASE ORAL at 05:52

## 2017-09-23 RX ADMIN — LACTULOSE 20 GRAM(S): 10 SOLUTION ORAL at 05:51

## 2017-09-23 RX ADMIN — INSULIN GLARGINE 34 UNIT(S): 100 INJECTION, SOLUTION SUBCUTANEOUS at 22:10

## 2017-09-23 RX ADMIN — Medication 4: at 12:53

## 2017-09-23 RX ADMIN — HYDROMORPHONE HYDROCHLORIDE 0.5 MILLIGRAM(S): 2 INJECTION INTRAMUSCULAR; INTRAVENOUS; SUBCUTANEOUS at 18:09

## 2017-09-23 RX ADMIN — METHADONE HYDROCHLORIDE 10 MILLIGRAM(S): 40 TABLET ORAL at 13:02

## 2017-09-23 RX ADMIN — HYDROMORPHONE HYDROCHLORIDE 0.5 MILLIGRAM(S): 2 INJECTION INTRAMUSCULAR; INTRAVENOUS; SUBCUTANEOUS at 01:00

## 2017-09-23 RX ADMIN — HYDROMORPHONE HYDROCHLORIDE 1 MILLIGRAM(S): 2 INJECTION INTRAMUSCULAR; INTRAVENOUS; SUBCUTANEOUS at 13:02

## 2017-09-23 RX ADMIN — Medication 2: at 09:06

## 2017-09-23 RX ADMIN — Medication 18 UNIT(S): at 12:53

## 2017-09-23 RX ADMIN — HYDROMORPHONE HYDROCHLORIDE 0.5 MILLIGRAM(S): 2 INJECTION INTRAMUSCULAR; INTRAVENOUS; SUBCUTANEOUS at 05:52

## 2017-09-23 RX ADMIN — HYDROMORPHONE HYDROCHLORIDE 1 MILLIGRAM(S): 2 INJECTION INTRAMUSCULAR; INTRAVENOUS; SUBCUTANEOUS at 22:58

## 2017-09-23 RX ADMIN — HYDROMORPHONE HYDROCHLORIDE 0.5 MILLIGRAM(S): 2 INJECTION INTRAMUSCULAR; INTRAVENOUS; SUBCUTANEOUS at 06:22

## 2017-09-23 RX ADMIN — METHADONE HYDROCHLORIDE 10 MILLIGRAM(S): 40 TABLET ORAL at 05:51

## 2017-09-23 RX ADMIN — Medication 2 MILLIGRAM(S): at 05:52

## 2017-09-23 RX ADMIN — HYDROMORPHONE HYDROCHLORIDE 1 MILLIGRAM(S): 2 INJECTION INTRAMUSCULAR; INTRAVENOUS; SUBCUTANEOUS at 22:28

## 2017-09-23 RX ADMIN — Medication 18 UNIT(S): at 18:18

## 2017-09-23 RX ADMIN — METHADONE HYDROCHLORIDE 10 MILLIGRAM(S): 40 TABLET ORAL at 22:10

## 2017-09-23 RX ADMIN — HYDROMORPHONE HYDROCHLORIDE 0.5 MILLIGRAM(S): 2 INJECTION INTRAMUSCULAR; INTRAVENOUS; SUBCUTANEOUS at 12:00

## 2017-09-23 RX ADMIN — Medication 18 UNIT(S): at 09:07

## 2017-09-23 RX ADMIN — Medication 2 MILLIGRAM(S): at 18:10

## 2017-09-23 RX ADMIN — HYDROMORPHONE HYDROCHLORIDE 0.5 MILLIGRAM(S): 2 INJECTION INTRAMUSCULAR; INTRAVENOUS; SUBCUTANEOUS at 11:28

## 2017-09-23 NOTE — PROGRESS NOTE ADULT - PROBLEM SELECTOR PLAN 1
No changes on current regimen: Methadone 10 mg 6/14/22, Dilaudid to  0.5 mg IV q 6 ATC and continuing 1 mg IV q 3 PRN and Naloxone PRN. Dexa to 2 mg BID.

## 2017-09-23 NOTE — PROGRESS NOTE ADULT - SUBJECTIVE AND OBJECTIVE BOX
Patient is a 61y old  Male who presents with a chief complaint of Lethargy (15 Sep 2017 16:58)      SUBJECTIVE / OVERNIGHT EVENTS:   Feels better.  Denies CP/SOB/Palpitation/HA.    MEDICATIONS  (STANDING):  insulin lispro (HumaLOG) corrective regimen sliding scale   SubCutaneous at bedtime  dextrose 5%. 1000 milliLiter(s) (50 mL/Hr) IV Continuous <Continuous>  dextrose 50% Injectable 12.5 Gram(s) IV Push once  dextrose 50% Injectable 25 Gram(s) IV Push once  dextrose 50% Injectable 25 Gram(s) IV Push once  pantoprazole    Tablet 40 milliGRAM(s) Oral before breakfast  methadone    Tablet 10 milliGRAM(s) Oral <User Schedule>  insulin lispro (HumaLOG) corrective regimen sliding scale   SubCutaneous three times a day before meals  insulin glargine Injectable (LANTUS) 34 Unit(s) SubCutaneous at bedtime  insulin lispro Injectable (HumaLOG) 18 Unit(s) SubCutaneous three times a day before meals  HYDROmorphone  Injectable 0.5 milliGRAM(s) IV Push every 6 hours  dexamethasone     Tablet 2 milliGRAM(s) Oral two times a day  lactulose Syrup 20 Gram(s) Oral two times a day    MEDICATIONS  (PRN):  ondansetron    Tablet 4 milliGRAM(s) Oral two times a day PRN Nausea and/or Vomiting  dextrose Gel 1 Dose(s) Oral once PRN Blood Glucose LESS THAN 70 milliGRAM(s)/deciliter  glucagon  Injectable 1 milliGRAM(s) IntraMuscular once PRN Glucose LESS THAN 70 milligrams/deciliter  naloxone Injectable 0.1 milliGRAM(s) IV Push every 3 minutes PRN Sedation or respiratory depression RR < 10  HYDROmorphone  Injectable 1 milliGRAM(s) IV Push every 3 hours PRN Breakthrough pain  OLANZapine Disintegrating Tablet 5 milliGRAM(s) Oral every 12 hours PRN Agitation        CAPILLARY BLOOD GLUCOSE  335 (23 Sep 2017 17:10)  257 (23 Sep 2017 11:32)  178 (23 Sep 2017 08:29)  205 (22 Sep 2017 20:56)        I&O's Summary    22 Sep 2017 07:01  -  23 Sep 2017 07:00  --------------------------------------------------------  IN: 1200 mL / OUT: 0 mL / NET: 1200 mL    23 Sep 2017 07:01  -  23 Sep 2017 20:28  --------------------------------------------------------  IN: 1080 mL / OUT: 0 mL / NET: 1080 mL        PHYSICAL EXAM:  GENERAL: NAD, well-developed  HEAD:  Atraumatic, Normocephalic  EYES: conjunctiva and sclera clear  NECK: Supple, No JVD  CHEST/LUNG: Clear to auscultation bilaterally; No wheeze  HEART: Regular rate and rhythm; No murmurs, rubs, or gallops  ABDOMEN: Soft, Nontender, Nondistended; Bowel sounds present  EXTREMITIES:  2+ Peripheral Pulses, No clubbing, cyanosis, or edema  NEUROLOGY: AAO X 3  SKIN: No rashes    LABS:                        12.6   8.8   )-----------( 146      ( 22 Sep 2017 17:18 )             37.0     09-22    134<L>  |  96  |  30<H>  ----------------------------<  298<H>  4.4   |  29  |  0.76    Ca    8.7      22 Sep 2017 17:18    TPro  8.2  /  Alb  3.1<L>  /  TBili  0.5  /  DBili  x   /  AST  53<H>  /  ALT  58<H>  /  AlkPhos  221<H>  09-22            CAPILLARY BLOOD GLUCOSE  335 (23 Sep 2017 17:10)  257 (23 Sep 2017 11:32)  178 (23 Sep 2017 08:29)  205 (22 Sep 2017 20:56)                    RADIOLOGY & ADDITIONAL TESTS:    Imaging Personally Reviewed:    Consultant(s) Notes Reviewed:      Care Discussed with Consultants/Other Providers:

## 2017-09-23 NOTE — PROGRESS NOTE ADULT - ASSESSMENT
60 y/o w/m h/o liver CA, pancreatitis, Hep-C , HTN ,IDDM admitted with Delirium in the setting of prescription medications abuse. Consult called for symptomatic Rx (pain/Anxiety) and resources (hospice referral). Today with confusion.

## 2017-09-23 NOTE — PROGRESS NOTE ADULT - SUBJECTIVE AND OBJECTIVE BOX
HPI:  60 y/o w/m h/o liver CA, pancreatitis, Hep-C , HTN ,IDDM. was at inpatient Hospice and was found wandering in another patients room and smearing feces according to EMS. As [facility] nurse liaison, patient was found to have Temazepam and MS Contin that he appeared to be taking without informing the providers at inpatient hospice.  At the same time the patient was on a Dilaudid PCA and as per hospice, the patient may have become Delirious because of intoxication due to opioids and benzodiazepines.     Palliative Following for symptom management - Pain. Patient evaluated at bedside, asking when he can go home. Indicates that his pain is tolerable. Denies any other complaints. Plan is for transfer to PCU for symptom management.    PERTINENT PMH REVIEWED:  [x ] YES [ ] NO           SOCIAL HISTORY:   Significant other/partner:               Children:    2               Pentecostal/Spirituality: Adventism   Substance hx:  [ ] YES   [x] NO                   Tobacco hx:  [ x] YES  [ ] NO                       Alcohol hx: [ ] YES  [ ] NO         Home Opioid hx:  [x ] YES  [ ] NO   Living Situation: [ x] Home  [ ] Long term care  [ ] Rehab [ ] Other  Patient indicated history of polysubstance abuse (Benzodiazepines and when he was young Cocaine)     FAMILY HISTORY:  No pertinent family history in first degree relatives    [x ] Family history non-contributory     BASELINE (I)ADLs (prior to admission):  [place]: [ ] total  [x ] moderate [ ] dependent    ADVANCE DIRECTIVES:    DNR [x ] YES [ ] NO                            [x ] Completed  MOLST  [ ] YES [x ] NO                      [ ] Completed  Health Care Proxy [ x] YES  [ ] NO   [ ] Completed.   Living Will  [ ] YES [ ] NO             [ ] Surrogate  [ ] HCP  [ ] Guardian:      As per patient  Nevaeh Serrano is his HCP      (However, not paperwork is available)  Phone#:  [phone]  Other contacts: Friend Moreno ISAAC [phone]    Allergies    No Known Allergies    Intolerances    MEDICATIONS  (STANDING):  insulin lispro (HumaLOG) corrective regimen sliding scale   SubCutaneous at bedtime  dextrose 5%. 1000 milliLiter(s) (50 mL/Hr) IV Continuous <Continuous>  dextrose 50% Injectable 12.5 Gram(s) IV Push once  dextrose 50% Injectable 25 Gram(s) IV Push once  dextrose 50% Injectable 25 Gram(s) IV Push once  pantoprazole    Tablet 40 milliGRAM(s) Oral before breakfast  methadone    Tablet 10 milliGRAM(s) Oral <User Schedule>  insulin lispro (HumaLOG) corrective regimen sliding scale   SubCutaneous three times a day before meals  insulin glargine Injectable (LANTUS) 34 Unit(s) SubCutaneous at bedtime  insulin lispro Injectable (HumaLOG) 18 Unit(s) SubCutaneous three times a day before meals  HYDROmorphone  Injectable 0.5 milliGRAM(s) IV Push every 6 hours  dexamethasone     Tablet 2 milliGRAM(s) Oral two times a day  lactulose Syrup 20 Gram(s) Oral two times a day    MEDICATIONS  (PRN):  ondansetron    Tablet 4 milliGRAM(s) Oral two times a day PRN Nausea and/or Vomiting  dextrose Gel 1 Dose(s) Oral once PRN Blood Glucose LESS THAN 70 milliGRAM(s)/deciliter  glucagon  Injectable 1 milliGRAM(s) IntraMuscular once PRN Glucose LESS THAN 70 milligrams/deciliter  naloxone Injectable 0.1 milliGRAM(s) IV Push every 3 minutes PRN Sedation or respiratory depression RR < 10  HYDROmorphone  Injectable 1 milliGRAM(s) IV Push every 3 hours PRN Breakthrough pain  OLANZapine Disintegrating Tablet 5 milliGRAM(s) Oral every 12 hours PRN Agitation    PRESENT SYMPTOMS:  Source: [x ] Patient   [ ] Family   [ ] Team     Pain:                        [ ] No [x ] Yes             [ ] Mild [x ] Moderate [  ]    Onset - chronic   Location - RUQ and Epigastric region   Duration - Constant   Character - Cramping, Stabbing    Alleviating/Aggravating - Dilaudid   Radiation - band like towards LUQ   Timing - none      Dyspnea:                [x ] No [ ] Yes             [ ] Mild [ ] Moderate [ ] Severe    Anxiety:                  [ ] No [x ] Yes             [ ] Mild [x ] Moderate [ ] Severe    Fatigue:                  [ ] No [x ] Yes             [ ] Mild [x ] Moderate [ ] Severe    Nausea:                  [x ] No [ ] Yes             [ ] Mild [ ] Moderate [ ] Severe    Loss of appetite:   [ ] No [x ] Yes             [x ] Mild [ ] Moderate [ ] Severe    Constipation:        [x ] No [ ] Yes             [ ] Mild [ ] Moderate [ ] Severe    Other Symptoms:  [x ] All other review of systems negative   [ ] Unable to obtain due to poor mentation     Karnofsky Performance Score/Palliative Performance Status Version 2:  30-40 %    PHYSICAL EXAM:  Vital Signs Last 24 Hrs  T(C): 36.6 (23 Sep 2017 12:45), Max: 36.8 (23 Sep 2017 00:05)  T(F): 97.8 (23 Sep 2017 12:45), Max: 98.2 (23 Sep 2017 00:05)  HR: 75 (23 Sep 2017 12:45) (64 - 89)  BP: 135/75 (23 Sep 2017 12:45) (133/72 - 148/76)  BP(mean): --  RR: 18 (23 Sep 2017 12:45) (18 - 18)  SpO2: 97% (23 Sep 2017 12:45) (95% - 97%) I&O's Summary    22 Sep 2017 07:01  -  23 Sep 2017 07:00  --------------------------------------------------------  IN: 1200 mL / OUT: 0 mL / NET: 1200 mL    23 Sep 2017 07:01  -  23 Sep 2017 16:53  --------------------------------------------------------  IN: 840 mL / OUT: 0 mL / NET: 840 mL    General:  [ x ]  Alert but confused. Oriented to person and place.     HEENT:  [ ] Normal   [x ] Dry mouth   [ ] ET Tube    [ ] Trach  [ ] Oral lesions    Lungs:   [x ] Clear [ ] Tachypnea  [ ] Audible excessive secretions   [ ] Rhonchi        [ ] Right [ ] Left [ ] Bilateral  [ ] Crackles        [ ] Right [ ] Left [ ] Bilateral  [ ] Wheezing     [ ] Right [ ] Left [ ] Bilateral    Cardiovascular:  S1, S2. No S3. No murmurs     Abdomen: [ ] Soft  [x ] Distended   [x ] +BS x 4  [ ] Non tender [x ] Tender over RUQ, Epigastric region, and maci-umbilical area [ ]PEG   [ ]OGT/ NGT   Last BM:   9/20    Genitourinary: [x ] Normal [ ] Incontinent   [ ] Oliguria/Anuria   [ ] Alatorre    Musculoskeletal:  [ ] Normal   [x ] Weakness  [ ] Bedbound/Wheelchair bound [ ] Edema    Neurological: [ ] No focal deficits  [ ] Cognitive impairment  [ ] Dysphagia [ ] Dysarthria [ ] Paresis [ ] Other. Confused.      Skin: [x ] Normal   [ ] Pressure ulcer(s)                  [ ] Rash    LABS:                        12.6   8.8   )-----------( 146      ( 22 Sep 2017 17:18 )             37.0     09-22    134<L>  |  96  |  30<H>  ----------------------------<  298<H>  4.4   |  29  |  0.76    Ca    8.7      22 Sep 2017 17:18    TPro  8.2  /  Alb  3.1<L>  /  TBili  0.5  /  DBili  x   /  AST  53<H>  /  ALT  58<H>  /  AlkPhos  221<H>  09    Shock: No [ ] Septic [ ] Cardiogenic [ ] Neurologic [ ] Hypovolemic  Vasopressors x None  Inotrophs x None    Protein Calorie Malnutrition: [ ] Mild [x ] Moderate [ ] Severe    Oral Intake: [ ] Unable/mouth care only [x ] Minimal [ ] Moderate [ ] Full Capability  Diet: [ ] NPO [ ] Tube feeds [ ] TPN [x ] Other     RADIOLOGY & ADDITIONAL STUDIES: reviewed.    REFERRALS:   [ ] Chaplaincy  [ ] Hospice  [ ] Child Life  [ ] Social Work  [ ] Case management [ ] Holistic Therapy

## 2017-09-23 NOTE — PROGRESS NOTE ADULT - PROBLEM SELECTOR PLAN 4
Patient is DNR/ DNI. He is indicating that he wants to go home, repeating himself multiple times. Plan is for patient to be transferred to the PCU for symptom management, pending hospice referral.

## 2017-09-24 PROCEDURE — 99232 SBSQ HOSP IP/OBS MODERATE 35: CPT

## 2017-09-24 RX ORDER — INSULIN GLARGINE 100 [IU]/ML
28 INJECTION, SOLUTION SUBCUTANEOUS AT BEDTIME
Qty: 0 | Refills: 0 | Status: DISCONTINUED | OUTPATIENT
Start: 2017-09-24 | End: 2017-09-26

## 2017-09-24 RX ORDER — INSULIN LISPRO 100/ML
16 VIAL (ML) SUBCUTANEOUS
Qty: 0 | Refills: 0 | Status: DISCONTINUED | OUTPATIENT
Start: 2017-09-25 | End: 2017-09-26

## 2017-09-24 RX ORDER — INSULIN LISPRO 100/ML
20 VIAL (ML) SUBCUTANEOUS
Qty: 0 | Refills: 0 | Status: DISCONTINUED | OUTPATIENT
Start: 2017-09-24 | End: 2017-09-24

## 2017-09-24 RX ORDER — DEXTROSE 50 % IN WATER 50 %
1 SYRINGE (ML) INTRAVENOUS ONCE
Qty: 0 | Refills: 0 | Status: COMPLETED | OUTPATIENT
Start: 2017-09-24 | End: 2017-09-24

## 2017-09-24 RX ORDER — INSULIN LISPRO 100/ML
10 VIAL (ML) SUBCUTANEOUS
Qty: 0 | Refills: 0 | Status: COMPLETED | OUTPATIENT
Start: 2017-09-24 | End: 2017-09-24

## 2017-09-24 RX ADMIN — HYDROMORPHONE HYDROCHLORIDE 0.5 MILLIGRAM(S): 2 INJECTION INTRAMUSCULAR; INTRAVENOUS; SUBCUTANEOUS at 00:03

## 2017-09-24 RX ADMIN — HYDROMORPHONE HYDROCHLORIDE 1 MILLIGRAM(S): 2 INJECTION INTRAMUSCULAR; INTRAVENOUS; SUBCUTANEOUS at 14:11

## 2017-09-24 RX ADMIN — METHADONE HYDROCHLORIDE 10 MILLIGRAM(S): 40 TABLET ORAL at 22:16

## 2017-09-24 RX ADMIN — Medication 0.25 MILLIGRAM(S): at 22:16

## 2017-09-24 RX ADMIN — HYDROMORPHONE HYDROCHLORIDE 0.5 MILLIGRAM(S): 2 INJECTION INTRAMUSCULAR; INTRAVENOUS; SUBCUTANEOUS at 06:12

## 2017-09-24 RX ADMIN — Medication 1 DOSE(S): at 17:19

## 2017-09-24 RX ADMIN — Medication 20 UNIT(S): at 13:37

## 2017-09-24 RX ADMIN — HYDROMORPHONE HYDROCHLORIDE 0.5 MILLIGRAM(S): 2 INJECTION INTRAMUSCULAR; INTRAVENOUS; SUBCUTANEOUS at 23:58

## 2017-09-24 RX ADMIN — Medication 6: at 13:36

## 2017-09-24 RX ADMIN — HYDROMORPHONE HYDROCHLORIDE 0.5 MILLIGRAM(S): 2 INJECTION INTRAMUSCULAR; INTRAVENOUS; SUBCUTANEOUS at 11:49

## 2017-09-24 RX ADMIN — HYDROMORPHONE HYDROCHLORIDE 0.5 MILLIGRAM(S): 2 INJECTION INTRAMUSCULAR; INTRAVENOUS; SUBCUTANEOUS at 18:23

## 2017-09-24 RX ADMIN — HYDROMORPHONE HYDROCHLORIDE 1 MILLIGRAM(S): 2 INJECTION INTRAMUSCULAR; INTRAVENOUS; SUBCUTANEOUS at 13:41

## 2017-09-24 RX ADMIN — HYDROMORPHONE HYDROCHLORIDE 1 MILLIGRAM(S): 2 INJECTION INTRAMUSCULAR; INTRAVENOUS; SUBCUTANEOUS at 16:31

## 2017-09-24 RX ADMIN — HYDROMORPHONE HYDROCHLORIDE 0.5 MILLIGRAM(S): 2 INJECTION INTRAMUSCULAR; INTRAVENOUS; SUBCUTANEOUS at 00:33

## 2017-09-24 RX ADMIN — METHADONE HYDROCHLORIDE 10 MILLIGRAM(S): 40 TABLET ORAL at 15:12

## 2017-09-24 RX ADMIN — Medication 2 MILLIGRAM(S): at 18:23

## 2017-09-24 RX ADMIN — METHADONE HYDROCHLORIDE 10 MILLIGRAM(S): 40 TABLET ORAL at 06:11

## 2017-09-24 RX ADMIN — HYDROMORPHONE HYDROCHLORIDE 0.5 MILLIGRAM(S): 2 INJECTION INTRAMUSCULAR; INTRAVENOUS; SUBCUTANEOUS at 12:19

## 2017-09-24 RX ADMIN — HYDROMORPHONE HYDROCHLORIDE 1 MILLIGRAM(S): 2 INJECTION INTRAMUSCULAR; INTRAVENOUS; SUBCUTANEOUS at 21:00

## 2017-09-24 RX ADMIN — Medication 18 UNIT(S): at 08:49

## 2017-09-24 RX ADMIN — Medication 10 UNIT(S): at 18:23

## 2017-09-24 RX ADMIN — INSULIN GLARGINE 28 UNIT(S): 100 INJECTION, SOLUTION SUBCUTANEOUS at 22:17

## 2017-09-24 RX ADMIN — HYDROMORPHONE HYDROCHLORIDE 1 MILLIGRAM(S): 2 INJECTION INTRAMUSCULAR; INTRAVENOUS; SUBCUTANEOUS at 10:13

## 2017-09-24 RX ADMIN — HYDROMORPHONE HYDROCHLORIDE 1 MILLIGRAM(S): 2 INJECTION INTRAMUSCULAR; INTRAVENOUS; SUBCUTANEOUS at 20:28

## 2017-09-24 RX ADMIN — Medication 2 MILLIGRAM(S): at 06:11

## 2017-09-24 RX ADMIN — PANTOPRAZOLE SODIUM 40 MILLIGRAM(S): 20 TABLET, DELAYED RELEASE ORAL at 06:11

## 2017-09-24 RX ADMIN — HYDROMORPHONE HYDROCHLORIDE 1 MILLIGRAM(S): 2 INJECTION INTRAMUSCULAR; INTRAVENOUS; SUBCUTANEOUS at 17:01

## 2017-09-24 RX ADMIN — HYDROMORPHONE HYDROCHLORIDE 1 MILLIGRAM(S): 2 INJECTION INTRAMUSCULAR; INTRAVENOUS; SUBCUTANEOUS at 09:43

## 2017-09-24 RX ADMIN — HYDROMORPHONE HYDROCHLORIDE 0.5 MILLIGRAM(S): 2 INJECTION INTRAMUSCULAR; INTRAVENOUS; SUBCUTANEOUS at 06:42

## 2017-09-24 NOTE — PROVIDER CONTACT NOTE (OTHER) - ACTION/TREATMENT ORDERED:
Per LEILA Dangelo, endocrine wants reduced dose of 10 units insulin before dinner and make sure Pt eats, will continue to monitor

## 2017-09-24 NOTE — PROVIDER CONTACT NOTE (OTHER) - ACTION/TREATMENT ORDERED:
Hypoglycemic protocol activated, D5 gel to be given, repeat FS in 15 min per protocol, continue to monitor

## 2017-09-24 NOTE — PROGRESS NOTE ADULT - SUBJECTIVE AND OBJECTIVE BOX
Patient is a 61y old  Male who presents with a chief complaint of Lethargy (15 Sep 2017 16:58)      SUBJECTIVE / OVERNIGHT EVENTS:  Pain +  Review of Systems:   CONSTITUTIONAL: No fever, weight loss, or fatigue  EYES: No eye pain, visual disturbances, or discharge  ENMT:  No difficulty hearing, tinnitus, vertigo; No sinus or throat pain  NECK: No pain or stiffness  BREASTS: No pain, masses, or nipple discharge  RESPIRATORY: No cough, wheezing, chills or hemoptysis; No shortness of breath  CARDIOVASCULAR: No chest pain, palpitations, dizziness, or leg swelling  GASTROINTESTINAL: No  nausea, vomiting, or hematemesis; No diarrhea or constipation. No melena or hematochezia.  GENITOURINARY: No dysuria, frequency, hematuria, or incontinence  NEUROLOGICAL: No headaches, memory loss, loss of strength, numbness, or tremors  SKIN: No itching, burning, rashes, or lesions   LYMPH NODES: No enlarged glands  ENDOCRINE: No heat or cold intolerance; No hair loss  MUSCULOSKELETAL: No joint pain or swelling; No muscle, back, or extremity pain  PSYCHIATRIC: No depression, anxiety, mood swings, or difficulty sleeping  HEME/LYMPH: No easy bruising, or bleeding gums  ALLERY AND IMMUNOLOGIC: No hives or eczema    MEDICATIONS  (STANDING):  insulin lispro (HumaLOG) corrective regimen sliding scale   SubCutaneous at bedtime  dextrose 5%. 1000 milliLiter(s) (50 mL/Hr) IV Continuous <Continuous>  dextrose 50% Injectable 12.5 Gram(s) IV Push once  dextrose 50% Injectable 25 Gram(s) IV Push once  dextrose 50% Injectable 25 Gram(s) IV Push once  pantoprazole    Tablet 40 milliGRAM(s) Oral before breakfast  methadone    Tablet 10 milliGRAM(s) Oral <User Schedule>  insulin lispro (HumaLOG) corrective regimen sliding scale   SubCutaneous three times a day before meals  HYDROmorphone  Injectable 0.5 milliGRAM(s) IV Push every 6 hours  dexamethasone     Tablet 2 milliGRAM(s) Oral two times a day  lactulose Syrup 20 Gram(s) Oral two times a day  insulin glargine Injectable (LANTUS) 28 Unit(s) SubCutaneous at bedtime    MEDICATIONS  (PRN):  ondansetron    Tablet 4 milliGRAM(s) Oral two times a day PRN Nausea and/or Vomiting  dextrose Gel 1 Dose(s) Oral once PRN Blood Glucose LESS THAN 70 milliGRAM(s)/deciliter  glucagon  Injectable 1 milliGRAM(s) IntraMuscular once PRN Glucose LESS THAN 70 milligrams/deciliter  naloxone Injectable 0.1 milliGRAM(s) IV Push every 3 minutes PRN Sedation or respiratory depression RR < 10  HYDROmorphone  Injectable 1 milliGRAM(s) IV Push every 3 hours PRN Breakthrough pain  OLANZapine Disintegrating Tablet 5 milliGRAM(s) Oral every 12 hours PRN Agitation      PHYSICAL EXAM:  Vital Signs Last 24 Hrs  T(C): 36.7 (24 Sep 2017 19:47), Max: 36.7 (24 Sep 2017 04:33)  T(F): 98.1 (24 Sep 2017 19:47), Max: 98.1 (24 Sep 2017 04:33)  HR: 83 (24 Sep 2017 19:47) (67 - 83)  BP: 162/78 (24 Sep 2017 19:47) (130/69 - 162/78)  BP(mean): --  RR: 18 (24 Sep 2017 19:47) (18 - 18)  SpO2: 97% (24 Sep 2017 19:47) (95% - 98%)  I&O's Summary    23 Sep 2017 07:01  -  24 Sep 2017 07:00  --------------------------------------------------------  IN: 1081 mL / OUT: 0 mL / NET: 1081 mL    24 Sep 2017 07:01  -  24 Sep 2017 22:26  --------------------------------------------------------  IN: 780 mL / OUT: 0 mL / NET: 780 mL      GENERAL: NAD, well-developed  HEAD:  Atraumatic, Normocephalic  EYES: EOMI, PERRLA, conjunctiva and sclera clear  NECK: Supple, No JVD  CHEST/LUNG: Clear to auscultation bilaterally; No wheeze  HEART: Regular rate and rhythm; No murmurs, rubs, or gallops  ABDOMEN: Soft, Nontender, Nondistended; Bowel sounds present  EXTREMITIES:  2+ Peripheral Pulses, No clubbing, cyanosis, or edema  PSYCH: AAOx3  NEUROLOGY: non-focal  SKIN: No rashes or lesions    LABS:  CAPILLARY BLOOD GLUCOSE  225 (24 Sep 2017 20:41)  138 (24 Sep 2017 17:28)  53 (24 Sep 2017 17:11)  57 (24 Sep 2017 17:08)  281 (24 Sep 2017 11:27)  89 (24 Sep 2017 08:04)                          RADIOLOGY & ADDITIONAL TESTS:    Imaging Personally Reviewed:    Consultant(s) Notes Reviewed:      Care Discussed with Consultants/Other Providers:

## 2017-09-24 NOTE — PROVIDER CONTACT NOTE (OTHER) - SITUATION
Pt  after hypoglycemic protocol, Pt due for 20 units insulin premeal, RN asking if it is okay to give

## 2017-09-24 NOTE — PROGRESS NOTE ADULT - SUBJECTIVE AND OBJECTIVE BOX
Diabetes Follow up note:  Interval Hx:  60 y/o M w/h/o uncontrolled type 2 DM in hospice care secondary to liver ca/pancreatitis/Hep C. Here from hospice with AMS and hyperglycemic. Pt seen at bedside. Reports being very frustrated that he won't be released from hospital. Plan is for patient to go to palliative care unit per palliative note. Endorses good appetite. Remains on Dexa 2mg BID. Fasting BG value was 89 this AM but remains elevated post-prandial throughout the day.     Review of Systems:  General: "I feel trapped here"  GI: Tolerating POs without any N/V/D. + abd discomfort.   CV: No CP/SOB  ENDO: No S&Sx of hypoglycemia. Dry mouth  MEDS:  insulin lispro (HumaLOG) corrective regimen sliding scale   SubCutaneous at bedtime  insulin lispro (HumaLOG) corrective regimen sliding scale   SubCutaneous three times a day before meals  insulin glargine Injectable (LANTUS) 34 Unit(s) SubCutaneous at bedtime  insulin lispro Injectable (HumaLOG) 18 Unit(s) SubCutaneous three times a day before meals  dexamethasone     Tablet 2 milliGRAM(s) Oral two times a day      Allergies    No Known Allergies      PE:  General: Male lying in bed. NAD.   Vital Signs Last 24 Hrs  T(C): 36.4 (24 Sep 2017 12:10), Max: 36.9 (23 Sep 2017 19:53)  T(F): 97.6 (24 Sep 2017 12:10), Max: 98.5 (23 Sep 2017 19:53)  HR: 80 (24 Sep 2017 12:10) (67 - 80)  BP: 160/82 (24 Sep 2017 12:10) (130/69 - 160/82)  BP(mean): --  RR: 18 (24 Sep 2017 12:10) (18 - 18)  SpO2: 95% (24 Sep 2017 12:10) (95% - 98%)  Abd: Soft, T,ND,   Extremities: Warm. No edema noted.   Neuro: A&O X3    LABS:  CAPILLARY BLOOD GLUCOSE  281 (09-24 @ 11:27)  89 (09-24 @ 08:04)  218 (09-23 @ 21:42)  335 (09-23 @ 17:10)  257 (09-23 @ 11:32)  178 (09-23 @ 08:29)  205 (09-22 @ 20:56)  280 (09-22 @ 17:08)  300 (09-22 @ 11:50)  252 (09-22 @ 07:51)  230 (09-21 @ 20:27)  231 (09-21 @ 17:02)  291 (09-21 @ 12:21)                            12.6   8.8   )-----------( 146      ( 22 Sep 2017 17:18 )             37.0       09-22    134<L>  |  96  |  30<H>  ----------------------------<  298<H>  4.4   |  29  |  0.76    Ca    8.7      22 Sep 2017 17:18    TPro  8.2  /  Alb  3.1<L>  /  TBili  0.5  /  DBili  x   /  AST  53<H>  /  ALT  58<H>  /  AlkPhos  221<H>  09-22        Hemoglobin A1C, Whole Blood: 9.4 % <H> [4.0 - 5.6] (09-14-17 @ 07:44)            Contact number: chelsea [phone] or [phone]

## 2017-09-24 NOTE — PROGRESS NOTE ADULT - ASSESSMENT
60 y/o M w/h/o uncontrolled type 2 DM in hospice care secondary to liver ca/pancreatitis/Hep C. Here from hospice with AMS and hyperglycemiA. Pt on steroids with worsening hyperglycemia. Pt tolerating POs. Pt expresses frustration of not being allowed to leave the hospital. Pt has not demonstrated ability to remember and manage DM/insulin administration in current state per RN. Dispo pending-plan for patient to have pain managed with transfer to palliative care unit. If patient is discharged home, will need family/hospice care to manage insulin if remaining on decadron. BG goal is to avoid hypoglycemia and symptomatic hyperglycemia.

## 2017-09-25 PROCEDURE — 99232 SBSQ HOSP IP/OBS MODERATE 35: CPT

## 2017-09-25 RX ADMIN — HYDROMORPHONE HYDROCHLORIDE 1 MILLIGRAM(S): 2 INJECTION INTRAMUSCULAR; INTRAVENOUS; SUBCUTANEOUS at 20:30

## 2017-09-25 RX ADMIN — HYDROMORPHONE HYDROCHLORIDE 0.5 MILLIGRAM(S): 2 INJECTION INTRAMUSCULAR; INTRAVENOUS; SUBCUTANEOUS at 18:34

## 2017-09-25 RX ADMIN — HYDROMORPHONE HYDROCHLORIDE 0.5 MILLIGRAM(S): 2 INJECTION INTRAMUSCULAR; INTRAVENOUS; SUBCUTANEOUS at 11:28

## 2017-09-25 RX ADMIN — HYDROMORPHONE HYDROCHLORIDE 0.5 MILLIGRAM(S): 2 INJECTION INTRAMUSCULAR; INTRAVENOUS; SUBCUTANEOUS at 07:20

## 2017-09-25 RX ADMIN — HYDROMORPHONE HYDROCHLORIDE 1 MILLIGRAM(S): 2 INJECTION INTRAMUSCULAR; INTRAVENOUS; SUBCUTANEOUS at 13:48

## 2017-09-25 RX ADMIN — Medication 16 UNIT(S): at 13:28

## 2017-09-25 RX ADMIN — Medication 6: at 17:35

## 2017-09-25 RX ADMIN — HYDROMORPHONE HYDROCHLORIDE 1 MILLIGRAM(S): 2 INJECTION INTRAMUSCULAR; INTRAVENOUS; SUBCUTANEOUS at 23:33

## 2017-09-25 RX ADMIN — Medication 2 MILLIGRAM(S): at 17:35

## 2017-09-25 RX ADMIN — HYDROMORPHONE HYDROCHLORIDE 0.5 MILLIGRAM(S): 2 INJECTION INTRAMUSCULAR; INTRAVENOUS; SUBCUTANEOUS at 18:17

## 2017-09-25 RX ADMIN — HYDROMORPHONE HYDROCHLORIDE 0.5 MILLIGRAM(S): 2 INJECTION INTRAMUSCULAR; INTRAVENOUS; SUBCUTANEOUS at 00:28

## 2017-09-25 RX ADMIN — INSULIN GLARGINE 28 UNIT(S): 100 INJECTION, SOLUTION SUBCUTANEOUS at 22:51

## 2017-09-25 RX ADMIN — HYDROMORPHONE HYDROCHLORIDE 0.5 MILLIGRAM(S): 2 INJECTION INTRAMUSCULAR; INTRAVENOUS; SUBCUTANEOUS at 11:45

## 2017-09-25 RX ADMIN — Medication 2 MILLIGRAM(S): at 06:50

## 2017-09-25 RX ADMIN — Medication 16 UNIT(S): at 17:36

## 2017-09-25 RX ADMIN — Medication 2: at 08:58

## 2017-09-25 RX ADMIN — METHADONE HYDROCHLORIDE 10 MILLIGRAM(S): 40 TABLET ORAL at 15:25

## 2017-09-25 RX ADMIN — Medication 2: at 13:27

## 2017-09-25 RX ADMIN — PANTOPRAZOLE SODIUM 40 MILLIGRAM(S): 20 TABLET, DELAYED RELEASE ORAL at 06:50

## 2017-09-25 RX ADMIN — HYDROMORPHONE HYDROCHLORIDE 1 MILLIGRAM(S): 2 INJECTION INTRAMUSCULAR; INTRAVENOUS; SUBCUTANEOUS at 20:15

## 2017-09-25 RX ADMIN — HYDROMORPHONE HYDROCHLORIDE 0.5 MILLIGRAM(S): 2 INJECTION INTRAMUSCULAR; INTRAVENOUS; SUBCUTANEOUS at 06:50

## 2017-09-25 RX ADMIN — HYDROMORPHONE HYDROCHLORIDE 1 MILLIGRAM(S): 2 INJECTION INTRAMUSCULAR; INTRAVENOUS; SUBCUTANEOUS at 17:07

## 2017-09-25 RX ADMIN — HYDROMORPHONE HYDROCHLORIDE 1 MILLIGRAM(S): 2 INJECTION INTRAMUSCULAR; INTRAVENOUS; SUBCUTANEOUS at 13:28

## 2017-09-25 RX ADMIN — METHADONE HYDROCHLORIDE 10 MILLIGRAM(S): 40 TABLET ORAL at 06:50

## 2017-09-25 RX ADMIN — Medication 16 UNIT(S): at 08:58

## 2017-09-25 RX ADMIN — HYDROMORPHONE HYDROCHLORIDE 1 MILLIGRAM(S): 2 INJECTION INTRAMUSCULAR; INTRAVENOUS; SUBCUTANEOUS at 23:50

## 2017-09-25 RX ADMIN — METHADONE HYDROCHLORIDE 10 MILLIGRAM(S): 40 TABLET ORAL at 22:19

## 2017-09-25 RX ADMIN — HYDROMORPHONE HYDROCHLORIDE 1 MILLIGRAM(S): 2 INJECTION INTRAMUSCULAR; INTRAVENOUS; SUBCUTANEOUS at 16:51

## 2017-09-25 NOTE — PROGRESS NOTE ADULT - ASSESSMENT
60 y/o M w/h/o uncontrolled type 2 DM in hospice care secondary to liver ca/pancreatitis/Hep C. Here from hospice with AMS and hyperglycemiA. Pt on steroids with worsening hyperglycemia. Pt tolerating POs. Glycemic control at goal while on present insulin regimen and after adjustments made yesterday due to hypoglycemia, Pt awaiting discharge disposition since pt is A&Ox3 but unable to care for himself. Gets easily confused with meds specially insulin. Pt will need supervision with ALL meds.

## 2017-09-25 NOTE — PROGRESS NOTE ADULT - SUBJECTIVE AND OBJECTIVE BOX
Patient is a 61y old  Male who presents with a chief complaint of Lethargy (15 Sep 2017 16:58)      SUBJECTIVE / OVERNIGHT EVENTS:    Review of Systems:   CONSTITUTIONAL: No fever, weight loss, or fatigue  EYES: No eye pain, visual disturbances, or discharge  ENMT:  No difficulty hearing, tinnitus, vertigo; No sinus or throat pain  NECK: No pain or stiffness  BREASTS: No pain, masses, or nipple discharge  RESPIRATORY: No cough, wheezing, chills or hemoptysis; No shortness of breath  CARDIOVASCULAR: No chest pain, palpitations, dizziness, or leg swelling  GASTROINTESTINAL: No abdominal or epigastric pain. No nausea, vomiting, or hematemesis; No diarrhea or constipation. No melena or hematochezia.  GENITOURINARY: No dysuria, frequency, hematuria, or incontinence  NEUROLOGICAL: No headaches, memory loss, loss of strength, numbness, or tremors  SKIN: No itching, burning, rashes, or lesions   LYMPH NODES: No enlarged glands  ENDOCRINE: No heat or cold intolerance; No hair loss  MUSCULOSKELETAL: No joint pain or swelling; No muscle, back, or extremity pain  PSYCHIATRIC: No depression, anxiety, mood swings, or difficulty sleeping  HEME/LYMPH: No easy bruising, or bleeding gums  ALLERY AND IMMUNOLOGIC: No hives or eczema    MEDICATIONS  (STANDING):  insulin lispro (HumaLOG) corrective regimen sliding scale   SubCutaneous at bedtime  dextrose 5%. 1000 milliLiter(s) (50 mL/Hr) IV Continuous <Continuous>  dextrose 50% Injectable 12.5 Gram(s) IV Push once  dextrose 50% Injectable 25 Gram(s) IV Push once  dextrose 50% Injectable 25 Gram(s) IV Push once  pantoprazole    Tablet 40 milliGRAM(s) Oral before breakfast  methadone    Tablet 10 milliGRAM(s) Oral <User Schedule>  insulin lispro (HumaLOG) corrective regimen sliding scale   SubCutaneous three times a day before meals  HYDROmorphone  Injectable 0.5 milliGRAM(s) IV Push every 6 hours  dexamethasone     Tablet 2 milliGRAM(s) Oral two times a day  lactulose Syrup 20 Gram(s) Oral two times a day  insulin glargine Injectable (LANTUS) 28 Unit(s) SubCutaneous at bedtime  insulin lispro Injectable (HumaLOG) 16 Unit(s) SubCutaneous three times a day before meals    MEDICATIONS  (PRN):  ondansetron    Tablet 4 milliGRAM(s) Oral two times a day PRN Nausea and/or Vomiting  dextrose Gel 1 Dose(s) Oral once PRN Blood Glucose LESS THAN 70 milliGRAM(s)/deciliter  glucagon  Injectable 1 milliGRAM(s) IntraMuscular once PRN Glucose LESS THAN 70 milligrams/deciliter  naloxone Injectable 0.1 milliGRAM(s) IV Push every 3 minutes PRN Sedation or respiratory depression RR < 10  HYDROmorphone  Injectable 1 milliGRAM(s) IV Push every 3 hours PRN Breakthrough pain  OLANZapine Disintegrating Tablet 5 milliGRAM(s) Oral every 12 hours PRN Agitation      PHYSICAL EXAM:  Vital Signs Last 24 Hrs  T(C): 36.9 (25 Sep 2017 12:19), Max: 37 (25 Sep 2017 05:39)  T(F): 98.4 (25 Sep 2017 12:19), Max: 98.6 (25 Sep 2017 05:39)  HR: 74 (25 Sep 2017 12:19) (63 - 83)  BP: 143/83 (25 Sep 2017 12:19) (143/83 - 162/78)  BP(mean): --  RR: 18 (25 Sep 2017 12:19) (18 - 18)  SpO2: 98% (25 Sep 2017 12:19) (97% - 98%)  I&O's Summary    24 Sep 2017 07:01  -  25 Sep 2017 07:00  --------------------------------------------------------  IN: 780 mL / OUT: 0 mL / NET: 780 mL    25 Sep 2017 07:01  -  25 Sep 2017 17:48  --------------------------------------------------------  IN: 720 mL / OUT: 0 mL / NET: 720 mL      GENERAL: NAD, well-developed  HEAD:  Atraumatic, Normocephalic  EYES: EOMI, PERRLA, conjunctiva and sclera clear  NECK: Supple, No JVD  CHEST/LUNG: Clear to auscultation bilaterally; No wheeze  HEART: Regular rate and rhythm; No murmurs, rubs, or gallops  ABDOMEN: Soft, Nontender, Nondistended; Bowel sounds present  EXTREMITIES:  2+ Peripheral Pulses, No clubbing, cyanosis, or edema  PSYCH: AAOx3  NEUROLOGY: non-focal  SKIN: No rashes or lesions    LABS:  CAPILLARY BLOOD GLUCOSE  253 (25 Sep 2017 16:43)  167 (25 Sep 2017 11:40)  160 (25 Sep 2017 07:36)  225 (24 Sep 2017 20:41)                          RADIOLOGY & ADDITIONAL TESTS:    Imaging Personally Reviewed:    Consultant(s) Notes Reviewed:      Care Discussed with Consultants/Other Providers:

## 2017-09-25 NOTE — PROGRESS NOTE ADULT - SUBJECTIVE AND OBJECTIVE BOX
Diabetes Follow up note:  Interval Hx: 62 y/o M w/h/o uncontrolled type 2 DM in hospice care secondary to liver ca/pancreatitis/Hep C. Here from hospice with AMS and hyperglycemic. Tolerating POs with glycemic control variable. Hyperglycemic ac lunch yesterday with rebound hypoglycemia for dinner after getting correction and meal time humalog. Humalog ac meals decreased with BG at goal (100-180) today. Remains on Dexa 2mg BID. Pt requesting to be discharged home.    Review of Systems:  General: "I do not know what is going on" "I want to go home"  GI: Tolerating POs without any N/V/D. + abd discomfort on and off.   CV: No CP/SOB  ENDO: No S&Sx of hypoglycemia. Dry mouth      MEDS:  insulin lispro (HumaLOG) corrective regimen sliding scale   SubCutaneous at bedtime  insulin lispro (HumaLOG) corrective regimen sliding scale   SubCutaneous three times a day before meals  insulin glargine Injectable (LANTUS) 34 Unit(s) SubCutaneous at bedtime  insulin lispro Injectable (HumaLOG) 16 Unit(s) SubCutaneous three times a day before meals  dexamethasone     Tablet 2 milliGRAM(s) Oral two times a day      Allergies    No Known Allergies      PE:  General: Male lying in bed. NAD.   Vital Signs Last 24 Hrs  T(C): 36.9 (09-25-17 @ 12:19), Max: 37 (09-25-17 @ 05:39)  T(F): 98.4 (09-25-17 @ 12:19), Max: 98.6 (09-25-17 @ 05:39)  HR: 74 (09-25-17 @ 12:19) (63 - 83)  BP: 143/83 (09-25-17 @ 12:19) (143/83 - 162/78)  BP(mean): --  RR: 18 (09-25-17 @ 12:19) (18 - 18)  SpO2: 98% (09-25-17 @ 12:19) (97% - 98%)  Abd: Soft, Tender,ND,   Extremities: Warm. No edema noted in all 4 exts.   Neuro: A&O X3    LABS:  CAPILLARY BLOOD GLUCOSE  167 (25 Sep 2017 11:40)  160 (25 Sep 2017 07:36)  225 (24 Sep 2017 20:41)  138 (24 Sep 2017 17:28)  53 (24 Sep 2017 17:11)  57 (24 Sep 2017 17:08)  281 (24 Sep 2017 11:27)  89 (24 Sep 2017 08:04)  218 (23 Sep 2017 21:42)  335 (23 Sep 2017 17:10)  257 (23 Sep 2017 11:32)  178 (23 Sep 2017 08:29)  205 (22 Sep 2017 20:56)  280 (22 Sep 2017 17:08)                          12.6   8.8   )-----------( 146      ( 22 Sep 2017 17:18 )             37.0       09-22    134<L>  |  96  |  30<H>  ----------------------------<  298<H>  4.4   |  29  |  0.76    Ca    8.7      22 Sep 2017 17:18    TPro  8.2  /  Alb  3.1<L>  /  TBili  0.5  /  DBili  x   /  AST  53<H>  /  ALT  58<H>  /  AlkPhos  221<H>  09-22        Hemoglobin A1C, Whole Blood: 9.4 % <H> [4.0 - 5.6] (09-14-17 @ 07:44)

## 2017-09-25 NOTE — PROGRESS NOTE ADULT - PROBLEM SELECTOR PLAN 1
-test BG AC/HS  -Continue lantus 28 units QHS  -Continue Humalog 16 units AC meals for now  -c/w Humalog moderate correction scale AC and Mod HS scale  -Plan discussed with pt/team.  Contact info: [phone] (24/7). pager [phone]

## 2017-09-26 LAB
AMMONIA BLD-MCNC: 151 UMOL/L — HIGH (ref 11–55)
ANION GAP SERPL CALC-SCNC: 7 MMOL/L — SIGNIFICANT CHANGE UP (ref 5–17)
BUN SERPL-MCNC: 24 MG/DL — HIGH (ref 7–23)
CALCIUM SERPL-MCNC: 8.8 MG/DL — SIGNIFICANT CHANGE UP (ref 8.4–10.5)
CHLORIDE SERPL-SCNC: 98 MMOL/L — SIGNIFICANT CHANGE UP (ref 96–108)
CO2 SERPL-SCNC: 28 MMOL/L — SIGNIFICANT CHANGE UP (ref 22–31)
CREAT SERPL-MCNC: 0.68 MG/DL — SIGNIFICANT CHANGE UP (ref 0.5–1.3)
GLUCOSE SERPL-MCNC: 103 MG/DL — HIGH (ref 70–99)
HCT VFR BLD CALC: 34.9 % — LOW (ref 39–50)
HGB BLD-MCNC: 11.6 G/DL — LOW (ref 13–17)
MCHC RBC-ENTMCNC: 31.7 PG — SIGNIFICANT CHANGE UP (ref 27–34)
MCHC RBC-ENTMCNC: 33.2 GM/DL — SIGNIFICANT CHANGE UP (ref 32–36)
MCV RBC AUTO: 95.4 FL — SIGNIFICANT CHANGE UP (ref 80–100)
PLATELET # BLD AUTO: 126 K/UL — LOW (ref 150–400)
POTASSIUM SERPL-MCNC: 4 MMOL/L — SIGNIFICANT CHANGE UP (ref 3.5–5.3)
POTASSIUM SERPL-SCNC: 4 MMOL/L — SIGNIFICANT CHANGE UP (ref 3.5–5.3)
RBC # BLD: 3.66 M/UL — LOW (ref 4.2–5.8)
RBC # FLD: 16.2 % — HIGH (ref 10.3–14.5)
SODIUM SERPL-SCNC: 133 MMOL/L — LOW (ref 135–145)
WBC # BLD: 6.34 K/UL — SIGNIFICANT CHANGE UP (ref 3.8–10.5)
WBC # FLD AUTO: 6.34 K/UL — SIGNIFICANT CHANGE UP (ref 3.8–10.5)

## 2017-09-26 PROCEDURE — 99232 SBSQ HOSP IP/OBS MODERATE 35: CPT | Mod: GV

## 2017-09-26 PROCEDURE — 99233 SBSQ HOSP IP/OBS HIGH 50: CPT

## 2017-09-26 RX ORDER — INSULIN LISPRO 100/ML
18 VIAL (ML) SUBCUTANEOUS
Qty: 0 | Refills: 0 | Status: DISCONTINUED | OUTPATIENT
Start: 2017-09-26 | End: 2017-09-27

## 2017-09-26 RX ORDER — METHADONE HYDROCHLORIDE 40 MG/1
10 TABLET ORAL
Qty: 0 | Refills: 0 | Status: DISCONTINUED | OUTPATIENT
Start: 2017-09-26 | End: 2017-09-28

## 2017-09-26 RX ORDER — INSULIN GLARGINE 100 [IU]/ML
22 INJECTION, SOLUTION SUBCUTANEOUS AT BEDTIME
Qty: 0 | Refills: 0 | Status: DISCONTINUED | OUTPATIENT
Start: 2017-09-26 | End: 2017-09-27

## 2017-09-26 RX ORDER — HYDROMORPHONE HYDROCHLORIDE 2 MG/ML
2 INJECTION INTRAMUSCULAR; INTRAVENOUS; SUBCUTANEOUS EVERY 6 HOURS
Qty: 0 | Refills: 0 | Status: DISCONTINUED | OUTPATIENT
Start: 2017-09-26 | End: 2017-09-28

## 2017-09-26 RX ORDER — HYDROMORPHONE HYDROCHLORIDE 2 MG/ML
2 INJECTION INTRAMUSCULAR; INTRAVENOUS; SUBCUTANEOUS EVERY 4 HOURS
Qty: 0 | Refills: 0 | Status: DISCONTINUED | OUTPATIENT
Start: 2017-09-26 | End: 2017-09-28

## 2017-09-26 RX ADMIN — HYDROMORPHONE HYDROCHLORIDE 0.5 MILLIGRAM(S): 2 INJECTION INTRAMUSCULAR; INTRAVENOUS; SUBCUTANEOUS at 12:08

## 2017-09-26 RX ADMIN — METHADONE HYDROCHLORIDE 10 MILLIGRAM(S): 40 TABLET ORAL at 22:13

## 2017-09-26 RX ADMIN — PANTOPRAZOLE SODIUM 40 MILLIGRAM(S): 20 TABLET, DELAYED RELEASE ORAL at 06:11

## 2017-09-26 RX ADMIN — HYDROMORPHONE HYDROCHLORIDE 1 MILLIGRAM(S): 2 INJECTION INTRAMUSCULAR; INTRAVENOUS; SUBCUTANEOUS at 16:13

## 2017-09-26 RX ADMIN — HYDROMORPHONE HYDROCHLORIDE 1 MILLIGRAM(S): 2 INJECTION INTRAMUSCULAR; INTRAVENOUS; SUBCUTANEOUS at 13:35

## 2017-09-26 RX ADMIN — HYDROMORPHONE HYDROCHLORIDE 0.5 MILLIGRAM(S): 2 INJECTION INTRAMUSCULAR; INTRAVENOUS; SUBCUTANEOUS at 00:48

## 2017-09-26 RX ADMIN — Medication 4: at 12:56

## 2017-09-26 RX ADMIN — HYDROMORPHONE HYDROCHLORIDE 1 MILLIGRAM(S): 2 INJECTION INTRAMUSCULAR; INTRAVENOUS; SUBCUTANEOUS at 16:45

## 2017-09-26 RX ADMIN — HYDROMORPHONE HYDROCHLORIDE 0.5 MILLIGRAM(S): 2 INJECTION INTRAMUSCULAR; INTRAVENOUS; SUBCUTANEOUS at 06:11

## 2017-09-26 RX ADMIN — HYDROMORPHONE HYDROCHLORIDE 1 MILLIGRAM(S): 2 INJECTION INTRAMUSCULAR; INTRAVENOUS; SUBCUTANEOUS at 09:45

## 2017-09-26 RX ADMIN — Medication 2 MILLIGRAM(S): at 06:10

## 2017-09-26 RX ADMIN — HYDROMORPHONE HYDROCHLORIDE 1 MILLIGRAM(S): 2 INJECTION INTRAMUSCULAR; INTRAVENOUS; SUBCUTANEOUS at 09:14

## 2017-09-26 RX ADMIN — HYDROMORPHONE HYDROCHLORIDE 2 MILLIGRAM(S): 2 INJECTION INTRAMUSCULAR; INTRAVENOUS; SUBCUTANEOUS at 23:11

## 2017-09-26 RX ADMIN — HYDROMORPHONE HYDROCHLORIDE 2 MILLIGRAM(S): 2 INJECTION INTRAMUSCULAR; INTRAVENOUS; SUBCUTANEOUS at 20:09

## 2017-09-26 RX ADMIN — HYDROMORPHONE HYDROCHLORIDE 1 MILLIGRAM(S): 2 INJECTION INTRAMUSCULAR; INTRAVENOUS; SUBCUTANEOUS at 13:05

## 2017-09-26 RX ADMIN — HYDROMORPHONE HYDROCHLORIDE 0.5 MILLIGRAM(S): 2 INJECTION INTRAMUSCULAR; INTRAVENOUS; SUBCUTANEOUS at 12:40

## 2017-09-26 RX ADMIN — Medication 16 UNIT(S): at 09:03

## 2017-09-26 RX ADMIN — INSULIN GLARGINE 22 UNIT(S): 100 INJECTION, SOLUTION SUBCUTANEOUS at 22:14

## 2017-09-26 RX ADMIN — HYDROMORPHONE HYDROCHLORIDE 0.5 MILLIGRAM(S): 2 INJECTION INTRAMUSCULAR; INTRAVENOUS; SUBCUTANEOUS at 01:05

## 2017-09-26 RX ADMIN — HYDROMORPHONE HYDROCHLORIDE 0.5 MILLIGRAM(S): 2 INJECTION INTRAMUSCULAR; INTRAVENOUS; SUBCUTANEOUS at 18:04

## 2017-09-26 RX ADMIN — Medication 18 UNIT(S): at 18:03

## 2017-09-26 RX ADMIN — Medication 16 UNIT(S): at 12:56

## 2017-09-26 RX ADMIN — HYDROMORPHONE HYDROCHLORIDE 2 MILLIGRAM(S): 2 INJECTION INTRAMUSCULAR; INTRAVENOUS; SUBCUTANEOUS at 20:49

## 2017-09-26 RX ADMIN — HYDROMORPHONE HYDROCHLORIDE 0.5 MILLIGRAM(S): 2 INJECTION INTRAMUSCULAR; INTRAVENOUS; SUBCUTANEOUS at 06:28

## 2017-09-26 RX ADMIN — Medication 2: at 18:03

## 2017-09-26 RX ADMIN — Medication 2 MILLIGRAM(S): at 18:03

## 2017-09-26 NOTE — PROGRESS NOTE ADULT - PROBLEM SELECTOR PLAN 4
Patient is DNR/ DNI. He is indicating that he wants to go home, repeating himself multiple times. Plan is for patient to be transferred to the PCU for symptom management, pending hospice referral. Case manage and primary NP met the patient's family today. Family indicated full support for the patient and including administration o medications. Please see  note for a complete description of today's FM. Patient is hospice eligible.  is to send new referrals for home hospice. If patient is again rejected for hospice services, he should go home with home services and ideally with a palliative care program.

## 2017-09-26 NOTE — PROGRESS NOTE ADULT - PROBLEM SELECTOR PLAN 1
No changes on current regimen: Methadone 10 mg 6/14/22, Dilaudid to  0.5 mg IV q 6 ATC and continuing 1 mg IV q 3 PRN and Naloxone PRN. Dexa to 2 mg BID. Will change IV meds to PO once discharge process is approaching.   Will continue Methadone 10 mg 6/14/22  Will DC Dilaudid IV and start Dilaudid PO 2 mg q 4 PRN and will continue 2 mg q 6 ATC.  Naloxone PRN . Dexa to 2 mg BID.  In several occasions,  I have d/w the patient about the importance of taking only  his mediations (including opiods and benzos) as prescribed and he gave verbalized understanding of it. He also understand that using home pain medications and benzos in the hospital may result in severe adverse effects.

## 2017-09-26 NOTE — PROGRESS NOTE ADULT - SUBJECTIVE AND OBJECTIVE BOX
Diabetes Follow up note:  Interval Hx: 60 y/o M w/h/o uncontrolled type 2 DM in hospice care secondary to liver ca/pancreatitis/Hep C. Here from hospice with AMS and hyperglycemic. Pt seen at bedside. Pt wants to go home. Tolerating POs and remains on Dexa 2mg BID. Fasting BG value down to 77 this am. Pt states he is eating all his meals. BG ac lunch >200s.    Review of Systems:  General: "I want to go home"  GI: Tolerating POs without any N/V/D. On and off abd discomfort.   CV: No CP/SOB  ENDO: No S&Sx of hypoglycemia.       MEDS:  insulin lispro (HumaLOG) corrective regimen sliding scale   SubCutaneous at bedtime  insulin lispro (HumaLOG) corrective regimen sliding scale   SubCutaneous three times a day before meals  insulin glargine Injectable (LANTUS) 28 Unit(s) SubCutaneous at bedtime  insulin lispro Injectable (HumaLOG) 16 Unit(s) SubCutaneous three times a day before meals (requiring 18 to 22 total units ac meals)  dexamethasone     Tablet 2 milliGRAM(s) Oral two times a day      Allergies    No Known Allergies      PE:  General: Male lying in bed. NAD.   Vital Signs Last 24 Hrs  T(C): 37 (09-26-17 @ 11:59), Max: 37.2 (09-25-17 @ 21:03)  T(F): 98.6 (09-26-17 @ 11:59), Max: 98.9 (09-25-17 @ 21:03)  HR: 90 (09-26-17 @ 11:59) (64 - 95)  BP: 172/84 (09-26-17 @ 11:59) (159/71 - 172/84)  BP(mean): --  RR: 18 (09-26-17 @ 11:59) (18 - 18)  SpO2: 97% (09-26-17 @ 11:59) (97% - 99%)  Abd: Soft, Tender at touch,ND,   Extremities: Warm. No edema noted.   Neuro: A&O X3    LABS:  CAPILLARY BLOOD GLUCOSE  225 (26 Sep 2017 12:18)  77 (26 Sep 2017 08:11)  208 (25 Sep 2017 21:03)  253 (25 Sep 2017 16:43)  167 (25 Sep 2017 11:40)  160 (25 Sep 2017 07:36)  225 (24 Sep 2017 20:41)  138 (24 Sep 2017 17:28)  53 (24 Sep 2017 17:11)  57 (24 Sep 2017 17:08)  281 (24 Sep 2017 11:27)  89 (24 Sep 2017 08:04)  218 (23 Sep 2017 21:42)  335 (23 Sep 2017 17:10)                              11.6   6.34  )-----------( 126      ( 26 Sep 2017 07:46 )             34.9     09-26    133<L>  |  98  |  24<H>  ----------------------------<  103<H>  4.0   |  28  |  0.68    Ca    8.8      26 Sep 2017 07:54      TPro  8.2  /  Alb  3.1<L>  /  TBili  0.5  /  DBili  x   /  AST  53<H>  /  ALT  58<H>  /  AlkPhos  221<H>  09-22        Hemoglobin A1C, Whole Blood: 9.4 % <H> [4.0 - 5.6] (09-14-17 @ 07:44)

## 2017-09-26 NOTE — PROGRESS NOTE ADULT - SUBJECTIVE AND OBJECTIVE BOX
Patient is a 61y old  Male who presents with a chief complaint of Lethargy (15 Sep 2017 16:58)      SUBJECTIVE / OVERNIGHT EVENTS:  Walking without any symptoms.  Review of Systems:   CONSTITUTIONAL: No fever, weight loss, or fatigue  EYES: No eye pain, visual disturbances, or discharge  ENMT:  No difficulty hearing, tinnitus, vertigo; No sinus or throat pain  NECK: No pain or stiffness  BREASTS: No pain, masses, or nipple discharge  RESPIRATORY: No cough, wheezing, chills or hemoptysis; No shortness of breath  CARDIOVASCULAR: No chest pain, palpitations, dizziness, or leg swelling  GASTROINTESTINAL: No abdominal or epigastric pain. No nausea, vomiting, or hematemesis; No diarrhea or constipation. No melena or hematochezia.  GENITOURINARY: No dysuria, frequency, hematuria, or incontinence  NEUROLOGICAL: No headaches, memory loss, loss of strength, numbness, or tremors  SKIN: No itching, burning, rashes, or lesions   LYMPH NODES: No enlarged glands  ENDOCRINE: No heat or cold intolerance; No hair loss  MUSCULOSKELETAL: No joint pain or swelling; No muscle, back, or extremity pain  PSYCHIATRIC: No depression, anxiety, mood swings, or difficulty sleeping  HEME/LYMPH: No easy bruising, or bleeding gums  ALLERY AND IMMUNOLOGIC: No hives or eczema    MEDICATIONS  (STANDING):  insulin lispro (HumaLOG) corrective regimen sliding scale   SubCutaneous at bedtime  dextrose 5%. 1000 milliLiter(s) (50 mL/Hr) IV Continuous <Continuous>  dextrose 50% Injectable 12.5 Gram(s) IV Push once  dextrose 50% Injectable 25 Gram(s) IV Push once  dextrose 50% Injectable 25 Gram(s) IV Push once  pantoprazole    Tablet 40 milliGRAM(s) Oral before breakfast  insulin lispro (HumaLOG) corrective regimen sliding scale   SubCutaneous three times a day before meals  dexamethasone     Tablet 2 milliGRAM(s) Oral two times a day  lactulose Syrup 20 Gram(s) Oral two times a day  insulin glargine Injectable (LANTUS) 22 Unit(s) SubCutaneous at bedtime  insulin lispro Injectable (HumaLOG) 18 Unit(s) SubCutaneous three times a day before meals  methadone    Tablet 10 milliGRAM(s) Oral <User Schedule>  HYDROmorphone   Tablet 2 milliGRAM(s) Oral every 6 hours    MEDICATIONS  (PRN):  ondansetron    Tablet 4 milliGRAM(s) Oral two times a day PRN Nausea and/or Vomiting  dextrose Gel 1 Dose(s) Oral once PRN Blood Glucose LESS THAN 70 milliGRAM(s)/deciliter  glucagon  Injectable 1 milliGRAM(s) IntraMuscular once PRN Glucose LESS THAN 70 milligrams/deciliter  naloxone Injectable 0.1 milliGRAM(s) IV Push every 3 minutes PRN Sedation or respiratory depression RR < 10  OLANZapine Disintegrating Tablet 5 milliGRAM(s) Oral every 12 hours PRN Agitation  HYDROmorphone   Tablet 2 milliGRAM(s) Oral every 4 hours PRN Breakthrough pain      PHYSICAL EXAM:  Vital Signs Last 24 Hrs  T(C): 37 (26 Sep 2017 11:59), Max: 37.2 (25 Sep 2017 21:03)  T(F): 98.6 (26 Sep 2017 11:59), Max: 98.9 (25 Sep 2017 21:03)  HR: 90 (26 Sep 2017 11:59) (64 - 95)  BP: 172/84 (26 Sep 2017 11:59) (159/71 - 172/84)  BP(mean): --  RR: 18 (26 Sep 2017 11:59) (18 - 18)  SpO2: 97% (26 Sep 2017 11:59) (97% - 99%)  I&O's Summary    25 Sep 2017 07:01  -  26 Sep 2017 07:00  --------------------------------------------------------  IN: 1440 mL / OUT: 0 mL / NET: 1440 mL    26 Sep 2017 07:01  -  26 Sep 2017 18:11  --------------------------------------------------------  IN: 600 mL / OUT: 0 mL / NET: 600 mL      GENERAL: NAD, well-developed  HEAD:  Atraumatic, Normocephalic  EYES: EOMI, PERRLA, conjunctiva and sclera clear  NECK: Supple, No JVD  CHEST/LUNG: Clear to auscultation bilaterally; No wheeze  HEART: Regular rate and rhythm; No murmurs, rubs, or gallops  ABDOMEN: Soft, Nontender, Nondistended; Bowel sounds present  EXTREMITIES:  2+ Peripheral Pulses, No clubbing, cyanosis, or edema  PSYCH: AAOx3  NEUROLOGY: non-focal  SKIN: No rashes or lesions    LABS:  CAPILLARY BLOOD GLUCOSE  167 (26 Sep 2017 16:57)  225 (26 Sep 2017 12:18)  77 (26 Sep 2017 08:11)  208 (25 Sep 2017 21:03)                              11.6   6.34  )-----------( 126      ( 26 Sep 2017 07:46 )             34.9     09-26    133<L>  |  98  |  24<H>  ----------------------------<  103<H>  4.0   |  28  |  0.68    Ca    8.8      26 Sep 2017 07:54                RADIOLOGY & ADDITIONAL TESTS:    Imaging Personally Reviewed:    Consultant(s) Notes Reviewed:      Care Discussed with Consultants/Other Providers:

## 2017-09-26 NOTE — PROGRESS NOTE ADULT - PROBLEM SELECTOR PLAN 3
Unclear etiology, decreased doses of opioids. Pending Utox. Zyprexa 5 mg q 12 PRN Improving. Currently without evident delirium

## 2017-09-26 NOTE — PROVIDER CONTACT NOTE (MEDICATION) - ACTION/TREATMENT ORDERED:
per LEO Wilkerson, pt to get x1 dose ativan, continue to monior
none at this moment.
push medication back a few hours and retry

## 2017-09-26 NOTE — PROGRESS NOTE ADULT - PROBLEM SELECTOR PLAN 1
-test BG AC/HS  -Decrease lantus to 22 units QHS  -Increase Humalog 18 units AC meals  -c/w Humalog moderate correction scale AC and Mod HS scale  -Plan discussed with pt/team/hospice team.  Contact info: [phone] (24/7). pager [phone]

## 2017-09-26 NOTE — PROGRESS NOTE ADULT - ASSESSMENT
60 y/o w/m h/o liver CA, pancreatitis, Hep-C , HTN ,IDDM admitted with Delirium in the setting of prescription medications abuse. Consult called for symptomatic Rx (pain/Anxiety) and resources (hospice referral). Today with confusion. 60 y/o w/m h/o liver CA, pancreatitis, Hep-C , HTN ,IDDM admitted with Delirium in the setting of prescription medications abuse. Consult called for symptomatic Rx (pain/Anxiety) and resources (hospice referral). Today without confusion and with controlled pain.

## 2017-09-26 NOTE — PROGRESS NOTE ADULT - SUBJECTIVE AND OBJECTIVE BOX
HPI:  60 y/o w/m h/o liver CA, pancreatitis, Hep-C , HTN ,IDDM. was at inpatient Hospice and was found wandering in another patients room and smearing feces according to EMS. As [facility] nurse liaison, patient was found to have Temazepam and MS Contin that he appeared to be taking without informing the providers at inpatient hospice.  At the same time the patient was on a Dilaudid PCA and as per hospice, the patient may have become Delirious because of intoxication due to opioids and benzodiazepines.     Palliative Following for symptom management - Pain. Patient evaluated at bedside, asking when he can go home. Indicates that his pain is tolerable. Denies any other complaints. Plan is for transfer to PCU for symptom management.    PERTINENT PMH REVIEWED:  [x ] YES [ ] NO           SOCIAL HISTORY:   Significant other/partner:               Children:    2               Yarsanism/Spirituality: Temple   Substance hx:  [ ] YES   [x] NO                   Tobacco hx:  [ x] YES  [ ] NO                       Alcohol hx: [ ] YES  [ ] NO         Home Opioid hx:  [x ] YES  [ ] NO   Living Situation: [ x] Home  [ ] Long term care  [ ] Rehab [ ] Other  Patient indicated history of polysubstance abuse (Benzodiazepines and when he was young Cocaine)     FAMILY HISTORY:  No pertinent family history in first degree relatives    [x ] Family history non-contributory     BASELINE (I)ADLs (prior to admission):  [place]: [ ] total  [x ] moderate [ ] dependent    ADVANCE DIRECTIVES:    DNR [x ] YES [ ] NO                            [x ] Completed  MOLST  [ ] YES [x ] NO                      [ ] Completed  Health Care Proxy [ x] YES  [ ] NO   [ ] Completed.   Living Will  [ ] YES [ ] NO             [ ] Surrogate  [ ] HCP  [ ] Guardian:      As per patient  Nevaeh Serrano is his HCP      (However, not paperwork is available)  Phone#:  [phone]  Other contacts: Friend Moreno ISAAC [phone]    Allergies    No Known Allergies    Intolerances    MEDICATIONS  (STANDING):  insulin lispro (HumaLOG) corrective regimen sliding scale   SubCutaneous at bedtime  dextrose 5%. 1000 milliLiter(s) (50 mL/Hr) IV Continuous <Continuous>  dextrose 50% Injectable 12.5 Gram(s) IV Push once  dextrose 50% Injectable 25 Gram(s) IV Push once  dextrose 50% Injectable 25 Gram(s) IV Push once  pantoprazole    Tablet 40 milliGRAM(s) Oral before breakfast  insulin lispro (HumaLOG) corrective regimen sliding scale   SubCutaneous three times a day before meals  HYDROmorphone  Injectable 0.5 milliGRAM(s) IV Push every 6 hours  dexamethasone     Tablet 2 milliGRAM(s) Oral two times a day  lactulose Syrup 20 Gram(s) Oral two times a day  insulin glargine Injectable (LANTUS) 22 Unit(s) SubCutaneous at bedtime  insulin lispro Injectable (HumaLOG) 18 Unit(s) SubCutaneous three times a day before meals  methadone    Tablet 10 milliGRAM(s) Oral <User Schedule>    MEDICATIONS  (PRN):  ondansetron    Tablet 4 milliGRAM(s) Oral two times a day PRN Nausea and/or Vomiting  dextrose Gel 1 Dose(s) Oral once PRN Blood Glucose LESS THAN 70 milliGRAM(s)/deciliter  glucagon  Injectable 1 milliGRAM(s) IntraMuscular once PRN Glucose LESS THAN 70 milligrams/deciliter  naloxone Injectable 0.1 milliGRAM(s) IV Push every 3 minutes PRN Sedation or respiratory depression RR < 10  HYDROmorphone  Injectable 1 milliGRAM(s) IV Push every 3 hours PRN Breakthrough pain  OLANZapine Disintegrating Tablet 5 milliGRAM(s) Oral every 12 hours PRN Agitation      PRESENT SYMPTOMS:  Source: [x ] Patient   [ ] Family   [ ] Team     Pain:                        [ ] No [x ] Yes             [ ] Mild [x ] Moderate [  ]    Onset - chronic   Location - RUQ and Epigastric region   Duration - Constant   Character - Cramping, Stabbing    Alleviating/Aggravating - Dilaudid   Radiation - band like towards LUQ   Timing - none      Dyspnea:                [x ] No [ ] Yes             [ ] Mild [ ] Moderate [ ] Severe    Anxiety:                  [ ] No [x ] Yes             [ ] Mild [x ] Moderate [ ] Severe    Fatigue:                  [ ] No [x ] Yes             [ ] Mild [x ] Moderate [ ] Severe    Nausea:                  [x ] No [ ] Yes             [ ] Mild [ ] Moderate [ ] Severe    Loss of appetite:   [ ] No [x ] Yes             [x ] Mild [ ] Moderate [ ] Severe    Constipation:        [x ] No [ ] Yes             [ ] Mild [ ] Moderate [ ] Severe    Other Symptoms:  [x ] All other review of systems negative   [ ] Unable to obtain due to poor mentation     Karnofsky Performance Score/Palliative Performance Status Version 2:  30-40 %    PHYSICAL EXAM:  Vital Signs Last 24 Hrs  T(C): 37 (26 Sep 2017 11:59), Max: 37.2 (25 Sep 2017 21:03)  T(F): 98.6 (26 Sep 2017 11:59), Max: 98.9 (25 Sep 2017 21:03)  HR: 90 (26 Sep 2017 11:59) (64 - 95)  BP: 172/84 (26 Sep 2017 11:59) (159/71 - 172/84)  BP(mean): --  RR: 18 (26 Sep 2017 11:59) (18 - 18)  SpO2: 97% (26 Sep 2017 11:59) (97% - 99%)  General:  [ x ]  Alert but confused. Oriented to person and place.     HEENT:  [ ] Normal   [x ] Dry mouth   [ ] ET Tube    [ ] Trach  [ ] Oral lesions    Lungs:   [x ] Clear [ ] Tachypnea  [ ] Audible excessive secretions   [ ] Rhonchi        [ ] Right [ ] Left [ ] Bilateral  [ ] Crackles        [ ] Right [ ] Left [ ] Bilateral  [ ] Wheezing     [ ] Right [ ] Left [ ] Bilateral    Cardiovascular:  S1, S2. No S3. No murmurs     Abdomen: [ ] Soft  [x ] Distended   [x ] +BS x 4  [ ] Non tender [x ] Tender over RUQ, Epigastric region, and maci-umbilical area [ ]PEG   [ ]OGT/ NGT   Last BM:   9/25    Genitourinary: [x ] Normal [ ] Incontinent   [ ] Oliguria/Anuria   [ ] Alatorre    Musculoskeletal:  [ ] Normal   [x ] Weakness  [ ] Bedbound/Wheelchair bound [ ] Edema    Neurological: [ ] No focal deficits  [ ] Cognitive impairment  [ ] Dysphagia [ ] Dysarthria [ ] Paresis [ ] Other. Confused.      Skin: [x ] Normal   [ ] Pressure ulcer(s)                  [ ] Rash    LABS:                        12.6   8.8   )-----------( 146      ( 22 Sep 2017 17:18 )             37.0     09-22    134<L>  |  96  |  30<H>  ----------------------------<  298<H>  4.4   |  29  |  0.76    Ca    8.7      22 Sep 2017 17:18    TPro  8.2  /  Alb  3.1<L>  /  TBili  0.5  /  DBili  x   /  AST  53<H>  /  ALT  58<H>  /  AlkPhos  221<H>  09    Shock: No [ ] Septic [ ] Cardiogenic [ ] Neurologic [ ] Hypovolemic  Vasopressors x None  Inotrophs x None    Protein Calorie Malnutrition: [ ] Mild [x ] Moderate [ ] Severe    Oral Intake: [ ] Unable/mouth care only [x ] Minimal [ ] Moderate [ ] Full Capability  Diet: [ ] NPO [ ] Tube feeds [ ] TPN [x ] Other     RADIOLOGY & ADDITIONAL STUDIES: reviewed.    REFERRALS:   [ ] Chaplaincy  [ ] Hospice  [ ] Child Life  [ ] Social Work  [ ] Case management [ ] Holistic Therapy HPI:  60 y/o w/m h/o liver CA, pancreatitis, Hep-C , HTN ,IDDM. was at inpatient Hospice and was found wandering in another patients room and smearing feces according to EMS. As [facility] lia[facility], patient was found to have Temazepam and MS Contin that he appeared to be taking without informing the providers at inpatient hospice.  At the same time the patient was on a Dilaudid PCA and as per hospice, the patient may have become Delirious because of intoxication due to opioids and benzodiazepines.     Today, the patient is alert and with it. He is able to indicate his pain is well controlled at the point that he has been able to walk around the unit without significant impairment. He indicates he wants to go home.     PERTINENT PMH REVIEWED:  [x ] YES [ ] NO           SOCIAL HISTORY:   Significant other/partner:               Children:    2               Jew/Spirituality: [employer]   Substance hx:  [ ] YES   [x] NO                   Tobacco hx:  [ x] YES  [ ] NO                       Alcohol hx: [x ] YES  [ ] NO> Patient indicates he has been an AA member for several years.          Home Opioid hx:  [x ] YES  [ ] NO   Living Situation: [ x] Home  [ ] Long term care  [ ] Rehab [ ] Other  Patient indicated history of polysubstance abuse (Benzodiazepines and when he was young Cocaine)     FAMILY HISTORY:  No pertinent family history in first degree relatives    [x ] Family history non-contributory     BASELINE (I)ADLs (prior to admission):  [place]: [ ] total  [x ] moderate [ ] dependent    ADVANCE DIRECTIVES:    DNR [x ] YES [ ] NO                            [x ] Completed  MOLST  [ ] YES [x ] NO                      [ ] Completed  Health Care Proxy [ x] YES  [ ] NO   [ ] Completed.   Living Will  [ ] YES [ ] NO             [ ] Surrogate  [ ] HCP  [ ] Guardian:      As per patient  Nevaeh Serrano is his HCP      (However, not paperwork is available)  Phone#:  [phone]  Other contacts: Friend Moreno ISAAC [phone]    Allergies    No Known Allergies    Intolerances    MEDICATIONS  (STANDING):  insulin lispro (HumaLOG) corrective regimen sliding scale   SubCutaneous at bedtime  dextrose 5%. 1000 milliLiter(s) (50 mL/Hr) IV Continuous <Continuous>  dextrose 50% Injectable 12.5 Gram(s) IV Push once  dextrose 50% Injectable 25 Gram(s) IV Push once  dextrose 50% Injectable 25 Gram(s) IV Push once  pantoprazole    Tablet 40 milliGRAM(s) Oral before breakfast  insulin lispro (HumaLOG) corrective regimen sliding scale   SubCutaneous three times a day before meals  HYDROmorphone  Injectable 0.5 milliGRAM(s) IV Push every 6 hours  dexamethasone     Tablet 2 milliGRAM(s) Oral two times a day  lactulose Syrup 20 Gram(s) Oral two times a day  insulin glargine Injectable (LANTUS) 22 Unit(s) SubCutaneous at bedtime  insulin lispro Injectable (HumaLOG) 18 Unit(s) SubCutaneous three times a day before meals  methadone    Tablet 10 milliGRAM(s) Oral <User Schedule>    MEDICATIONS  (PRN):  ondansetron    Tablet 4 milliGRAM(s) Oral two times a day PRN Nausea and/or Vomiting  dextrose Gel 1 Dose(s) Oral once PRN Blood Glucose LESS THAN 70 milliGRAM(s)/deciliter  glucagon  Injectable 1 milliGRAM(s) IntraMuscular once PRN Glucose LESS THAN 70 milligrams/deciliter  naloxone Injectable 0.1 milliGRAM(s) IV Push every 3 minutes PRN Sedation or respiratory depression RR < 10  HYDROmorphone  Injectable 1 milliGRAM(s) IV Push every 3 hours PRN Breakthrough pain  OLANZapine Disintegrating Tablet 5 milliGRAM(s) Oral every 12 hours PRN Agitation      PRESENT SYMPTOMS:  Source: [x ] Patient   [ ] Family   [ ] Team     Pain:                        [ ] No [x ] Yes             [x ] Moderate     Onset - chronic   Location - RUQ and Epigastric region   Duration - Constant   Character - Cramping, Stabbing    Alleviating/Aggravating - Dilaudid   Radiation - band like towards LUQ   Timing - none      Dyspnea:                [x ] No [ ] Yes             [ ] Mild [ ] Moderate [ ] Severe    Anxiety:                  [ ] No [x ] Yes             [ ] Mild [x ] Moderate [ ] Severe    Fatigue:                  [ ] No [x ] Yes             [ ] Mild [x ] Moderate [ ] Severe    Nausea:                  [x ] No [ ] Yes             [ ] Mild [ ] Moderate [ ] Severe    Loss of appetite:   [ ] No [x ] Yes             [x ] Mild [ ] Moderate [ ] Severe    Constipation:        [x ] No [ ] Yes             [ ] Mild [ ] Moderate [ ] Severe    Other Symptoms:  [x ] All other review of systems negative   [ ] Unable to obtain due to poor mentation     Karnofsky Performance Score/Palliative Performance Status Version 2:  30-40 %    PHYSICAL EXAM:  Vital Signs Last 24 Hrs  T(C): 37 (26 Sep 2017 11:59), Max: 37.2 (25 Sep 2017 21:03)  T(F): 98.6 (26 Sep 2017 11:59), Max: 98.9 (25 Sep 2017 21:03)  HR: 90 (26 Sep 2017 11:59) (64 - 95)  BP: 172/84 (26 Sep 2017 11:59) (159/71 - 172/84)  BP(mean): --  RR: 18 (26 Sep 2017 11:59) (18 - 18)  SpO2: 97% (26 Sep 2017 11:59) (97% - 99%)  General:  [ x ]  Alert but confused. Oriented to person and place.     HEENT:  [ ] Normal   [x ] Dry mouth   [ ] ET Tube    [ ] Trach  [ ] Oral lesions    Lungs:   [x ] Clear [ ] Tachypnea  [ ] Audible excessive secretions   [ ] Rhonchi        [ ] Right [ ] Left [ ] Bilateral  [ ] Crackles        [ ] Right [ ] Left [ ] Bilateral  [ ] Wheezing     [ ] Right [ ] Left [ ] Bilateral    Cardiovascular:  S1, S2. No S3. No murmurs     Abdomen: [ ] Soft  [x ] Distended   [x ] +BS x 4  [ ] Non tender [x ] Tender over RUQ, Epigastric region, and maci-umbilical area [ ]PEG   [ ]OGT/ NGT   Last BM:   9/25    Genitourinary: [x ] Normal [ ] Incontinent   [ ] Oliguria/Anuria   [ ] Alatorre    Musculoskeletal:  [ ] Normal   [x ] Weakness  [ ] Bedbound/Wheelchair bound [ ] Edema    Neurological: [ ] No focal deficits  [ ] Cognitive impairment  [ ] Dysphagia [ ] Dysarthria [ ] Paresis [ ] Other. Confused.      Skin: [x ] Normal   [ ] Pressure ulcer(s)                  [ ] Rash    LABS:                                     11.6   6.34  )-----------( 126      ( 26 Sep 2017 07:46 )             34.9   09-26    133<L>  |  98  |  24<H>  ----------------------------<  103<H>  4.0   |  28  |  0.68    Ca    8.8      26 Sep 2017 07:54        Shock: No [ ] Septic [ ] Cardiogenic [ ] Neurologic [ ] Hypovolemic  Vasopressors x None  Inotrophs x None    Protein Calorie Malnutrition: [ ] Mild [x ] Moderate [ ] Severe    Oral Intake: [ ] Unable/mouth care only [x ] Minimal [ ] Moderate [ ] Full Capability  Diet: [ ] NPO [ ] Tube feeds [ ] TPN [x ] Other     RADIOLOGY & ADDITIONAL STUDIES: reviewed.    REFERRALS:   [ ] Chaplaincy  [ ] Hospice  [ ] Child Life  [ ] Social Work  [ ] Case management [ ] Holistic Therapy

## 2017-09-26 NOTE — PROGRESS NOTE ADULT - ASSESSMENT
62 y/o M w/h/o uncontrolled type 2 DM in hospice care secondary to liver ca/pancreatitis/Hep C. Here from hospice with AMS and hyperglycemiA. Pt on steroids. Pt tolerating POs with low fasting BG and prandial hyperglycemia. No hypoglycemia. Pt wants to go home. If pt going home he will require direct supervision/assistance with insulin regimen since pt gets easily confused regarding what to do. Spoke to pt and primary team. Can consider and simplify regimen to Levemir(pt was taking this med at home) plus Prandin ac meals instead of Humalog ac meals but still need to make sure pt is able to follow and take meds safely. BG goal is to prevent hypoglycemia and persistent hyperglycemia since pt's BGs were >500s upon admission.

## 2017-09-27 LAB
ANION GAP SERPL CALC-SCNC: 9 MMOL/L — SIGNIFICANT CHANGE UP (ref 5–17)
BUN SERPL-MCNC: 25 MG/DL — HIGH (ref 7–23)
CALCIUM SERPL-MCNC: 8.4 MG/DL — SIGNIFICANT CHANGE UP (ref 8.4–10.5)
CHLORIDE SERPL-SCNC: 99 MMOL/L — SIGNIFICANT CHANGE UP (ref 96–108)
CO2 SERPL-SCNC: 28 MMOL/L — SIGNIFICANT CHANGE UP (ref 22–31)
CREAT SERPL-MCNC: 0.68 MG/DL — SIGNIFICANT CHANGE UP (ref 0.5–1.3)
GLUCOSE SERPL-MCNC: 124 MG/DL — HIGH (ref 70–99)
HCT VFR BLD CALC: 34.2 % — LOW (ref 39–50)
HGB BLD-MCNC: 11.7 G/DL — LOW (ref 13–17)
MCHC RBC-ENTMCNC: 33.5 PG — SIGNIFICANT CHANGE UP (ref 27–34)
MCHC RBC-ENTMCNC: 34.2 GM/DL — SIGNIFICANT CHANGE UP (ref 32–36)
MCV RBC AUTO: 98 FL — SIGNIFICANT CHANGE UP (ref 80–100)
PLATELET # BLD AUTO: 106 K/UL — LOW (ref 150–400)
POTASSIUM SERPL-MCNC: 4.1 MMOL/L — SIGNIFICANT CHANGE UP (ref 3.5–5.3)
POTASSIUM SERPL-SCNC: 4.1 MMOL/L — SIGNIFICANT CHANGE UP (ref 3.5–5.3)
RBC # BLD: 3.49 M/UL — LOW (ref 4.2–5.8)
RBC # FLD: 16.5 % — HIGH (ref 10.3–14.5)
SODIUM SERPL-SCNC: 136 MMOL/L — SIGNIFICANT CHANGE UP (ref 135–145)
WBC # BLD: 6.68 K/UL — SIGNIFICANT CHANGE UP (ref 3.8–10.5)
WBC # FLD AUTO: 6.68 K/UL — SIGNIFICANT CHANGE UP (ref 3.8–10.5)

## 2017-09-27 PROCEDURE — 70450 CT HEAD/BRAIN W/O DYE: CPT | Mod: 26

## 2017-09-27 PROCEDURE — 99233 SBSQ HOSP IP/OBS HIGH 50: CPT

## 2017-09-27 PROCEDURE — 99232 SBSQ HOSP IP/OBS MODERATE 35: CPT

## 2017-09-27 RX ORDER — SODIUM CHLORIDE 9 MG/ML
1000 INJECTION, SOLUTION INTRAVENOUS
Qty: 0 | Refills: 0 | Status: DISCONTINUED | OUTPATIENT
Start: 2017-09-27 | End: 2017-09-28

## 2017-09-27 RX ORDER — INSULIN LISPRO 100/ML
9 VIAL (ML) SUBCUTANEOUS
Qty: 0 | Refills: 0 | Status: DISCONTINUED | OUTPATIENT
Start: 2017-09-27 | End: 2017-09-27

## 2017-09-27 RX ORDER — INSULIN GLARGINE 100 [IU]/ML
15 INJECTION, SOLUTION SUBCUTANEOUS AT BEDTIME
Qty: 0 | Refills: 0 | Status: DISCONTINUED | OUTPATIENT
Start: 2017-09-27 | End: 2017-09-28

## 2017-09-27 RX ORDER — INSULIN LISPRO 100/ML
VIAL (ML) SUBCUTANEOUS
Qty: 0 | Refills: 0 | Status: DISCONTINUED | OUTPATIENT
Start: 2017-09-27 | End: 2017-09-28

## 2017-09-27 RX ORDER — INSULIN LISPRO 100/ML
5 VIAL (ML) SUBCUTANEOUS
Qty: 0 | Refills: 0 | Status: DISCONTINUED | OUTPATIENT
Start: 2017-09-27 | End: 2017-09-27

## 2017-09-27 RX ORDER — INSULIN LISPRO 100/ML
5 VIAL (ML) SUBCUTANEOUS
Qty: 0 | Refills: 0 | Status: DISCONTINUED | OUTPATIENT
Start: 2017-09-27 | End: 2017-09-28

## 2017-09-27 RX ORDER — INSULIN LISPRO 100/ML
VIAL (ML) SUBCUTANEOUS AT BEDTIME
Qty: 0 | Refills: 0 | Status: DISCONTINUED | OUTPATIENT
Start: 2017-09-27 | End: 2017-09-28

## 2017-09-27 RX ADMIN — Medication 1 MILLIGRAM(S): at 00:30

## 2017-09-27 RX ADMIN — SODIUM CHLORIDE 50 MILLILITER(S): 9 INJECTION, SOLUTION INTRAVENOUS at 13:22

## 2017-09-27 RX ADMIN — Medication 1 MILLIGRAM(S): at 05:21

## 2017-09-27 RX ADMIN — HYDROMORPHONE HYDROCHLORIDE 2 MILLIGRAM(S): 2 INJECTION INTRAMUSCULAR; INTRAVENOUS; SUBCUTANEOUS at 00:45

## 2017-09-27 RX ADMIN — Medication 1 MILLIGRAM(S): at 22:30

## 2017-09-27 NOTE — CHART NOTE - NSCHARTNOTEFT_GEN_A_CORE
Pt. seen s/p code stroke call. Pt. is s/p unwitnessed fall with possible head injury. Pt. is still lethargic and A&Ox0 s/p being given Ativan 1 mg IV x 2 doses given in the ED. Pt. is currently nonverbal and sleeping. Vital signs are currently stable. Preliminary read of CT of the head is negative. Awaiting official report. Pt. placed on 1:1 constant observation. Will continue to monitor pt. throughout the night. F/u w/ primary team in AM.    -Mirella Wilkerson PA-C. #[id number].

## 2017-09-27 NOTE — PROGRESS NOTE ADULT - PROBLEM SELECTOR PLAN 4
If not HCP form is available then his surrogates will be his children.   If the patient is to recover his mental status and become stable, LTC with hospice is the best option for this patient that requires detention care and appropriate symptomatic Rx. If he becomes stable and LTC is not possible and if his family compromises to be available for the patient 24/7 then that will be a possibility. Finally, if the patients condition were to further decline and if his symptoms were to become difficult to manage, inpatient hospice or PCU may be re-evaluated again.  Patient is DNR/I

## 2017-09-27 NOTE — CONSULT NOTE ADULT - PROBLEM SELECTOR RECOMMENDATION 9
Likely secondary to multiple sedating medications.   Check ammonia level.   No further neurologic imaging.

## 2017-09-27 NOTE — PROGRESS NOTE ADULT - SUBJECTIVE AND OBJECTIVE BOX
Patient is a 61y old  Male who presents with a chief complaint of Lethargy (15 Sep 2017 16:58)      SUBJECTIVE / OVERNIGHT EVENTS:  Lethargic since receiving ativan   Review of Systems:   CONSTITUTIONAL: No fever, weight loss, or fatigue  EYES: No eye pain, visual disturbances, or discharge  ENMT:  No difficulty hearing, tinnitus, vertigo; No sinus or throat pain  NECK: No pain or stiffness  BREASTS: No pain, masses, or nipple discharge  RESPIRATORY: No cough, wheezing, chills or hemoptysis; No shortness of breath  CARDIOVASCULAR: No chest pain, palpitations, dizziness, or leg swelling  GASTROINTESTINAL: No abdominal or epigastric pain. No nausea, vomiting, or hematemesis; No diarrhea or constipation. No melena or hematochezia.  GENITOURINARY: No dysuria, frequency, hematuria, or incontinence  NEUROLOGICAL: No headaches, memory loss, loss of strength, numbness, or tremors  SKIN: No itching, burning, rashes, or lesions   LYMPH NODES: No enlarged glands  ENDOCRINE: No heat or cold intolerance; No hair loss  MUSCULOSKELETAL: No joint pain or swelling; No muscle, back, or extremity pain  PSYCHIATRIC: No depression, anxiety, mood swings, or difficulty sleeping  HEME/LYMPH: No easy bruising, or bleeding gums  ALLERY AND IMMUNOLOGIC: No hives or eczema    MEDICATIONS  (STANDING):  dextrose 5%. 1000 milliLiter(s) (50 mL/Hr) IV Continuous <Continuous>  dextrose 50% Injectable 12.5 Gram(s) IV Push once  dextrose 50% Injectable 25 Gram(s) IV Push once  dextrose 50% Injectable 25 Gram(s) IV Push once  pantoprazole    Tablet 40 milliGRAM(s) Oral before breakfast  dexamethasone     Tablet 2 milliGRAM(s) Oral two times a day  lactulose Syrup 20 Gram(s) Oral two times a day  methadone    Tablet 10 milliGRAM(s) Oral <User Schedule>  HYDROmorphone   Tablet 2 milliGRAM(s) Oral every 6 hours  insulin lispro (HumaLOG) corrective regimen sliding scale   SubCutaneous at bedtime  insulin lispro (HumaLOG) corrective regimen sliding scale   SubCutaneous three times a day before meals  insulin glargine Injectable (LANTUS) 15 Unit(s) SubCutaneous at bedtime  insulin lispro Injectable (HumaLOG) 5 Unit(s) SubCutaneous three times a day with meals  dextrose 5% + sodium chloride 0.45%. 1000 milliLiter(s) (50 mL/Hr) IV Continuous <Continuous>    MEDICATIONS  (PRN):  ondansetron    Tablet 4 milliGRAM(s) Oral two times a day PRN Nausea and/or Vomiting  dextrose Gel 1 Dose(s) Oral once PRN Blood Glucose LESS THAN 70 milliGRAM(s)/deciliter  glucagon  Injectable 1 milliGRAM(s) IntraMuscular once PRN Glucose LESS THAN 70 milligrams/deciliter  naloxone Injectable 0.1 milliGRAM(s) IV Push every 3 minutes PRN Sedation or respiratory depression RR < 10  OLANZapine Disintegrating Tablet 5 milliGRAM(s) Oral every 12 hours PRN Agitation  HYDROmorphone   Tablet 2 milliGRAM(s) Oral every 4 hours PRN Breakthrough pain  LORazepam     Tablet 1 milliGRAM(s) Oral every 8 hours PRN Anxiety      PHYSICAL EXAM:  Vital Signs Last 24 Hrs  T(C): 36.7 (27 Sep 2017 14:47), Max: 37.3 (27 Sep 2017 06:57)  T(F): 98 (27 Sep 2017 14:47), Max: 99.1 (27 Sep 2017 06:57)  HR: 73 (27 Sep 2017 14:47) (68 - 97)  BP: 143/64 (27 Sep 2017 14:47) (127/65 - 164/92)  BP(mean): --  RR: 98 (27 Sep 2017 14:47) (18 - 98)  SpO2: 98% (27 Sep 2017 11:04) (95% - 98%)  I&O's Summary    26 Sep 2017 07:01  -  27 Sep 2017 07:00  --------------------------------------------------------  IN: 1440 mL / OUT: 0 mL / NET: 1440 mL    27 Sep 2017 07:01  -  27 Sep 2017 20:08  --------------------------------------------------------  IN: 390 mL / OUT: 0 mL / NET: 390 mL      GENERAL: NAD, well-developed  HEAD:  Atraumatic, Normocephalic  EYES: EOMI, PERRLA, conjunctiva and sclera clear  NECK: Supple, No JVD  CHEST/LUNG: Clear to auscultation bilaterally; No wheeze  HEART: Regular rate and rhythm; No murmurs, rubs, or gallops  ABDOMEN: Soft, Nontender, Nondistended; Bowel sounds present  EXTREMITIES:  2+ Peripheral Pulses, No clubbing, cyanosis, or edema  PSYCH: Lethargic  NEUROLOGY: non-focal  SKIN: No rashes or lesions    LABS:  CAPILLARY BLOOD GLUCOSE  93 (27 Sep 2017 17:27)  76 (27 Sep 2017 14:47)  78 (27 Sep 2017 12:08)  98 (27 Sep 2017 07:37)  161 (27 Sep 2017 03:33)  141 (26 Sep 2017 21:23)  129 (26 Sep 2017 21:22)  58 (26 Sep 2017 21:06)  55 (26 Sep 2017 21:04)                              11.7   6.68  )-----------( 106      ( 27 Sep 2017 07:57 )             34.2     09-27    136  |  99  |  25<H>  ----------------------------<  124<H>  4.1   |  28  |  0.68    Ca    8.4      27 Sep 2017 07:50                RADIOLOGY & ADDITIONAL TESTS:    Imaging Personally Reviewed:    Consultant(s) Notes Reviewed:      Care Discussed with Consultants/Other Providers:

## 2017-09-27 NOTE — CONSULT NOTE ADULT - ASSESSMENT
61 year old male with end stage liver cancer admitted for AMS with fall and subsequent episode of confusion following fall who is agitated and combative with negative CT head.

## 2017-09-27 NOTE — PROGRESS NOTE ADULT - ASSESSMENT
60 y/o M w/h/o uncontrolled type 2 DM in hospice care secondary to liver ca/pancreatitis/Hep C. Here from hospice with AMS and hyperglycemiA. Pt on steroids. Pt now s/p fall/AMS again with bedtime hypoglycemia, not eating today. Didn't receive Dexa dose this am with BG levels coming down and increasing risk for hypoglycemia. Will start D5 infusion to keep BG >100 and adjust insulin doses.

## 2017-09-27 NOTE — CONSULT NOTE ADULT - SUBJECTIVE AND OBJECTIVE BOX
Patient is a 61 year old man with liver cancer that was admitted 9/13/17 with altered mental status. Yesterday fell and noted subsequently to be agitated. He was then sedated for CT Head. No history of seizures. He has remained lethargic.    Medications include: Dilaudid                                  Methadone                                 Zyprexa                                   PMH: Liver CA           Pancreatitis           Hepatitis C           HTN           DM    Exam: Laying in bed with eyes closed, only groans, follows no commands           Pupils 3mm reaacive           No facial asymmetry           Neck supple          Motor tone and strength- resists equally all extremities         Uncooperative with rest of exam                          11.7   6.68  )-----------( 106      ( 27 Sep 2017 07:57 )             34.2   09-27    136  |  99  |  25<H>  ----------------------------<  124<H>  4.1   |  28  |  0.68    Ca    8.4      27 Sep 2017 07:50    Ammonia, Serum: 151: Ammonia levels will be falsely elevated if specimen is NOT collected,  processed and maintained at 4-8 C umol/L (09.26.17 @ 06:21)        EXAM:  CT BRAIN                            PROCEDURE DATE:  09/27/2017          COMPARISON: CT head dated 9/13/2017, 9/3/2017.    FINDINGS:  There has been nointerval change.    Ventricles and sulci are within normal limits for the patient's stated   age. There are no new areas of attenuation abnormality within the brain.   There is no intraparenchymal hematoma, mass effect or midline shift. No   abnormalextra-axial fluid collections are present. There is no evidence   of acute transcortical territorial infarction.    The calvarium is intact. The visualized intraorbital compartments,   paranasal sinuses and tympanomastoid cavities appear free of acute   disease.    IMPRESSION:  No acute intracranial hemorrhage.  No interval change

## 2017-09-27 NOTE — CHART NOTE - NSCHARTNOTEFT_GEN_A_CORE
Pt lethargic today. Received Ativan last night, pre-med for agitation before CT scan. Constant observation discontinued.  Continue Enhance supervision. Vital signs stable. D5 1/2 NS started as discussed with Endocrinology since pt is not eating today. Will continue to monitor pt status. D/w Dr. Martini.

## 2017-09-27 NOTE — PROGRESS NOTE ADULT - ATTENDING COMMENTS
Patient's initial altered mental status could've been from toxic metabolic encephalopathy from opioid abuse as well as uncontrolled diabetes.
Hypoglycemia could be from liver failure, monitor closely
Patient's initial altered mental status could've been from toxic metabolic encephalopathy from opioid abuse as well as uncontrolled diabetes.
Patient's initial altered mental status could've been from toxic metabolic encephalopathy from opioid abuse as well as uncontrolled diabetes.

## 2017-09-27 NOTE — PROGRESS NOTE ADULT - ASSESSMENT
62 y/o w/m h/o liver CA, pancreatitis, Hep-C , HTN ,IDDM admitted with Delirium in the setting of prescription medications abuse. Consult called for symptomatic Rx (pain/Anxiety) and resources (hospice referral). Current he is sedated

## 2017-09-27 NOTE — PROVIDER CONTACT NOTE (OTHER) - ASSESSMENT
pt currently lethargic, aroused with verbal and physical stimulus. pt did not eat throughout day. pt on D5 + NS @ 50cc/h. pt to get 15 units of lantus at bedtime. no sliding scale coverage per orders.

## 2017-09-27 NOTE — PROVIDER CONTACT NOTE (OTHER) - ACTION/TREATMENT ORDERED:
no orders at this time continue to monitor. no orders at this time continue to monitor. Check fs with breakfast as per routine.

## 2017-09-27 NOTE — PROGRESS NOTE ADULT - PROBLEM SELECTOR PLAN 1
Will continue Methadone 10 mg 6/14/22  Will DC Dilaudid IV and start Dilaudid PO 2 mg q 4 PRN and will continue 2 mg q 6 ATC.  Naloxone PRN . Dexa to 2 mg BID.  If not able to tolerate PO meds, may transition to IV Methadone 5 mg 6/14/22 and Dilaudid 0.5 mg VI q 2 PRN   In several occasions,  I have d/w the patient about the importance of taking only  his mediations (including opiods and benzos) as prescribed and he gave verbalized understanding of it. He also understand that using home pain medications and benzos in the hospital may result in severe adverse effects.

## 2017-09-27 NOTE — PROGRESS NOTE ADULT - PROBLEM SELECTOR PROBLEM 5
Hyponatremia
Altered mental status, unspecified
Hyponatremia
Diabetic autonomic neuropathy associated with diabetes mellitus due to underlying condition
Hyponatremia
Hyponatremia
Altered mental status, unspecified
Altered mental status, unspecified
Hyponatremia

## 2017-09-27 NOTE — CONSULT NOTE ADULT - SUBJECTIVE AND OBJECTIVE BOX
Neurology Consult    Name  BROOKE BERGERON    61 year old male with history of liver cancer, DMII admitted with AMS with a code stroke called after patient sounded like he fell and then was confused after. At his baseline, he is alert and oriented x4 and his last known normal was when he recieved pain medications and ativan at 12 midnight. Currently patient is very agitated and combative.   Of note, patient is DNR/DNI and plans are in place to transfer him to palliative.  NIHSS 15 mRS 4                                                          MEDICATIONS  (STANDING):  insulin lispro (HumaLOG) corrective regimen sliding scale   SubCutaneous at bedtime  dextrose 5%. 1000 milliLiter(s) (50 mL/Hr) IV Continuous <Continuous>  dextrose 50% Injectable 12.5 Gram(s) IV Push once  dextrose 50% Injectable 25 Gram(s) IV Push once  dextrose 50% Injectable 25 Gram(s) IV Push once  pantoprazole    Tablet 40 milliGRAM(s) Oral before breakfast  insulin lispro (HumaLOG) corrective regimen sliding scale   SubCutaneous three times a day before meals  dexamethasone     Tablet 2 milliGRAM(s) Oral two times a day  lactulose Syrup 20 Gram(s) Oral two times a day  insulin glargine Injectable (LANTUS) 22 Unit(s) SubCutaneous at bedtime  insulin lispro Injectable (HumaLOG) 18 Unit(s) SubCutaneous three times a day before meals  methadone    Tablet 10 milliGRAM(s) Oral <User Schedule>  HYDROmorphone   Tablet 2 milliGRAM(s) Oral every 6 hours    MEDICATIONS  (PRN):  ondansetron    Tablet 4 milliGRAM(s) Oral two times a day PRN Nausea and/or Vomiting  dextrose Gel 1 Dose(s) Oral once PRN Blood Glucose LESS THAN 70 milliGRAM(s)/deciliter  glucagon  Injectable 1 milliGRAM(s) IntraMuscular once PRN Glucose LESS THAN 70 milligrams/deciliter  naloxone Injectable 0.1 milliGRAM(s) IV Push every 3 minutes PRN Sedation or respiratory depression RR < 10  OLANZapine Disintegrating Tablet 5 milliGRAM(s) Oral every 12 hours PRN Agitation  HYDROmorphone   Tablet 2 milliGRAM(s) Oral every 4 hours PRN Breakthrough pain  LORazepam     Tablet 1 milliGRAM(s) Oral every 8 hours PRN Anxiety      Allergies    No Known Allergies    Intolerances        Objective  Vital Signs Last 24 Hrs  T(C): 36.8 (27 Sep 2017 06:30), Max: 37.1 (27 Sep 2017 00:11)  T(F): 98.2 (27 Sep 2017 06:30), Max: 98.7 (27 Sep 2017 00:11)  HR: 68 (27 Sep 2017 06:30) (68 - 97)  BP: 127/65 (27 Sep 2017 06:30) (127/65 - 172/84)  BP(mean): --  RR: 18 (27 Sep 2017 06:30) (18 - 18)  SpO2: 98% (27 Sep 2017 06:30) (95% - 99%)    General Exam   General appearance: patient is agitated and combative, not re-directable  Respiratory:    non-labored respirations               Neurological Exam  Mental Status:  alert but not oriented, doesn't make or maintain eye contact, groaning and yelling in response to questions or touch    Cranial Nerves: pupils b/l 6mm, bilateral blink to threat, no facial droop, no dysarthria    Motor:   Tone:   normal               Strength: moving all 4 extremities equally and withdrawaing to pain    Other Studies    09-26    133<L>  |  98  |  24<H>  ----------------------------<  103<H>  4.0   |  28  |  0.68    Ca    8.8      26 Sep 2017 07:54      09-26    133<L>  |  98  |  24<H>  ----------------------------<  103<H>  4.0   |  28  |  0.68    Ca    8.8      26 Sep 2017 07:54          Radiology    CTh: < from: CT Head No Cont (09.13.17 @ 13:48) >    FINDINGS:      Ventricles and sulci are appropriate given patient's age. Scattered   parenchymal calcifications are noted.  There is no evidence of mass effect, hemorrhage, or midline shift. No   acute territorial infarct or degenerative gray-white matter junction.  A retention cyst versus polyp is noted in the left sphenoid sinus,   unchanged from CT dated 9/3/2017. The mastoids are clear.  There is no evidence of calvarial fracture. There is prominent   calcifications of the carotid siphons and bilateral vertebral arteries.      IMPRESSION:  No evidence of mass effect, midline shift, hemorrhage or   infarct noted. No evidence of metastatic disease.        < end of copied text >

## 2017-09-27 NOTE — PROGRESS NOTE ADULT - PROBLEM SELECTOR PLAN 1
-test BG AC/HS  -Test BG at 3 pm today  -Start D5 infusion at 50cc/hr since pt BG levels are coming down.  -Decrease lantus to15 units QHS  -Decrease Humalog dose to 5 units (ONLY give after patient eats at least 50% of meals) pt not getting steroids and not eating  -Decrease Humalog correction scales to low dose for now.  -Pt will likely need more insulin once he starts to eat again and receive Dexa doses.  -Discontinue D5 infusion once pt restarts eating and restarts Dexa.  -Plan discussed with pt/team. Will follow.  Contact info: [phone] (24/7). pager [phone]    -c/w Humalog moderate correction scale AC and Mod HS scale  -Plan discussed with pt/team/hospice team.  Contact info: [phone] (24/7). pager [phone]

## 2017-09-27 NOTE — PROGRESS NOTE ADULT - SUBJECTIVE AND OBJECTIVE BOX
HPI:  62 y/o w/m h/o liver CA, pancreatitis, Hep-C , HTN ,IDDM. was at inpatient Hospice and was found wandering in another patients room and smearing feces according to EMS. As per [facility] nurse liaison, patient was found to have Temazepam and MS Contin that he appeared to be taking without informing the providers at inpatient hospice.  At the same time the patient was on a Dilaudid PCA and as per hospice, the patient may have become Delirious because of intoxication due to opioids and benzodiazepines.     Overnight, patient fell. Stroke code was code. Patient was agitated and needed Ativan to control his behavior. CT head was done and not acute finding were seen.     PERTINENT PMH REVIEWED:  [x ] YES [ ] NO           SOCIAL HISTORY:   Significant other/partner:               Children:    2               Druze/Spirituality: Religious   Substance hx:  [ ] YES   [x] NO                   Tobacco hx:  [ x] YES  [ ] NO                       Alcohol hx: [x ] YES  [ ] NO> Patient indicates he has been an AA member for several years.          Home Opioid hx:  [x ] YES  [ ] NO   Living Situation: [ x] Home  [ ] Long term care  [ ] Rehab [ ] Other  Patient indicated history of polysubstance abuse (Benzodiazepines and when he was young Cocaine)     FAMILY HISTORY:  No pertinent family history in first degree relatives    [x ] Family history non-contributory     BASELINE (I)ADLs (prior to admission):  Paragon: [ ] total  [x ] moderate [ ] dependent    ADVANCE DIRECTIVES:    DNR [x ] YES [ ] NO                            [x ] Completed  MOLST  [ ] YES [x ] NO                      [ ] Completed  Health Care Proxy [ x] YES  [ ] NO   [ ] Completed.   Living Will  [ ] YES [ ] NO             [x ] Surrogate  [ ] HCP  [ ] Guardian:   Children. However, patient previously indicated his sister, Nevaeh Serrano is his HCP   (However, not paperwork is available)  Phone#:  [phone]  Other contacts: Friend Moreno PONCHO [phone]. The patient has also a brother that has been involved in his care.     Allergies    No Known Allergies    Intolerances    MEDICATIONS  (STANDING):  insulin lispro (HumaLOG) corrective regimen sliding scale   SubCutaneous at bedtime  dextrose 5%. 1000 milliLiter(s) (50 mL/Hr) IV Continuous <Continuous>  dextrose 50% Injectable 12.5 Gram(s) IV Push once  dextrose 50% Injectable 25 Gram(s) IV Push once  dextrose 50% Injectable 25 Gram(s) IV Push once  pantoprazole    Tablet 40 milliGRAM(s) Oral before breakfast  insulin lispro (HumaLOG) corrective regimen sliding scale   SubCutaneous three times a day before meals  dexamethasone     Tablet 2 milliGRAM(s) Oral two times a day  lactulose Syrup 20 Gram(s) Oral two times a day  insulin glargine Injectable (LANTUS) 22 Unit(s) SubCutaneous at bedtime  insulin lispro Injectable (HumaLOG) 18 Unit(s) SubCutaneous three times a day before meals  methadone    Tablet 10 milliGRAM(s) Oral <User Schedule>  HYDROmorphone   Tablet 2 milliGRAM(s) Oral every 6 hours    MEDICATIONS  (PRN):  ondansetron    Tablet 4 milliGRAM(s) Oral two times a day PRN Nausea and/or Vomiting  dextrose Gel 1 Dose(s) Oral once PRN Blood Glucose LESS THAN 70 milliGRAM(s)/deciliter  glucagon  Injectable 1 milliGRAM(s) IntraMuscular once PRN Glucose LESS THAN 70 milligrams/deciliter  naloxone Injectable 0.1 milliGRAM(s) IV Push every 3 minutes PRN Sedation or respiratory depression RR < 10  OLANZapine Disintegrating Tablet 5 milliGRAM(s) Oral every 12 hours PRN Agitation  HYDROmorphone   Tablet 2 milliGRAM(s) Oral every 4 hours PRN Breakthrough pain  LORazepam     Tablet 1 milliGRAM(s) Oral every 8 hours PRN Anxiety    PRESENT SYMPTOMS:  Source: [x ] Patient   [ ] Family   [ ] Team     Pain:                        [x ] No [ ] Yes             [ ] Moderate       Dyspnea:                [x ] No [ ] Yes             [ ] Mild [ ] Moderate [ ] Severe    Anxiety:                  [ ] No [ ] Yes             [ ] Mild [x ] Moderate [ ] Severe    Fatigue:                  [ ] No [ ] Yes             [ ] Mild [x ] Moderate [ ] Severe    Nausea:                  [ ] No [ ] Yes             [ ] Mild [ ] Moderate [ ] Severe    Loss of appetite:   [ ] No [x ] Yes             [x ] Mild [ ] Moderate [ ] Severe    Constipation:        [x ] No [ ] Yes             [ ] Mild [ ] Moderate [ ] Severe    Other Symptoms:  [ ] All other review of systems negative   [x ] Unable to obtain due to poor mentation     Karnofsky Performance Score/Palliative Performance Status Version 2:  30-40 %    PHYSICAL EXAM:  Vital Signs Last 24 Hrs  T(C): 37.3 (27 Sep 2017 06:57), Max: 37.3 (27 Sep 2017 06:57)  T(F): 99.1 (27 Sep 2017 06:57), Max: 99.1 (27 Sep 2017 06:57)  HR: 75 (27 Sep 2017 06:57) (68 - 97)  BP: 127/71 (27 Sep 2017 06:57) (127/65 - 172/84)  BP(mean): --  RR: 18 (27 Sep 2017 06:57) (18 - 18)  SpO2: 97% (27 Sep 2017 06:57) (95% - 99%)    HEENT:  [ ] Normal   [x ] Dry mouth   [ ] ET Tube    [ ] Trach  [ ] Oral lesions    Lungs:   [x ] Clear [ ] Tachypnea  [ ] Audible excessive secretions   [ ] Rhonchi        [ ] Right [ ] Left [ ] Bilateral  [ ] Crackles        [ ] Right [ ] Left [ ] Bilateral  [ ] Wheezing     [ ] Right [ ] Left [ ] Bilateral    Cardiovascular:  S1, S2. No S3. No murmurs     Abdomen: [ ] Soft  [x ] Distended   [x ] +BS x 4  [ ] Non tender [x ] Tender over RUQ, Epigastric region, and maci-umbilical area [ ]PEG   [ ]OGT/ NGT   Last BM:   9/25    Genitourinary: [x ] Normal [ ] Incontinent   [ ] Oliguria/Anuria   [ ] Alatorre    Musculoskeletal:  [ ] Normal   [x ] Weakness  [ ] Bedbound/Wheelchair bound [ ] Edema    Neurological: Patient is sedated. Not able to follow up commands.     Skin: [ ] Normal   [ ] Pressure ulcer(s)     [ ] Rash. [x] other: skin tear over RUE covered with dressing.     LABS:                                                          11.7   6.68  )-----------( 106      ( 27 Sep 2017 07:57 )             34.2   09-27    136  |  99  |  25<H>  ----------------------------<  124<H>  4.1   |  28  |  0.68    Ca    8.4      27 Sep 2017 07:50          Shock: No [ ] Septic [ ] Cardiogenic [ ] Neurologic [ ] Hypovolemic  Vasopressors x None  Inotrophs x None    Protein Calorie Malnutrition: [ ] Mild [x ] Moderate [ ] Severe    Oral Intake: [ ] Unable/mouth care only [x ] Minimal [ ] Moderate [ ] Full Capability  Diet: [ ] NPO [ ] Tube feeds [ ] TPN [x ] Other     RADIOLOGY & ADDITIONAL STUDIES: reviewed.    REFERRALS:   [ ] Chaplaincy  [ ] Hospice  [ ] Child Life  [ ] Social Work  [ ] Case management [ ] Holistic Therapy

## 2017-09-27 NOTE — PROGRESS NOTE ADULT - PROBLEM SELECTOR PLAN 3
Patient is s/p fall yesterday with posterior agitation.   Work up an Rx of possible causes for agitation and Delirium as per primary team.   Continue Zyprexa PRN  Fall precautions

## 2017-09-27 NOTE — CONSULT NOTE ADULT - ASSESSMENT
Patient is a 61 year old man with liver cancer admitted with altered mental status . He had a fall yesterday and required sedation for CT Head. No history of seizures. He has remained lethargic. Neurological exam as above. The lethargy may be due to multiple factors including analgesics, sedatives, and hepatic encephalopathy.    1) May consider  EEG and MRI Head with contrast in further evaluation  2) As per Medicine and Palliative Care.

## 2017-09-27 NOTE — PROGRESS NOTE ADULT - PROBLEM SELECTOR PLAN 5
Improving Renal evaluation noted
Improving.
could be from SIADH.  Renal evaluation called.
As above
Improving.
Pain management as above
Improving Renal evaluation noted
Improving.
As above
As above
Improving.
Improving.
could be from SIADH.  Renal evaluation noted

## 2017-09-27 NOTE — PROVIDER CONTACT NOTE (OTHER) - ASSESSMENT
pt axox4. pt alert and answering questions. ppt consumed 4 apple juices, with a roll, a bowl of cereal and milk. pt current FS with repeat is 141 and 129. pt is ordered to receive 22 units of lantus.

## 2017-09-27 NOTE — PROVIDER CONTACT NOTE (OTHER) - BACKGROUND
pt admitted for ams. pt hx of type II dM, liver cancer, htn. prior to fall, pt axox4. able to ambulate independently.

## 2017-09-27 NOTE — PROVIDER CONTACT NOTE (OTHER) - ASSESSMENT
pt s/p fall. axo0. pt currently sleeping after receiving 2mg ativan IV push. no am labs ordered. pt s/p fall. axo0. pt currently sleeping after receiving 2mg ativan IV push. no am labs ordered. pt currently sleeping after receiving 2mg ativan IV push when patient was agitated while getting CT s/p fall.

## 2017-09-27 NOTE — CHART NOTE - NSCHARTNOTEFT_GEN_A_CORE
Notified by RN that patient is s/p fall with altered mental status. Fall was unwitnessed and it is unclear if pt. had hit his head. RN heard a noise and came in to see pt. sitting bent forward between his bed and the bathroom door. Pt. has a laceration across his right arm. Pt. was very lethargic and answering all questions w/ "yes." Pt. has weakness in both arms. In the middle of the physical exam, code stroke was called. Neurology team came to evaluate the patient and has taken him to CT scan. Family notified, Mrs. Nevaeh Garrett.    -Mirella Wilkerson PA-C. #[id number].

## 2017-09-27 NOTE — PROGRESS NOTE ADULT - SUBJECTIVE AND OBJECTIVE BOX
Diabetes Follow up note: Saw pt this am  Interval Hx: 62 y/o M w/h/o uncontrolled type 2 DM in hospice care secondary to liver ca/pancreatitis/Hep C. Here from hospice with AMS and hyperglycemic. Pt seen at bedside. Pt sleeping, per staff pt felt last night and was found with R UE laceration then pt became combative and is now on 1 to 1 observation. He received IM Ativan earlier today and has been sleeping since.   Additionally, Pt had a hypoglycemic episode at bedtime with BG in 50s treated to BG of 141. Per staff report pt didn't eat much dinner but received full dose of premeal Humalog yesterday.  Pt BG levels at HS on prior 6 days were >200s. BG at time of fall was 161 per entry in flowsheet. Pt remains on Dexa 2mg BID but RN was unable to give med this am due to pt mental status. BG now in 70s.     Review of Systems: Per staff  General:  Sleepy/unarousable   GI: Not eating, no N/V/D.   CV: No CP/SOB noted  ENDO: No S&Sx of hypoglycemia.       MEDS:  insulin lispro (HumaLOG) corrective regimen sliding scale   SubCutaneous at bedtime  insulin lispro (HumaLOG) corrective regimen sliding scale   SubCutaneous three times a day before meals  insulin glargine Injectable (LANTUS) 22 Unit(s) SubCutaneous at bedtime  insulin lispro Injectable (HumaLOG) 18 Unit(s) SubCutaneous three times a day before meals   dexamethasone     Tablet 2 milliGRAM(s) Oral two times a day      Allergies    No Known Allergies      PE:  General: Male lying in bed. NAD. 1 to 1 staff with pt.    Vital Signs Last 24 Hrs  T(C): 36.4 (09-27-17 @ 11:04), Max: 37.3 (09-27-17 @ 06:57)  T(F): 97.6 (09-27-17 @ 11:04), Max: 99.1 (09-27-17 @ 06:57)  HR: 81 (09-27-17 @ 11:04) (68 - 97)  BP: 137/78 (09-27-17 @ 11:04) (127/65 - 171/81)  BP(mean): --  RR: 18 (09-27-17 @ 11:04) (18 - 18)  SpO2: 98% (09-27-17 @ 11:04) (95% - 99%)  Abd: Soft, Tender, ND,   Extremities: Warm. No edema noted.   Neuro: Unarousable     LABS:  CAPILLARY BLOOD GLUCOSE  78 (27 Sep 2017 12:08)  98 (27 Sep 2017 07:37)  161 (27 Sep 2017 03:33)  141 (26 Sep 2017 21:23)  129 (26 Sep 2017 21:22)  58 (26 Sep 2017 21:06)  55 (26 Sep 2017 21:04)  167 (26 Sep 2017 16:57)  225 (26 Sep 2017 12:18)  77 (26 Sep 2017 08:11)  208 (25 Sep 2017 21:03)  253 (25 Sep 2017 16:43)  167 (25 Sep 2017 11:40)  160 (25 Sep 2017 07:36)  225 (24 Sep 2017 20:41)  138 (24 Sep 2017 17:28)  53 (24 Sep 2017 17:11)  57 (24 Sep 2017 17:08)                              11.7   6.68  )-----------( 106      ( 27 Sep 2017 07:57 )             34.2     09-27    136  |  99  |  25<H>  ----------------------------<  124<H>  4.1   |  28  |  0.68    Ca    8.4      27 Sep 2017 07:50      TPro  8.2  /  Alb  3.1<L>  /  TBili  0.5  /  DBili  x   /  AST  53<H>  /  ALT  58<H>  /  AlkPhos  221<H>  09-22        Hemoglobin A1C, Whole Blood: 9.4 % <H> [4.0 - 5.6] (09-14-17 @ 07:44) Diabetes Follow up note: Saw pt this am  Interval Hx: 62 y/o M w/h/o uncontrolled type 2 DM in hospice care secondary to liver ca/pancreatitis/Hep C. Here from hospice with AMS and hyperglycemic. Pt seen at bedside. Pt sleeping, per staff pt felt last night and was found with R UE laceration then pt became combative and is now on 1 to 1 observation. He received IM Ativan earlier today and has been sleeping since.   Additionally, Pt had a hypoglycemic episode at bedtime with BG in 50s treated to BG of 141. Per staff report pt didn't eat much dinner but received full dose of premeal Humalog yesterday.  Pt BG levels at HS on prior 6 days were >200s. BG at time of fall was 161 per entry in flowsheet. Pt remains on Dexa 2mg BID but RN was unable to give med this am due to pt mental status. BG now in 70s.     Review of Systems: Per staff  General:  Sleepy/unarousable   GI: Not eating, no N/V/D.   CV: No CP/SOB noted  ENDO: No S&Sx of hypoglycemia.       MEDS:  insulin lispro (HumaLOG) corrective regimen sliding scale   SubCutaneous at bedtime  insulin lispro (HumaLOG) corrective regimen sliding scale   SubCutaneous three times a day before meals  insulin glargine Injectable (LANTUS) 22 Unit(s) SubCutaneous at bedtime  insulin lispro Injectable (HumaLOG) 18 Unit(s) SubCutaneous three times a day before meals   dexamethasone     Tablet 2 milliGRAM(s) Oral two times a day      Allergies    No Known Allergies      PE:  General: Male lying in bed. NAD. 1 to 1 staff with pt.    Vital Signs Last 24 Hrs  T(C): 36.4 (09-27-17 @ 11:04), Max: 37.3 (09-27-17 @ 06:57)  T(F): 97.6 (09-27-17 @ 11:04), Max: 99.1 (09-27-17 @ 06:57)  HR: 81 (09-27-17 @ 11:04) (68 - 97)  BP: 137/78 (09-27-17 @ 11:04) (127/65 - 171/81)  BP(mean): --  RR: 18 (09-27-17 @ 11:04) (18 - 18)  SpO2: 98% (09-27-17 @ 11:04) (95% - 99%)  Abd: Soft, Tender, ND,   Extremities: Warm. No edema noted. R UE dressing noted D&I  Neuro: Unarousable     LABS:  CAPILLARY BLOOD GLUCOSE  78 (27 Sep 2017 12:08)  98 (27 Sep 2017 07:37)  161 (27 Sep 2017 03:33)  141 (26 Sep 2017 21:23)  129 (26 Sep 2017 21:22)  58 (26 Sep 2017 21:06)  55 (26 Sep 2017 21:04)  167 (26 Sep 2017 16:57)  225 (26 Sep 2017 12:18)  77 (26 Sep 2017 08:11)  208 (25 Sep 2017 21:03)  253 (25 Sep 2017 16:43)  167 (25 Sep 2017 11:40)  160 (25 Sep 2017 07:36)  225 (24 Sep 2017 20:41)  138 (24 Sep 2017 17:28)  53 (24 Sep 2017 17:11)  57 (24 Sep 2017 17:08)                              11.7   6.68  )-----------( 106      ( 27 Sep 2017 07:57 )             34.2     09-27    136  |  99  |  25<H>  ----------------------------<  124<H>  4.1   |  28  |  0.68    Ca    8.4      27 Sep 2017 07:50      TPro  8.2  /  Alb  3.1<L>  /  TBili  0.5  /  DBili  x   /  AST  53<H>  /  ALT  58<H>  /  AlkPhos  221<H>  09-22        Hemoglobin A1C, Whole Blood: 9.4 % <H> [4.0 - 5.6] (09-14-17 @ 07:44)

## 2017-09-28 VITALS
TEMPERATURE: 98 F | DIASTOLIC BLOOD PRESSURE: 86 MMHG | SYSTOLIC BLOOD PRESSURE: 175 MMHG | RESPIRATION RATE: 18 BRPM | HEART RATE: 78 BPM | OXYGEN SATURATION: 98 %

## 2017-09-28 PROCEDURE — 99232 SBSQ HOSP IP/OBS MODERATE 35: CPT

## 2017-09-28 PROCEDURE — 99233 SBSQ HOSP IP/OBS HIGH 50: CPT

## 2017-09-28 RX ORDER — PANTOPRAZOLE SODIUM 20 MG/1
1 TABLET, DELAYED RELEASE ORAL
Qty: 30 | Refills: 0 | OUTPATIENT
Start: 2017-09-28 | End: 2017-10-28

## 2017-09-28 RX ORDER — INSULIN LISPRO 100/ML
8 VIAL (ML) SUBCUTANEOUS
Qty: 0 | Refills: 0 | Status: DISCONTINUED | OUTPATIENT
Start: 2017-09-28 | End: 2017-09-28

## 2017-09-28 RX ORDER — METHADONE HYDROCHLORIDE 40 MG/1
1 TABLET ORAL
Qty: 0 | Refills: 0 | COMMUNITY
Start: 2017-09-28

## 2017-09-28 RX ORDER — ONDANSETRON 8 MG/1
0 TABLET, FILM COATED ORAL
Qty: 0 | Refills: 0 | COMMUNITY

## 2017-09-28 RX ORDER — NALOXONE HYDROCHLORIDE 4 MG/.1ML
4 SPRAY NASAL
Qty: 3 | Refills: 0 | OUTPATIENT
Start: 2017-09-28

## 2017-09-28 RX ORDER — LACTULOSE 10 G/15ML
30 SOLUTION ORAL
Qty: 0 | Refills: 0 | COMMUNITY
Start: 2017-09-28

## 2017-09-28 RX ORDER — ONDANSETRON 8 MG/1
1 TABLET, FILM COATED ORAL
Qty: 0 | Refills: 0 | COMMUNITY
Start: 2017-09-28

## 2017-09-28 RX ORDER — DEXAMETHASONE 0.5 MG/5ML
1 ELIXIR ORAL
Qty: 0 | Refills: 0 | COMMUNITY
Start: 2017-09-28

## 2017-09-28 RX ORDER — DEXAMETHASONE 0.5 MG/5ML
0 ELIXIR ORAL
Qty: 0 | Refills: 0 | COMMUNITY

## 2017-09-28 RX ORDER — INSULIN LISPRO 100/ML
VIAL (ML) SUBCUTANEOUS AT BEDTIME
Qty: 0 | Refills: 0 | Status: DISCONTINUED | OUTPATIENT
Start: 2017-09-28 | End: 2017-09-28

## 2017-09-28 RX ORDER — INSULIN ASPART 100 [IU]/ML
20 INJECTION, SUSPENSION SUBCUTANEOUS
Qty: 5 | Refills: 0 | OUTPATIENT
Start: 2017-09-28

## 2017-09-28 RX ORDER — HYDROMORPHONE HYDROCHLORIDE 2 MG/ML
1 INJECTION INTRAMUSCULAR; INTRAVENOUS; SUBCUTANEOUS
Qty: 28 | Refills: 0 | OUTPATIENT
Start: 2017-09-28 | End: 2017-10-05

## 2017-09-28 RX ORDER — OLANZAPINE 15 MG/1
1 TABLET, FILM COATED ORAL
Qty: 60 | Refills: 0 | OUTPATIENT
Start: 2017-09-28 | End: 2017-10-28

## 2017-09-28 RX ORDER — TEMAZEPAM 15 MG/1
0 CAPSULE ORAL
Qty: 0 | Refills: 0 | COMMUNITY

## 2017-09-28 RX ORDER — PANTOPRAZOLE SODIUM 20 MG/1
1 TABLET, DELAYED RELEASE ORAL
Qty: 0 | Refills: 0 | COMMUNITY
Start: 2017-09-28

## 2017-09-28 RX ORDER — LACTULOSE 10 G/15ML
30 SOLUTION ORAL
Qty: 30 | Refills: 0 | OUTPATIENT
Start: 2017-09-28 | End: 2017-10-28

## 2017-09-28 RX ORDER — DEXAMETHASONE 0.5 MG/5ML
1 ELIXIR ORAL
Qty: 60 | Refills: 0 | OUTPATIENT
Start: 2017-09-28 | End: 2017-10-28

## 2017-09-28 RX ORDER — HYDROMORPHONE HYDROCHLORIDE 2 MG/ML
1 INJECTION INTRAMUSCULAR; INTRAVENOUS; SUBCUTANEOUS
Qty: 40 | Refills: 0 | OUTPATIENT
Start: 2017-09-28 | End: 2017-10-08

## 2017-09-28 RX ORDER — HYDROMORPHONE HYDROCHLORIDE 2 MG/ML
1 INJECTION INTRAMUSCULAR; INTRAVENOUS; SUBCUTANEOUS
Qty: 0 | Refills: 0 | COMMUNITY
Start: 2017-09-28

## 2017-09-28 RX ORDER — INSULIN NPH HUM/REG INSULIN HM 70-30/ML
20 VIAL (ML) SUBCUTANEOUS
Qty: 5 | Refills: 0 | OUTPATIENT
Start: 2017-09-28 | End: 2017-10-28

## 2017-09-28 RX ORDER — INSULIN GLARGINE 100 [IU]/ML
18 INJECTION, SOLUTION SUBCUTANEOUS EVERY MORNING
Qty: 0 | Refills: 0 | Status: DISCONTINUED | OUTPATIENT
Start: 2017-09-28 | End: 2017-09-28

## 2017-09-28 RX ORDER — INSULIN LISPRO 100/ML
VIAL (ML) SUBCUTANEOUS
Qty: 0 | Refills: 0 | Status: DISCONTINUED | OUTPATIENT
Start: 2017-09-28 | End: 2017-09-28

## 2017-09-28 RX ORDER — HYDROMORPHONE HYDROCHLORIDE 2 MG/ML
1 INJECTION INTRAMUSCULAR; INTRAVENOUS; SUBCUTANEOUS
Qty: 42 | Refills: 0 | OUTPATIENT
Start: 2017-09-28 | End: 2017-10-05

## 2017-09-28 RX ORDER — DEXAMETHASONE 0.5 MG/5ML
1 ELIXIR ORAL
Qty: 30 | Refills: 0 | OUTPATIENT
Start: 2017-09-28 | End: 2017-10-28

## 2017-09-28 RX ORDER — HYDROMORPHONE HYDROCHLORIDE 2 MG/ML
1 INJECTION INTRAMUSCULAR; INTRAVENOUS; SUBCUTANEOUS
Qty: 60 | Refills: 0 | OUTPATIENT
Start: 2017-09-28 | End: 2017-10-08

## 2017-09-28 RX ORDER — INSULIN ASPART 100 [IU]/ML
12 INJECTION, SUSPENSION SUBCUTANEOUS
Qty: 5 | Refills: 0 | OUTPATIENT
Start: 2017-09-28

## 2017-09-28 RX ORDER — ACETAMINOPHEN 500 MG
650 TABLET ORAL ONCE
Qty: 0 | Refills: 0 | Status: DISCONTINUED | OUTPATIENT
Start: 2017-09-28 | End: 2017-09-28

## 2017-09-28 RX ORDER — METHADONE HYDROCHLORIDE 40 MG/1
1 TABLET ORAL
Qty: 30 | Refills: 0 | OUTPATIENT
Start: 2017-09-28 | End: 2017-10-08

## 2017-09-28 RX ORDER — INSULIN ASPART 100 [IU]/ML
15 INJECTION, SUSPENSION SUBCUTANEOUS
Qty: 5 | Refills: 0 | OUTPATIENT
Start: 2017-09-28 | End: 2017-10-28

## 2017-09-28 RX ORDER — MORPHINE SULFATE 50 MG/1
0 CAPSULE, EXTENDED RELEASE ORAL
Qty: 0 | Refills: 0 | COMMUNITY

## 2017-09-28 RX ORDER — INSULIN NPH HUM/REG INSULIN HM 70-30/ML
15 VIAL (ML) SUBCUTANEOUS
Qty: 5 | Refills: 0 | OUTPATIENT
Start: 2017-09-28

## 2017-09-28 RX ORDER — OXYCODONE HYDROCHLORIDE 5 MG/1
1 TABLET ORAL
Qty: 0 | Refills: 0 | COMMUNITY

## 2017-09-28 RX ADMIN — Medication 10: at 12:53

## 2017-09-28 RX ADMIN — INSULIN GLARGINE 18 UNIT(S): 100 INJECTION, SOLUTION SUBCUTANEOUS at 11:00

## 2017-09-28 RX ADMIN — Medication 2 MILLIGRAM(S): at 06:43

## 2017-09-28 RX ADMIN — LACTULOSE 20 GRAM(S): 10 SOLUTION ORAL at 06:43

## 2017-09-28 RX ADMIN — HYDROMORPHONE HYDROCHLORIDE 2 MILLIGRAM(S): 2 INJECTION INTRAMUSCULAR; INTRAVENOUS; SUBCUTANEOUS at 13:50

## 2017-09-28 RX ADMIN — HYDROMORPHONE HYDROCHLORIDE 2 MILLIGRAM(S): 2 INJECTION INTRAMUSCULAR; INTRAVENOUS; SUBCUTANEOUS at 07:23

## 2017-09-28 RX ADMIN — HYDROMORPHONE HYDROCHLORIDE 2 MILLIGRAM(S): 2 INJECTION INTRAMUSCULAR; INTRAVENOUS; SUBCUTANEOUS at 06:43

## 2017-09-28 RX ADMIN — HYDROMORPHONE HYDROCHLORIDE 2 MILLIGRAM(S): 2 INJECTION INTRAMUSCULAR; INTRAVENOUS; SUBCUTANEOUS at 12:52

## 2017-09-28 RX ADMIN — Medication 1 MILLIGRAM(S): at 01:50

## 2017-09-28 RX ADMIN — Medication 2: at 08:19

## 2017-09-28 RX ADMIN — PANTOPRAZOLE SODIUM 40 MILLIGRAM(S): 20 TABLET, DELAYED RELEASE ORAL at 06:43

## 2017-09-28 RX ADMIN — METHADONE HYDROCHLORIDE 10 MILLIGRAM(S): 40 TABLET ORAL at 06:43

## 2017-09-28 RX ADMIN — Medication 8 UNIT(S): at 12:53

## 2017-09-28 NOTE — PROGRESS NOTE ADULT - ASSESSMENT
60 y/o M w/h/o uncontrolled type 2 DM in hospice care secondary to liver ca/pancreatitis/Hep C. Here from hospice with AMS and hyperglycemiA. Pt on steroids. Pt now s/p fall/AMS but improved mental status. Team planning discharge home today. Pt will require insulin while on dexa based on glucose levels while hospitalized. Would conservatively dose insulin to prevent hypoglycemia in addition to poor long-term prognosis. BG goal is to avoid hypoglycemic and severe hyperglycemia. Family taking responsibility for care and will need to demonstrate teach back with insulin administration. 70/30 can be used to decrease care burden vs basal/bolus but education must be provided to family that pt will need to eat lunch to avoid peak of NPH insulin.

## 2017-09-28 NOTE — PROGRESS NOTE ADULT - PROBLEM SELECTOR PLAN 3
Monitor for changes  Episode requiring 1:1 recently  No overt confusion  Avoid excessive or extraneous medications

## 2017-09-28 NOTE — PROGRESS NOTE ADULT - SUBJECTIVE AND OBJECTIVE BOX
No major issues overnight  Tolerable abdominal discomfort  SLight constipation, but BM today    He apologized to me that he was "sorry" for all of the things he had done; he did not want to expand beyond that    Remaining ESAS negative          T(C): 36.9 (09-28-17 @ 07:40), Max: 36.9 (09-27-17 @ 20:57)  HR: 78 (09-28-17 @ 07:40) (70 - 78)  BP: 175/86 (09-28-17 @ 07:40) (138/72 - 187/81)  RR: 18 (09-28-17 @ 07:40) (18 - 98)  SpO2: 98% (09-28-17 @ 07:40) (98% - 100%)  Wt(kg): --      27 Sep 2017 07:01  -  28 Sep 2017 07:00  --------------------------------------------------------  IN:    dextrose 5% + sodium chloride 0.45%.: 950 mL    Oral Fluid: 100 mL  Total IN: 1050 mL    OUT:  Total OUT: 0 mL    Total NET: 1050 mL      28 Sep 2017 07:01  -  28 Sep 2017 13:36  --------------------------------------------------------  IN:    Oral Fluid: 360 mL  Total IN: 360 mL    OUT:  Total OUT: 0 mL    Total NET: 360 mL          09-27 @ 07:01 - 09-28 @ 07:00  --------------------------------------------------------  IN: 1050 mL / OUT: 0 mL / NET: 1050 mL    09-28 @ 07:01 - 09-28 @ 13:36  --------------------------------------------------------  IN: 360 mL / OUT: 0 mL / NET: 360 mL      CAPILLARY BLOOD GLUCOSE  362 (28 Sep 2017 12:16)  222 (28 Sep 2017 07:40)  122 (27 Sep 2017 22:33)  85 (27 Sep 2017 21:26)  93 (27 Sep 2017 17:27)  76 (27 Sep 2017 14:47)            ondansetron    Tablet 4 milliGRAM(s) Oral two times a day PRN  dextrose 5%. 1000 milliLiter(s) IV Continuous <Continuous>  dextrose Gel 1 Dose(s) Oral once PRN  dextrose 50% Injectable 12.5 Gram(s) IV Push once  dextrose 50% Injectable 25 Gram(s) IV Push once  dextrose 50% Injectable 25 Gram(s) IV Push once  glucagon  Injectable 1 milliGRAM(s) IntraMuscular once PRN  naloxone Injectable 0.1 milliGRAM(s) IV Push every 3 minutes PRN  pantoprazole    Tablet 40 milliGRAM(s) Oral before breakfast  dexamethasone     Tablet 2 milliGRAM(s) Oral two times a day  OLANZapine Disintegrating Tablet 5 milliGRAM(s) Oral every 12 hours PRN  lactulose Syrup 20 Gram(s) Oral two times a day  methadone    Tablet 10 milliGRAM(s) Oral <User Schedule>  HYDROmorphone   Tablet 2 milliGRAM(s) Oral every 6 hours  HYDROmorphone   Tablet 2 milliGRAM(s) Oral every 4 hours PRN  LORazepam     Tablet 1 milliGRAM(s) Oral every 8 hours PRN  acetaminophen   Tablet. 650 milliGRAM(s) Oral once  insulin lispro (HumaLOG) corrective regimen sliding scale   SubCutaneous at bedtime  insulin lispro (HumaLOG) corrective regimen sliding scale   SubCutaneous three times a day before meals  insulin glargine Injectable (LANTUS) 18 Unit(s) SubCutaneous every morning  insulin lispro Injectable (HumaLOG) 8 Unit(s) SubCutaneous three times a day with meals          09-27    136  |  99  |  25<H>  ----------------------------<  124<H>  4.1   |  28  |  0.68    Ca    8.4      27 Sep 2017 07:50        Procalc  BNP  ABG                          11.7   6.68  )-----------( 106      ( 27 Sep 2017 07:57 )             34.2           blood and urine cultures          PERTINENT PMH REVIEWED:  [x ] YES [ ] NO           SOCIAL HISTORY:   Significant other/partner:               Children:    2               Adventist/Spirituality: Buddhism   Substance hx:  [ ] YES   [x] NO                   Tobacco hx:  [ x] YES  [ ] NO                       Alcohol hx: [x ] YES  [ ] NO> Patient indicates he has been an AA member for several years.          Home Opioid hx:  [x ] YES  [ ] NO   Living Situation: [ x] Home  [ ] Long term care  [ ] Rehab [ ] Other  Patient indicated history of polysubstance abuse (Benzodiazepines and when he was young Cocaine)     FAMILY HISTORY:  No pertinent family history in first degree relatives    [x ] Family history non-contributory     BASELINE (I)ADLs (prior to admission):  Fergus: [ ] total  [x ] moderate [ ] dependent    ADVANCE DIRECTIVES:    DNR [x ] YES [ ] NO                            [x ] Completed  MOLST  [ ] YES [x ] NO                      [ ] Completed  Health Care Proxy [ x] YES  [ ] NO   [ ] Completed.   Living Will  [ ] YES [ ] NO             [x ] Surrogate  [ ] HCP  [ ] Guardian:   Children. However, patient previously indicated his sister, Nevaeh Serrano is his HCP   (However, not paperwork is available)  Phone#:  [phone]  Other contacts: Friend Moreno ISAAC [phone]. The patient has also a brother that has been involved in his care.     Allergies    No Known Allergies    Intolerances    MEDICATIONS  (STANDING):  insulin lispro (HumaLOG) corrective regimen sliding scale   SubCutaneous at bedtime  dextrose 5%. 1000 milliLiter(s) (50 mL/Hr) IV Continuous <Continuous>  dextrose 50% Injectable 12.5 Gram(s) IV Push once  dextrose 50% Injectable 25 Gram(s) IV Push once  dextrose 50% Injectable 25 Gram(s) IV Push once  pantoprazole    Tablet 40 milliGRAM(s) Oral before breakfast  insulin lispro (HumaLOG) corrective regimen sliding scale   SubCutaneous three times a day before meals  dexamethasone     Tablet 2 milliGRAM(s) Oral two times a day  lactulose Syrup 20 Gram(s) Oral two times a day  insulin glargine Injectable (LANTUS) 22 Unit(s) SubCutaneous at bedtime  insulin lispro Injectable (HumaLOG) 18 Unit(s) SubCutaneous three times a day before meals  methadone    Tablet 10 milliGRAM(s) Oral <User Schedule>  HYDROmorphone   Tablet 2 milliGRAM(s) Oral every 6 hours    MEDICATIONS  (PRN):  ondansetron    Tablet 4 milliGRAM(s) Oral two times a day PRN Nausea and/or Vomiting  dextrose Gel 1 Dose(s) Oral once PRN Blood Glucose LESS THAN 70 milliGRAM(s)/deciliter  glucagon  Injectable 1 milliGRAM(s) IntraMuscular once PRN Glucose LESS THAN 70 milligrams/deciliter  naloxone Injectable 0.1 milliGRAM(s) IV Push every 3 minutes PRN Sedation or respiratory depression RR < 10  OLANZapine Disintegrating Tablet 5 milliGRAM(s) Oral every 12 hours PRN Agitation  HYDROmorphone   Tablet 2 milliGRAM(s) Oral every 4 hours PRN Breakthrough pain  LORazepam     Tablet 1 milliGRAM(s) Oral every 8 hours PRN Anxiety    PRESENT SYMPTOMS:  Source: [x ] Patient   [ ] Family   [ ] Team     Pain:                        [x ] No [ ] Yes             [ ] Moderate       Dyspnea:                [x ] No [ ] Yes             [ ] Mild [ ] Moderate [ ] Severe    Anxiety:                  [ ] No [ ] Yes             [ ] Mild [x ] Moderate [ ] Severe    Fatigue:                  [ ] No [ ] Yes             [ ] Mild [x ] Moderate [ ] Severe    Nausea:                  [ ] No [ ] Yes             [ ] Mild [ ] Moderate [ ] Severe    Loss of appetite:   [ ] No [x ] Yes             [x ] Mild [ ] Moderate [ ] Severe    Constipation:        [x ] No [ ] Yes             [ ] Mild [ ] Moderate [ ] Severe    Other Symptoms:  [ ] All other review of systems negative   [x ] Unable to obtain due to poor mentation     Karnofsky Performance Score/Palliative Performance Status Version 2:  30-40 %    PHYSICAL EXAM:       HEENT:  [ ] Normal   [x ] Dry mouth   [ ] ET Tube    [ ] Trach  [ ] Oral lesions    Lungs:   [x ] Clear [ ] Tachypnea  [ ] Audible excessive secretions   [ ] Rhonchi        [ ] Right [ ] Left [ ] Bilateral  [ ] Crackles        [ ] Right [ ] Left [ ] Bilateral  [ ] Wheezing     [ ] Right [ ] Left [ ] Bilateral    Cardiovascular:  S1, S2. No S3. No murmurs     Abdomen: [ ] Soft  [x ] Distended   [x ] +BS x 4  [ ] Non tender [x ] Tender over RUQ, Epigastric region, and maci-umbilical area [ ]PEG   [ ]OGT/ NGT   Last BM:   9/25    Genitourinary: [x ] Normal [ ] Incontinent   [ ] Oliguria/Anuria   [ ] Alatorre    Musculoskeletal:  [ ] Normal   [x ] Weakness  [ ] Bedbound/Wheelchair bound [ ] Edema    Neurological: Patient is sedated. Not able to follow up commands.     Skin: [ ] Normal   [ ] Pressure ulcer(s)     [ ] Rash. [x] other: skin tear over RUE covered with dressing.     LABS:                           Shock: No [ ] Septic [ ] Cardiogenic [ ] Neurologic [ ] Hypovolemic  Vasopressors x None  Inotrophs x None    Protein Calorie Malnutrition: [ ] Mild [x ] Moderate [ ] Severe    Oral Intake: [ ] Unable/mouth care only [x ] Minimal [ ] Moderate [ ] Full Capability  Diet: [ ] NPO [ ] Tube feeds [ ] TPN [x ] Other     RADIOLOGY & ADDITIONAL STUDIES: reviewed.    REFERRALS:   [ ] Chaplaincy  [ ] Hospice  [ ] Child Life  [ ] Social Work  [ ] Case management [ ] Holistic Therapy

## 2017-09-28 NOTE — PROVIDER CONTACT NOTE (OTHER) - DATE AND TIME:
27-Sep-2017 04:46
17-Sep-2017 12:45
19-Sep-2017 05:12
24-Sep-2017 17:15
24-Sep-2017 17:30
26-Sep-2017 21:09
26-Sep-2017 21:22
26-Sep-2017 21:42
27-Sep-2017 21:26
27-Sep-2017 22:26
28-Sep-2017 01:38

## 2017-09-28 NOTE — PROGRESS NOTE ADULT - SUBJECTIVE AND OBJECTIVE BOX
Diabetes Follow up note:  Interval Hx:  62 y/o M w/h/o uncontrolled type 2 DM in hospice care secondary to liver ca/pancreatitis/Hep C. Here from hospice with AMS and hyperglycemic. Pt had AMS/agitation yesterday and was lethargic after receiving ativan-not eating was started on D5 infusion. Pt seen at bedside. More alert today-at most of breakfast and steroids restarted this morning. Team planning for discharge home today-family is taking responsibility for care.     Review of Systems:  General: "Can I go home today?"  GI: Tolerating POs without any N/V/D/ABD PAIN.  CV: No CP/SOB  ENDO: No S&Sx of hypoglycemia  MEDS:  dexamethasone     Tablet 2 milliGRAM(s) Oral two times a day  insulin lispro (HumaLOG) corrective regimen sliding scale   SubCutaneous at bedtime  insulin lispro (HumaLOG) corrective regimen sliding scale   SubCutaneous three times a day before meals  insulin glargine Injectable (LANTUS) 15 Unit(s) SubCutaneous at bedtime  insulin lispro Injectable (HumaLOG) 5 Unit(s) SubCutaneous three times a day with meals      Allergies    No Known Allergies      PE:  General: Male lying in bed. NAD.   Vital Signs Last 24 Hrs  T(C): 36.9 (28 Sep 2017 07:40), Max: 36.9 (27 Sep 2017 20:57)  T(F): 98.4 (28 Sep 2017 07:40), Max: 98.5 (27 Sep 2017 20:57)  HR: 78 (28 Sep 2017 07:40) (70 - 81)  BP: 175/86 (28 Sep 2017 07:40) (137/78 - 187/81)  BP(mean): --  RR: 18 (28 Sep 2017 07:40) (18 - 98)  SpO2: 98% (28 Sep 2017 07:40) (98% - 100%)  Abd: Soft, NT,ND,   Extremities: Warm. No edema noted.   Neuro: A&O X2    LABS:  CAPILLARY BLOOD GLUCOSE  222 (09-28 @ 07:40)  122 (09-27 @ 22:33)  85 (09-27 @ 21:26)  93 (09-27 @ 17:27)  76 (09-27 @ 14:47)  78 (09-27 @ 12:08)  98 (09-27 @ 07:37)  161 (09-27 @ 03:33)  141 (09-26 @ 21:23)  129 (09-26 @ 21:22)  58 (09-26 @ 21:06)  55 (09-26 @ 21:04)  167 (09-26 @ 16:57)  225 (09-26 @ 12:18)  77 (09-26 @ 08:11)  208 (09-25 @ 21:03)  253 (09-25 @ 16:43)  167 (09-25 @ 11:40)                            11.7   6.68  )-----------( 106      ( 27 Sep 2017 07:57 )             34.2       09-27    136  |  99  |  25<H>  ----------------------------<  124<H>  4.1   |  28  |  0.68    Ca    8.4      27 Sep 2017 07:50            Hemoglobin A1C, Whole Blood: 9.4 % <H> [4.0 - 5.6] (09-14-17 @ 07:44)            Contact number: chelsea [phone] or [phone]

## 2017-09-28 NOTE — CHART NOTE - NSCHARTNOTEFT_GEN_A_CORE
Notified by RN that pt. is agitated and trying to climb out of the bed. Pt. was recently lethargic s/p unwitnessed fall and had a code stroke on 9/27. Ativan 2mg IV push was needed in the ED in order to sit through the CT scan. Pt. was lethargic throughout the day likely secondary to ativan use. Pt. was given PO ativan earlier in the night to little effect. Suggested to give 1 mg Ativan IV x1 and place pt. back on constant observation, which was discontinued earlier in the day secondary to agitation. Received notification from nurse manager Janet that pt. can't be placed on constant observation until medication use has been attempted and escalated. Will give 1 mg ativan IV x1 for now and continue enhanced observation. Continue to monitor pt. throughout the night. F/u w/ primary team in AM.    -Mirella Wilkerson PA-C. #[id number].

## 2017-09-28 NOTE — PROGRESS NOTE ADULT - PROBLEM SELECTOR PLAN 1
Inpatient:  discontinue d5 infusion  -test BG AC/HS  -Give Lantus 15 units QAM (stat dose now)  -Increase Humalog 8 units AC meals (hold if not eating)  -Adjust Humalog moderate correction scale AC and Mod HS scale  Discharge Plan:  Pt to go home on dexa 2mg BID per team  Recommend: Humulin 70/30 KwikPen (needs Rx for insulin and pen needles.)  Family needs to learn insulin Pen and demonstrate return technique  Homecare to be set up per team  discussed w/pt and medicine team during rounds. Inpatient:  discontinue d5 infusion  -test BG AC/HS  -Give Lantus 18 units QAM (stat dose now)  -Increase Humalog 8 units AC meals (hold if not eating)  -Adjust Humalog moderate correction scale AC and Mod HS scale  Discharge Plan:  Pt to go home on dexa 2mg BID per team  Recommend: Humulin 70/30 KwikPen (needs Rx for insulin and pen needles.)  Doses: 20 units w/breakfast and decadron            15 units w/dinner and decadron dose  ***pt must eat lunch***  Family needs to learn insulin Pen and demonstrate return technique  Ensure family has glucometer at home  Homecare to be set up per team  discussed w/pt and medicine team during rounds. Inpatient:  discontinue d5 infusion  -test BG AC/HS  -Give Lantus 18 units QAM (stat dose now)  -Increase Humalog 8 units AC meals (hold if not eating)  -Adjust Humalog moderate correction scale AC and Mod HS scale  Discharge Plan:  Pt to go home on dexa 2mg BID per team  Recommend: Humulin 70/30 KwikPen (needs Rx for insulin and pen needles.)  Doses: 20 units w/breakfast and decadron            15 units w/dinner and decadron dose  ***pt must eat lunch***  Family needs to learn insulin Pen and demonstrate return technique  Ensure family has glucometer at home  Homecare to be set up per team  discussed w/pt and medicine team during rounds.  pager: [phone]

## 2017-09-28 NOTE — PROGRESS NOTE ADULT - PROBLEM SELECTOR PLAN 1
The patient that his pain is controlled  Rx as written by Dr. Wilkerson  No appearance of adverse effects

## 2017-09-28 NOTE — PROGRESS NOTE ADULT - PROBLEM SELECTOR PROBLEM 3
Controlled type 2 diabetes mellitus with hyperglycemia, without long-term current use of insulin
Delirium
Controlled type 2 diabetes mellitus with hyperglycemia, without long-term current use of insulin
Controlled type 2 diabetes mellitus with hyperglycemia, without long-term current use of insulin
Delirium
Controlled type 2 diabetes mellitus with hyperglycemia, without long-term current use of insulin
Delirium
Delirium
Malignant neoplasm of liver, unspecified liver malignancy type
Malignant neoplasm of liver, unspecified liver malignancy type
Delirium
Controlled type 2 diabetes mellitus with hyperglycemia, without long-term current use of insulin
Delirium
Controlled type 2 diabetes mellitus with hyperglycemia, without long-term current use of insulin
Malignant neoplasm of liver, unspecified liver malignancy type

## 2017-09-28 NOTE — PROGRESS NOTE ADULT - PROBLEM SELECTOR PROBLEM 4
Malignant neoplasm of liver, unspecified liver malignancy type
Palliative care encounter
Malignant neoplasm of liver, unspecified liver malignancy type
Malignant neoplasm of liver, unspecified liver malignancy type
Palliative care encounter
Benign essential hypertension
Benign essential hypertension
Malignant neoplasm of liver, unspecified liver malignancy type
Palliative care encounter
Malignant neoplasm of liver, unspecified liver malignancy type
Palliative care encounter
Malignant neoplasm of liver, unspecified liver malignancy type
Malignant neoplasm of liver, unspecified liver malignancy type
Benign essential hypertension
Malignant neoplasm of liver, unspecified liver malignancy type

## 2017-09-28 NOTE — PROVIDER CONTACT NOTE (OTHER) - ASSESSMENT
pt given 1mg Ativan Po at 2230. pt is trying to climb oob unassisted. pt is non compliant. RN has to stay in room. pt is unsteady as he walks.

## 2017-09-28 NOTE — PROVIDER CONTACT NOTE (OTHER) - REASON
ECG completed this morning results ready to be reviewed
Pt  after hypoglycemic protocol
Pt FS 57, Repeat 53
complaints of heartburn.
pt agitated trying to climb oob to use bathroom
pt agitated trying to climb oob to use bathroom
pt hypoglycemic, FS 55 and 58.
pt not eating current FS 86
pt was hypoglycemic, current FS with repeat was 141 and 129
pt woke up and stated I want to eat.
pt s/p fall, no am labs

## 2017-09-28 NOTE — PROGRESS NOTE ADULT - PROBLEM SELECTOR PROBLEM 1
Pain due to neoplasm
Uncontrolled type 2 diabetes mellitus with hyperglycemia, with long-term current use of insulin
Delirium due to another medical condition
Delirium due to another medical condition
Pain due to neoplasm
Uncontrolled type 2 diabetes mellitus with hyperglycemia, with long-term current use of insulin
Pain due to neoplasm
Uncontrolled type 2 diabetes mellitus with hyperglycemia, with long-term current use of insulin
Pain due to neoplasm
Delirium due to another medical condition
Pain due to neoplasm

## 2017-09-28 NOTE — PROGRESS NOTE ADULT - ASSESSMENT
60 y/o w/m h/o liver CA, pancreatitis, Hep-C , HTN ,IDDM admitted with Delirium in the setting of prescription medications abuse. Consult called for symptomatic Rx (pain/Anxiety) and resources (hospice referral). Current he is sedated

## 2017-09-28 NOTE — PROGRESS NOTE ADULT - PROBLEM SELECTOR PROBLEM 2
Benign essential hypertension
Anxiety
Anxiety
Benign essential hypertension
Benign essential hypertension
Anxiety
Anxiety
Benign essential hypertension
Controlled type 2 diabetes mellitus with hyperglycemia, without long-term current use of insulin
Controlled type 2 diabetes mellitus with hyperglycemia, without long-term current use of insulin
Anxiety
Benign essential hypertension
Controlled type 2 diabetes mellitus with hyperglycemia, without long-term current use of insulin

## 2017-09-28 NOTE — CHART NOTE - NSCHARTNOTEFT_GEN_A_CORE
Pt's brother and sister taking full responsibility of patients care at home (including medication administration (insulin, pain meds)). Discussed medication reconciliation with Dr. Wilkerson. Pt will be discharged on dilaudid po, methadone and ativan for pain and anxiety, as well as naloxone inhalation as needed. Pt instructed to follow up at [facility] for Psychiatry, Pain management and his PMD. Medication reconciliation reviewed with attending and pt medically cleared for discharge home by Dr. Martini. MOLST form was filled out with Dr. Enrique. Brother verbalized understanding of insulin administration.

## 2017-10-03 NOTE — ED ADULT NURSE NOTE - CCCP TRG CHIEF CMPLNT
Patient wife called, verified HIPAA. Relayed message for patient to go to [facility] ED as soon as possible, and relayed Dr. Varela Escort message below. Patient and wife verbalized understanding and compliance.   Had no questions at time of call and said they woul abdominal pain

## 2017-10-18 ENCOUNTER — INPATIENT (INPATIENT)
Facility: HOSPITAL | Age: 62
LOS: 4 days | Discharge: HOME HEALTH | DRG: 432 | End: 2017-10-23
Attending: HOSPITALIST | Admitting: HOSPITALIST
Payer: MEDICARE

## 2017-10-18 ENCOUNTER — OUTPATIENT (OUTPATIENT)
Dept: OUTPATIENT SERVICES | Facility: HOSPITAL | Age: 62
LOS: 1 days | End: 2017-10-18

## 2017-10-18 VITALS
WEIGHT: 145.06 LBS | RESPIRATION RATE: 14 BRPM | HEIGHT: 72 IN | HEART RATE: 116 BPM | DIASTOLIC BLOOD PRESSURE: 87 MMHG | TEMPERATURE: 98 F | OXYGEN SATURATION: 98 % | SYSTOLIC BLOOD PRESSURE: 144 MMHG

## 2017-10-18 DIAGNOSIS — S91.309A UNSPECIFIED OPEN WOUND, UNSPECIFIED FOOT, INITIAL ENCOUNTER: ICD-10-CM

## 2017-10-18 DIAGNOSIS — Z29.9 ENCOUNTER FOR PROPHYLACTIC MEASURES, UNSPECIFIED: ICD-10-CM

## 2017-10-18 DIAGNOSIS — C22.9 MALIGNANT NEOPLASM OF LIVER, NOT SPECIFIED AS PRIMARY OR SECONDARY: ICD-10-CM

## 2017-10-18 DIAGNOSIS — L03.119 CELLULITIS OF UNSPECIFIED PART OF LIMB: ICD-10-CM

## 2017-10-18 DIAGNOSIS — E11.9 TYPE 2 DIABETES MELLITUS WITHOUT COMPLICATIONS: ICD-10-CM

## 2017-10-18 LAB
ALBUMIN SERPL ELPH-MCNC: 2.1 G/DL — LOW (ref 3.3–5)
ALP SERPL-CCNC: 354 U/L — HIGH (ref 40–120)
ALT FLD-CCNC: 56 U/L — SIGNIFICANT CHANGE UP (ref 12–78)
ANION GAP SERPL CALC-SCNC: 7 MMOL/L — SIGNIFICANT CHANGE UP (ref 5–17)
APTT BLD: 32.4 SEC — SIGNIFICANT CHANGE UP (ref 27.5–37.4)
AST SERPL-CCNC: 59 U/L — HIGH (ref 15–37)
BASOPHILS # BLD AUTO: 0.2 K/UL — SIGNIFICANT CHANGE UP (ref 0–0.2)
BASOPHILS NFR BLD AUTO: 2.7 % — HIGH (ref 0–2)
BILIRUB SERPL-MCNC: 0.8 MG/DL — SIGNIFICANT CHANGE UP (ref 0.2–1.2)
BUN SERPL-MCNC: 26 MG/DL — HIGH (ref 7–23)
CALCIUM SERPL-MCNC: 8.4 MG/DL — LOW (ref 8.5–10.1)
CHLORIDE SERPL-SCNC: 98 MMOL/L — SIGNIFICANT CHANGE UP (ref 96–108)
CO2 SERPL-SCNC: 28 MMOL/L — SIGNIFICANT CHANGE UP (ref 22–31)
CREAT SERPL-MCNC: 0.62 MG/DL — SIGNIFICANT CHANGE UP (ref 0.5–1.3)
EOSINOPHIL # BLD AUTO: 0 K/UL — SIGNIFICANT CHANGE UP (ref 0–0.5)
EOSINOPHIL NFR BLD AUTO: 0.6 % — SIGNIFICANT CHANGE UP (ref 0–6)
GLUCOSE SERPL-MCNC: 97 MG/DL — SIGNIFICANT CHANGE UP (ref 70–99)
HCT VFR BLD CALC: 38.9 % — LOW (ref 39–50)
HGB BLD-MCNC: 12.6 G/DL — LOW (ref 13–17)
INR BLD: 1.04 RATIO — SIGNIFICANT CHANGE UP (ref 0.88–1.16)
LACTATE SERPL-SCNC: 2 MMOL/L — SIGNIFICANT CHANGE UP (ref 0.7–2)
LYMPHOCYTES # BLD AUTO: 0.5 K/UL — LOW (ref 1–3.3)
LYMPHOCYTES # BLD AUTO: 7.2 % — LOW (ref 13–44)
MCHC RBC-ENTMCNC: 32.3 GM/DL — SIGNIFICANT CHANGE UP (ref 32–36)
MCHC RBC-ENTMCNC: 33.6 PG — SIGNIFICANT CHANGE UP (ref 27–34)
MCV RBC AUTO: 104.1 FL — HIGH (ref 80–100)
MONOCYTES # BLD AUTO: 0.9 K/UL — SIGNIFICANT CHANGE UP (ref 0–0.9)
MONOCYTES NFR BLD AUTO: 11.7 % — HIGH (ref 1–9)
NEUTROPHILS # BLD AUTO: 5.8 K/UL — SIGNIFICANT CHANGE UP (ref 1.8–7.4)
NEUTROPHILS NFR BLD AUTO: 77.7 % — HIGH (ref 43–77)
PLATELET # BLD AUTO: 235 K/UL — SIGNIFICANT CHANGE UP (ref 150–400)
POTASSIUM SERPL-MCNC: 4.3 MMOL/L — SIGNIFICANT CHANGE UP (ref 3.5–5.3)
POTASSIUM SERPL-SCNC: 4.3 MMOL/L — SIGNIFICANT CHANGE UP (ref 3.5–5.3)
PROT SERPL-MCNC: 8.2 G/DL — SIGNIFICANT CHANGE UP (ref 6–8.3)
PROTHROM AB SERPL-ACNC: 11.4 SEC — SIGNIFICANT CHANGE UP (ref 9.8–12.7)
RBC # BLD: 3.74 M/UL — LOW (ref 4.2–5.8)
RBC # FLD: 14.9 % — HIGH (ref 10.3–14.5)
SODIUM SERPL-SCNC: 133 MMOL/L — LOW (ref 135–145)
WBC # BLD: 7.4 K/UL — SIGNIFICANT CHANGE UP (ref 3.8–10.5)
WBC # FLD AUTO: 7.4 K/UL — SIGNIFICANT CHANGE UP (ref 3.8–10.5)

## 2017-10-18 PROCEDURE — 99285 EMERGENCY DEPT VISIT HI MDM: CPT

## 2017-10-18 PROCEDURE — 99223 1ST HOSP IP/OBS HIGH 75: CPT | Mod: AI,GC

## 2017-10-18 PROCEDURE — 71010: CPT | Mod: 26

## 2017-10-18 PROCEDURE — 93010 ELECTROCARDIOGRAM REPORT: CPT

## 2017-10-18 RX ORDER — VANCOMYCIN HCL 1 G
1000 VIAL (EA) INTRAVENOUS EVERY 12 HOURS
Qty: 0 | Refills: 0 | Status: DISCONTINUED | OUTPATIENT
Start: 2017-10-18 | End: 2017-10-20

## 2017-10-18 RX ORDER — GLUCAGON INJECTION, SOLUTION 0.5 MG/.1ML
1 INJECTION, SOLUTION SUBCUTANEOUS ONCE
Qty: 0 | Refills: 0 | Status: DISCONTINUED | OUTPATIENT
Start: 2017-10-18 | End: 2017-10-23

## 2017-10-18 RX ORDER — SODIUM CHLORIDE 9 MG/ML
1000 INJECTION, SOLUTION INTRAVENOUS
Qty: 0 | Refills: 0 | Status: DISCONTINUED | OUTPATIENT
Start: 2017-10-18 | End: 2017-10-23

## 2017-10-18 RX ORDER — DEXTROSE 50 % IN WATER 50 %
12.5 SYRINGE (ML) INTRAVENOUS ONCE
Qty: 0 | Refills: 0 | Status: DISCONTINUED | OUTPATIENT
Start: 2017-10-18 | End: 2017-10-23

## 2017-10-18 RX ORDER — TRAZODONE HCL 50 MG
1 TABLET ORAL
Qty: 0 | Refills: 0 | COMMUNITY

## 2017-10-18 RX ORDER — DEXTROSE 50 % IN WATER 50 %
25 SYRINGE (ML) INTRAVENOUS ONCE
Qty: 0 | Refills: 0 | Status: DISCONTINUED | OUTPATIENT
Start: 2017-10-18 | End: 2017-10-23

## 2017-10-18 RX ORDER — MORPHINE SULFATE 50 MG/1
60 CAPSULE, EXTENDED RELEASE ORAL THREE TIMES A DAY
Qty: 0 | Refills: 0 | Status: DISCONTINUED | OUTPATIENT
Start: 2017-10-18 | End: 2017-10-23

## 2017-10-18 RX ORDER — FUROSEMIDE 40 MG
40 TABLET ORAL DAILY
Qty: 0 | Refills: 0 | Status: DISCONTINUED | OUTPATIENT
Start: 2017-10-18 | End: 2017-10-23

## 2017-10-18 RX ORDER — INSULIN GLARGINE 100 [IU]/ML
34 INJECTION, SOLUTION SUBCUTANEOUS AT BEDTIME
Qty: 0 | Refills: 0 | Status: DISCONTINUED | OUTPATIENT
Start: 2017-10-18 | End: 2017-10-23

## 2017-10-18 RX ORDER — PIPERACILLIN AND TAZOBACTAM 4; .5 G/20ML; G/20ML
3.38 INJECTION, POWDER, LYOPHILIZED, FOR SOLUTION INTRAVENOUS ONCE
Qty: 0 | Refills: 0 | Status: COMPLETED | OUTPATIENT
Start: 2017-10-18 | End: 2017-10-18

## 2017-10-18 RX ORDER — MORPHINE SULFATE 50 MG/1
1 CAPSULE, EXTENDED RELEASE ORAL
Qty: 0 | Refills: 0 | COMMUNITY

## 2017-10-18 RX ORDER — OXYCODONE HYDROCHLORIDE 5 MG/1
30 TABLET ORAL EVERY 6 HOURS
Qty: 0 | Refills: 0 | Status: DISCONTINUED | OUTPATIENT
Start: 2017-10-18 | End: 2017-10-23

## 2017-10-18 RX ORDER — PIPERACILLIN AND TAZOBACTAM 4; .5 G/20ML; G/20ML
3.38 INJECTION, POWDER, LYOPHILIZED, FOR SOLUTION INTRAVENOUS EVERY 8 HOURS
Qty: 0 | Refills: 0 | Status: DISCONTINUED | OUTPATIENT
Start: 2017-10-19 | End: 2017-10-21

## 2017-10-18 RX ORDER — METFORMIN HYDROCHLORIDE 850 MG/1
1 TABLET ORAL
Qty: 0 | Refills: 0 | COMMUNITY

## 2017-10-18 RX ORDER — DEXTROSE 50 % IN WATER 50 %
1 SYRINGE (ML) INTRAVENOUS ONCE
Qty: 0 | Refills: 0 | Status: DISCONTINUED | OUTPATIENT
Start: 2017-10-18 | End: 2017-10-23

## 2017-10-18 RX ORDER — PANTOPRAZOLE SODIUM 20 MG/1
40 TABLET, DELAYED RELEASE ORAL
Qty: 0 | Refills: 0 | Status: DISCONTINUED | OUTPATIENT
Start: 2017-10-18 | End: 2017-10-23

## 2017-10-18 RX ORDER — VANCOMYCIN HCL 1 G
1000 VIAL (EA) INTRAVENOUS ONCE
Qty: 0 | Refills: 0 | Status: COMPLETED | OUTPATIENT
Start: 2017-10-18 | End: 2017-10-19

## 2017-10-18 RX ORDER — HEPARIN SODIUM 5000 [USP'U]/ML
5000 INJECTION INTRAVENOUS; SUBCUTANEOUS EVERY 12 HOURS
Qty: 0 | Refills: 0 | Status: DISCONTINUED | OUTPATIENT
Start: 2017-10-18 | End: 2017-10-23

## 2017-10-18 RX ORDER — INSULIN LISPRO 100/ML
VIAL (ML) SUBCUTANEOUS
Qty: 0 | Refills: 0 | Status: DISCONTINUED | OUTPATIENT
Start: 2017-10-18 | End: 2017-10-23

## 2017-10-18 RX ADMIN — Medication 2 MILLIGRAM(S): at 23:32

## 2017-10-18 RX ADMIN — MORPHINE SULFATE 60 MILLIGRAM(S): 50 CAPSULE, EXTENDED RELEASE ORAL at 22:34

## 2017-10-18 RX ADMIN — PIPERACILLIN AND TAZOBACTAM 200 GRAM(S): 4; .5 INJECTION, POWDER, LYOPHILIZED, FOR SOLUTION INTRAVENOUS at 18:00

## 2017-10-18 RX ADMIN — INSULIN GLARGINE 34 UNIT(S): 100 INJECTION, SOLUTION SUBCUTANEOUS at 22:33

## 2017-10-18 NOTE — H&P ADULT - ATTENDING COMMENTS
62 y/o w/m h/o liver CA, alcoholic pancreatitis, Hep-C , HTN , IDDM admitted for cellulitis of bilateral lower extremities. Continue Vanco and zosyn.  Wound care consulted.  F/U cultures.  Med Rec is incomplete.  Please call [facility] in the AM to get complete medication list.

## 2017-10-18 NOTE — H&P ADULT - NSHPPHYSICALEXAM_GEN_ALL_CORE
Physical Exam:  General: Well developed, thin, in mild distress  HEENT: Normocephallic Atraumatic, PERRLA, EOMI bl, moist mucous membranes   Neck: Supple, nontender, no mass  Neurology: AA&Ox3, CN II-XII grossly intact, sensation intact  Respiratory: Clear To Auscultation B/L, No Wheezes, rhonchi or rales  CV: Regular Rate and Rhythm, +S1/S2, no murmurs, rubs or gallops  Abdominal: distended, firm, tender +Bowel Soundsx4  Extremities: right lower extremity: erythema and swelling foot and 2/3 shin/calf with ulcers over shin                    left lower extremity: erythema and swelling, foot to above knee, ulcers on top of foot                    warm to touch bilaterally  Musculoskeletal: Normal Range of motion  Skin: erythematous skin over lower extremities

## 2017-10-18 NOTE — H&P ADULT - NSHPSOCIALHISTORY_GEN_ALL_CORE
Lives in private residence with visiting nursing service.  Former smoker. History of alcohol use, Denies current ETOH use. Denies recreational drug use

## 2017-10-18 NOTE — ED PROVIDER NOTE - PSH
[facility]    [address]    [facility] [address]    Phone:  [phone]           Rivas Doe   MRN: [id number]    Department:  [facility]   Date of Visit:  5/5/2017           Diagnosis     External hemorrhoid        You were seen by Angelica Nevarez MD.      Disclaimer     Follow-up Care:  It is your responsibility to arrange for follow-up care with your healthcare provider or as instructed. Call to get an appointment time.           Contact your doctor for follow-up appointment if not already scheduled.     Follow up with Kev Cabrera MD. Schedule an appointment as soon as possible for a visit in 3 days.    Specialty:  Colon & Rectal Surgery    Comments:  Call on Monday to schedule appointment if the pain and swollen hemorrhoid are not improved.  If the \"lump\" gets bigger or skin around it gets firmer to the touch or you have fever, return to ER.    Contact information    [address]  [address]  [facility] [address]  [phone]        Preventive care and screening     Your blood pressure was 124/77 today. If your blood pressure is higher than 120/80, we recommend follow up with your primary care provider to obtain basic health screening, including reassessment of your blood pressure, within three months.          Medications you received while in the ED through 05/05/2017  8:42 AM     None         What to Do with Your Medications      START taking these medications today unless otherwise stated        Details    dibucaine 1 % ointment   Commonly known as:  NUPERCAINAL        Apply 5 times daily to rectal area as needed for pain   Authorizing Provider:  Angelica Nevarez                             Where to Get Your Medications      You can get these medications from any pharmacy     Bring a paper prescription for each of these medications     dibucaine 1 % ointment               Procedures     None      Imaging Results     None        Discharge  Instructions         Hemorrhoids    Hemorrhoids are swollen and inflamed veins inside the rectum and near the anus. The rectum is the last several inches of the colon. The anus is the passage between the rectum and the outside of the body.  Causes   The veins can become swollen due to increased pressure in them. This is most often caused by:  · Chronic constipation or diarrhea  · Straining when having a bowel movement  · Sitting too long on the toilet  · A low-fiber diet  · Pregnancy  Symptoms   · Bleeding from the rectum (this may be noticeable after bowel movements)  · Lump near the anus  · Itching around the anus  · Pain around the anus  There are different types of hemorrhoids. Depending on the type you have and the severity, you may be able to treat yourself at home. In some cases, a procedure may be the best treatment option. Your healthcare provider can tell you more about this, if needed.  Home care  General care  · To get relief from pain or itching, try:  ¨ Topical products. Your healthcare provider may prescribe or recommend creams, ointments, or pads that can be applied to the hemorrhoid. Use these exactly as directed.  ¨ Medicines. Your healthcare provider may recommend stool softeners, suppositories, or laxatives to help manage constipation. Use these exactly as directed.  ¨ Sitz baths. A sitz bath involves sitting in a few inches of warm bath water. Be careful not to make the water so hot that you burn yourself--test it before sitting in it. Soak for about 10 to 15 minutes a few times a day. This may help relieve pain.  Tips to help prevent hemorrhoids  · Eat more fiber. Fiber adds bulk to stool and absorbs water as it moves through your colon. This makes stool softer and easier to pass.  ¨ Increase the fiber in your diet with more fiber-rich foods. These include fresh fruit, vegetables, and whole grains.  ¨ Take a fiber supplement or bulking agent, if advised to by your provider. These include  products such as psyllium or methylcellulose.  · Drink plenty of water, if directed to by your provider. This can help keep stool soft.  · Be more active. Frequent exercise aids digestion and helps prevent constipation. It may also help make bowel movements more regular.  · Don’t strain during bowel movements. This can make hemorrhoids more likely. Also, don’t sit on the toilet for long periods of time.  Follow-up care  Follow up with your healthcare provider, or as advised. If a culture or imaging tests were done, you will be notified of the results when they are ready. This may take a few days or longer.  When to seek medical advice  Call your healthcare provider right away if any of these occur:  · Increased bleeding from the rectum  · Increased pain around the rectum or anus  · Weakness or dizziness   Call 911   Call 911 or return to the emergency department right away if any of these occur:  · Trouble breathing or swallowing  · Fainting or loss of consciousness  · Unusually fast heart rate  · Vomiting blood  · Large amounts of blood in stool     © 2012-5043 [employer]. [address] [place] [address]. All rights reserved. This information is not intended as a substitute for professional medical care. Always follow your healthcare professional's instructions.          Discharge References/Attachments     None      Medication Safety: What you need to know     Maintain Security - It is important to keep all medications in a secure location:  Keep out of the reach of children and pets    Consider using a lock box or locked filing cabinet    Place pill bottles in private area such as bedroom or drawer    Don't Share - It is illegal to share your prescription medication, even with family:  The doctor prescribes medications specifically for you and your body    You cannot be sure how the drug may affect others physically or emotionally    It is a criminal offense to share  prescriptions    Proper Disposal - It is no longer acceptable to flush or throw away medications:  Recent studies show measurable amounts of medication have been found in drinking water and wildlife due to flushing or throwing away medications    Medication strength changes over time and is not typically safe after one year    Proper disposal removes the medication from your home in a safe way so that others don't have access to it. Use your local drug drop site.    Your local pharmacy can provide information on medication disposal options in your community. The Department of [employer] Drug Enforcement Administration website also has information on safe medication disposal:  www.deadiversion.usdoj.gov/drug_disposal/index.html         History of cholecystectomy    S/P arthroscopy of right knee    S/P inguinal hernia repair  left  S/P shoulder surgery  bilateral

## 2017-10-18 NOTE — H&P ADULT - HISTORY OF PRESENT ILLNESS
62 y/o w/m h/o liver CA, alcoholic pancreatitis, Hep-C , HTN , IDDM presents to the ED with bilateral cellulitis of his lower extremities. Patient states that approximately 2 weeks ago he scraped his right knee and 4 days later he noticed swelling 60 y/o w/m h/o liver CA, alcoholic pancreatitis, Hep-C , HTN , IDDM presents to the ED with bilateral cellulitis of his lower extremities. Patient states that approximately 2 weeks ago he scraped his right knee and 4 days later he noticed swelling around his right knee. The swelling spread to his left leg and then he began developing blisters on the dorsal surfaces of his left foot and right shin. He describes his legs as being in pulsing pain. Earlier today he went to a podiatrist, who directed him to wound care, which advised him to come to the ED. He denies fever, chills, headache, nausea, vomiting, diarrhea. He admits shortness of breath when walking, relating the pain in his feet/    In ED VS T max 98.1 /69  RR 16 O2 sat 99 on room air WBC within normal limits lactate 2.0 H/H 12.6/38.9 .1 Na 133 BUN 26 Alk phos 354 AST 59  Received vancomycin, zosyn  CXR No active pulmonary disease  12 lead EKG sinus tachycardia with premature atrial complexes, 106 bpm

## 2017-10-18 NOTE — H&P ADULT - PROBLEM SELECTOR PLAN 1
admit to medical floor  continue zosyn, vancomycin  Wound care consult  elevate legs  continue lasix, patient's home med for lower extremity edema admit to medical floor  continue zosyn, vancomycin  follow up blood cultures  Wound care consult  elevate legs  continue lasix, patient's home med for lower extremity edema

## 2017-10-18 NOTE — ED ADULT NURSE NOTE - OBJECTIVE STATEMENT
Patient is alert and oriented x3. Abdomen distended due to Liver CA. labs sent ABT in progress No acute distress noted. MSpencer

## 2017-10-18 NOTE — H&P ADULT - ASSESSMENT
62 y/o w/m h/o liver CA, alcoholic pancreatitis, Hep-C , HTN , IDDM admitted for cellulitis of bilateral lower extremities

## 2017-10-18 NOTE — H&P ADULT - NSHPREVIEWOFSYSTEMS_GEN_ALL_CORE
Constitutional: Denies fever, chills, general malaise, weight loss, weight gain, diaphoresis   HEENT: Denies sore throat, runny nose, photophobia, blurry vision, double vision, eye pain, difficulty hearing, dizziness, dysphagia, epistaxis  Respiratory: Admits shortness of breath when walking relating to pain in his feet, Denies cough, sputum production, wheezing, hemoptysis  Cardiovascular: Denies chest pain, palpitations, edema  Gastrointestinal: Denies nausea, vomiting, diarrhea, constipation, abdominal pain, melena, hematochezia   Genitourinary: Denies dysuria, hematuria, frequency, urgency, incontinence  Skin/Breast: Admits erythema, oozing blisters in bilateral lower extremities   Musculoskeletal: Admits swelling, pain in bilateral lower extremities  Neurologic: Denies syncope, loss of consciousness, headache, weakness, dizziness, paresthesias, numbness, tingling, confusion, dementia   Psychiatric: Denies feeling anxious, depressed, suicidal, or homicidal thoughts  Endocrine: Denies cold or heat intolerance, polydipsia, polyphagia   Hematology/Oncology: Denies abnormal bruising, tender or enlarged lymph nodes   ROS negative except as noted above

## 2017-10-18 NOTE — ED PROVIDER NOTE - OBJECTIVE STATEMENT
pt referred from wound center for b/l le cellulitis x 1 week. no fevers, chills, d/n/v.  pmd - fishbein

## 2017-10-18 NOTE — ED PROVIDER NOTE - SKIN, MLM
Skin normal color for race, warm, dry. right leg stage 2 ulcer to shin, blisters to dorsum. left leg stage 2 ulcer dorsum of foot. b/l lower leg celllulitis

## 2017-10-18 NOTE — ED ADULT NURSE REASSESSMENT NOTE - NS ED NURSE REASSESS COMMENT FT1
fingerstick 115 mg/dl  noted pt medicated for pain scale 8  as ordered
pt reavaluated  vital signs stable bilateral lower extremities redness  lt leg  with open wonds noted over foot and rt leg redness with knee with dry blackish necrotic tissue notedand blister over foot area pt medicated awaiting bed assignment ekg done and evaluated by MD

## 2017-10-19 ENCOUNTER — TRANSCRIPTION ENCOUNTER (OUTPATIENT)
Age: 62
End: 2017-10-19

## 2017-10-19 DIAGNOSIS — R18.8 OTHER ASCITES: ICD-10-CM

## 2017-10-19 DIAGNOSIS — I83.008 VARICOSE VEINS OF UNSPECIFIED LOWER EXTREMITY WITH ULCER OTHER PART OF LOWER LEG: ICD-10-CM

## 2017-10-19 DIAGNOSIS — K74.60 UNSPECIFIED CIRRHOSIS OF LIVER: ICD-10-CM

## 2017-10-19 DIAGNOSIS — C22.0 LIVER CELL CARCINOMA: ICD-10-CM

## 2017-10-19 DIAGNOSIS — I10 ESSENTIAL (PRIMARY) HYPERTENSION: ICD-10-CM

## 2017-10-19 DIAGNOSIS — R14.0 ABDOMINAL DISTENSION (GASEOUS): ICD-10-CM

## 2017-10-19 LAB
ANION GAP SERPL CALC-SCNC: 5 MMOL/L — SIGNIFICANT CHANGE UP (ref 5–17)
BASOPHILS # BLD AUTO: 0.1 K/UL — SIGNIFICANT CHANGE UP (ref 0–0.2)
BASOPHILS NFR BLD AUTO: 1.3 % — SIGNIFICANT CHANGE UP (ref 0–2)
BUN SERPL-MCNC: 24 MG/DL — HIGH (ref 7–23)
CALCIUM SERPL-MCNC: 7.8 MG/DL — LOW (ref 8.5–10.1)
CHLORIDE SERPL-SCNC: 99 MMOL/L — SIGNIFICANT CHANGE UP (ref 96–108)
CO2 SERPL-SCNC: 29 MMOL/L — SIGNIFICANT CHANGE UP (ref 22–31)
CREAT SERPL-MCNC: 0.48 MG/DL — LOW (ref 0.5–1.3)
EOSINOPHIL # BLD AUTO: 0.1 K/UL — SIGNIFICANT CHANGE UP (ref 0–0.5)
EOSINOPHIL NFR BLD AUTO: 0.8 % — SIGNIFICANT CHANGE UP (ref 0–6)
GLUCOSE SERPL-MCNC: 68 MG/DL — LOW (ref 70–99)
HCT VFR BLD CALC: 33 % — LOW (ref 39–50)
HGB BLD-MCNC: 11 G/DL — LOW (ref 13–17)
LYMPHOCYTES # BLD AUTO: 0.4 K/UL — LOW (ref 1–3.3)
LYMPHOCYTES # BLD AUTO: 5.8 % — LOW (ref 13–44)
MCHC RBC-ENTMCNC: 33.2 GM/DL — SIGNIFICANT CHANGE UP (ref 32–36)
MCHC RBC-ENTMCNC: 34 PG — SIGNIFICANT CHANGE UP (ref 27–34)
MCV RBC AUTO: 102.4 FL — HIGH (ref 80–100)
MONOCYTES # BLD AUTO: 0.9 K/UL — SIGNIFICANT CHANGE UP (ref 0–0.9)
MONOCYTES NFR BLD AUTO: 13.7 % — HIGH (ref 1–9)
NEUTROPHILS # BLD AUTO: 5.1 K/UL — SIGNIFICANT CHANGE UP (ref 1.8–7.4)
NEUTROPHILS NFR BLD AUTO: 78.3 % — HIGH (ref 43–77)
PLATELET # BLD AUTO: 174 K/UL — SIGNIFICANT CHANGE UP (ref 150–400)
POTASSIUM SERPL-MCNC: 3.8 MMOL/L — SIGNIFICANT CHANGE UP (ref 3.5–5.3)
POTASSIUM SERPL-SCNC: 3.8 MMOL/L — SIGNIFICANT CHANGE UP (ref 3.5–5.3)
RBC # BLD: 3.22 M/UL — LOW (ref 4.2–5.8)
RBC # FLD: 14.4 % — SIGNIFICANT CHANGE UP (ref 10.3–14.5)
SODIUM SERPL-SCNC: 133 MMOL/L — LOW (ref 135–145)
WBC # BLD: 6.5 K/UL — SIGNIFICANT CHANGE UP (ref 3.8–10.5)
WBC # FLD AUTO: 6.5 K/UL — SIGNIFICANT CHANGE UP (ref 3.8–10.5)

## 2017-10-19 PROCEDURE — 99233 SBSQ HOSP IP/OBS HIGH 50: CPT | Mod: GC

## 2017-10-19 PROCEDURE — 82009 KETONE BODYS QUAL: CPT

## 2017-10-19 PROCEDURE — 83036 HEMOGLOBIN GLYCOSYLATED A1C: CPT

## 2017-10-19 PROCEDURE — 70450 CT HEAD/BRAIN W/O DYE: CPT

## 2017-10-19 PROCEDURE — 85730 THROMBOPLASTIN TIME PARTIAL: CPT

## 2017-10-19 PROCEDURE — 82803 BLOOD GASES ANY COMBINATION: CPT

## 2017-10-19 PROCEDURE — 84132 ASSAY OF SERUM POTASSIUM: CPT

## 2017-10-19 PROCEDURE — 80307 DRUG TEST PRSMV CHEM ANLYZR: CPT

## 2017-10-19 PROCEDURE — 84100 ASSAY OF PHOSPHORUS: CPT

## 2017-10-19 PROCEDURE — 96375 TX/PRO/DX INJ NEW DRUG ADDON: CPT

## 2017-10-19 PROCEDURE — 85027 COMPLETE CBC AUTOMATED: CPT

## 2017-10-19 PROCEDURE — 81001 URINALYSIS AUTO W/SCOPE: CPT

## 2017-10-19 PROCEDURE — 99222 1ST HOSP IP/OBS MODERATE 55: CPT

## 2017-10-19 PROCEDURE — 97162 PT EVAL MOD COMPLEX 30 MIN: CPT

## 2017-10-19 PROCEDURE — 82435 ASSAY OF BLOOD CHLORIDE: CPT

## 2017-10-19 PROCEDURE — 83605 ASSAY OF LACTIC ACID: CPT

## 2017-10-19 PROCEDURE — 82140 ASSAY OF AMMONIA: CPT

## 2017-10-19 PROCEDURE — 85610 PROTHROMBIN TIME: CPT

## 2017-10-19 PROCEDURE — 83735 ASSAY OF MAGNESIUM: CPT

## 2017-10-19 PROCEDURE — 99285 EMERGENCY DEPT VISIT HI MDM: CPT | Mod: 25

## 2017-10-19 PROCEDURE — 83615 LACTATE (LD) (LDH) ENZYME: CPT

## 2017-10-19 PROCEDURE — 82947 ASSAY GLUCOSE BLOOD QUANT: CPT

## 2017-10-19 PROCEDURE — 80053 COMPREHEN METABOLIC PANEL: CPT

## 2017-10-19 PROCEDURE — 97116 GAIT TRAINING THERAPY: CPT

## 2017-10-19 PROCEDURE — 84295 ASSAY OF SERUM SODIUM: CPT

## 2017-10-19 PROCEDURE — 80048 BASIC METABOLIC PNL TOTAL CA: CPT

## 2017-10-19 PROCEDURE — 97530 THERAPEUTIC ACTIVITIES: CPT

## 2017-10-19 PROCEDURE — 85014 HEMATOCRIT: CPT

## 2017-10-19 PROCEDURE — 96374 THER/PROPH/DIAG INJ IV PUSH: CPT

## 2017-10-19 PROCEDURE — 76700 US EXAM ABDOM COMPLETE: CPT | Mod: 26

## 2017-10-19 PROCEDURE — 82330 ASSAY OF CALCIUM: CPT

## 2017-10-19 PROCEDURE — 87086 URINE CULTURE/COLONY COUNT: CPT

## 2017-10-19 PROCEDURE — 93005 ELECTROCARDIOGRAM TRACING: CPT

## 2017-10-19 RX ORDER — LANOLIN ALCOHOL/MO/W.PET/CERES
3 CREAM (GRAM) TOPICAL AT BEDTIME
Qty: 0 | Refills: 0 | Status: DISCONTINUED | OUTPATIENT
Start: 2017-10-19 | End: 2017-10-20

## 2017-10-19 RX ORDER — INFLUENZA VIRUS VACCINE 15; 15; 15; 15 UG/.5ML; UG/.5ML; UG/.5ML; UG/.5ML
0.5 SUSPENSION INTRAMUSCULAR ONCE
Qty: 0 | Refills: 0 | Status: DISCONTINUED | OUTPATIENT
Start: 2017-10-19 | End: 2017-10-23

## 2017-10-19 RX ORDER — MUPIROCIN 20 MG/G
1 OINTMENT TOPICAL DAILY
Qty: 0 | Refills: 0 | Status: DISCONTINUED | OUTPATIENT
Start: 2017-10-19 | End: 2017-10-23

## 2017-10-19 RX ADMIN — MORPHINE SULFATE 60 MILLIGRAM(S): 50 CAPSULE, EXTENDED RELEASE ORAL at 21:47

## 2017-10-19 RX ADMIN — OXYCODONE HYDROCHLORIDE 30 MILLIGRAM(S): 5 TABLET ORAL at 01:00

## 2017-10-19 RX ADMIN — Medication 1: at 13:28

## 2017-10-19 RX ADMIN — OXYCODONE HYDROCHLORIDE 30 MILLIGRAM(S): 5 TABLET ORAL at 00:25

## 2017-10-19 RX ADMIN — PANTOPRAZOLE SODIUM 40 MILLIGRAM(S): 20 TABLET, DELAYED RELEASE ORAL at 09:44

## 2017-10-19 RX ADMIN — INSULIN GLARGINE 34 UNIT(S): 100 INJECTION, SOLUTION SUBCUTANEOUS at 21:47

## 2017-10-19 RX ADMIN — HEPARIN SODIUM 5000 UNIT(S): 5000 INJECTION INTRAVENOUS; SUBCUTANEOUS at 06:49

## 2017-10-19 RX ADMIN — Medication 250 MILLIGRAM(S): at 17:57

## 2017-10-19 RX ADMIN — Medication 2: at 17:59

## 2017-10-19 RX ADMIN — OXYCODONE HYDROCHLORIDE 30 MILLIGRAM(S): 5 TABLET ORAL at 08:04

## 2017-10-19 RX ADMIN — Medication 40 MILLIGRAM(S): at 06:49

## 2017-10-19 RX ADMIN — OXYCODONE HYDROCHLORIDE 30 MILLIGRAM(S): 5 TABLET ORAL at 13:32

## 2017-10-19 RX ADMIN — PIPERACILLIN AND TAZOBACTAM 25 GRAM(S): 4; .5 INJECTION, POWDER, LYOPHILIZED, FOR SOLUTION INTRAVENOUS at 09:47

## 2017-10-19 RX ADMIN — MORPHINE SULFATE 60 MILLIGRAM(S): 50 CAPSULE, EXTENDED RELEASE ORAL at 22:30

## 2017-10-19 RX ADMIN — MUPIROCIN 1 APPLICATION(S): 20 OINTMENT TOPICAL at 17:53

## 2017-10-19 RX ADMIN — MORPHINE SULFATE 60 MILLIGRAM(S): 50 CAPSULE, EXTENDED RELEASE ORAL at 13:32

## 2017-10-19 RX ADMIN — Medication 250 MILLIGRAM(S): at 17:53

## 2017-10-19 RX ADMIN — Medication 250 MILLIGRAM(S): at 06:50

## 2017-10-19 RX ADMIN — PIPERACILLIN AND TAZOBACTAM 25 GRAM(S): 4; .5 INJECTION, POWDER, LYOPHILIZED, FOR SOLUTION INTRAVENOUS at 02:30

## 2017-10-19 RX ADMIN — OXYCODONE HYDROCHLORIDE 30 MILLIGRAM(S): 5 TABLET ORAL at 17:57

## 2017-10-19 RX ADMIN — MORPHINE SULFATE 60 MILLIGRAM(S): 50 CAPSULE, EXTENDED RELEASE ORAL at 06:49

## 2017-10-19 RX ADMIN — HEPARIN SODIUM 5000 UNIT(S): 5000 INJECTION INTRAVENOUS; SUBCUTANEOUS at 17:54

## 2017-10-19 NOTE — CONSULT NOTE ADULT - SUBJECTIVE AND OBJECTIVE BOX
Chief Complaint: Bilateral leg swelling and ulcer    HPI: Patient with advance Ca of liver has developed redness of his lower extremities for past one week.    PAST MEDICAL & SURGICAL HISTORY:  Liver cancer  Weight loss, unintentional: 45 lbs in 1 yr  Type 2 diabetes mellitus: IDDM  Smoker  Chronic pain with drug dependence: On suboxone now  HTN (hypertension): no medications  Emphysema lung  Depression  Diabetic neuropathy  Chronic alcoholic pancreatitis  Hepatitis C  S/P inguinal hernia repair: left  S/P arthroscopy of right knee  S/P shoulder surgery: bilateral  History of cholecystectomy      Allergies    No Known Allergies    Intolerances        MEDICATIONS  (STANDING):  dextrose 5%. 1000 milliLiter(s) (50 mL/Hr) IV Continuous <Continuous>  dextrose 50% Injectable 12.5 Gram(s) IV Push once  dextrose 50% Injectable 25 Gram(s) IV Push once  furosemide    Tablet 40 milliGRAM(s) Oral daily  heparin  Injectable 5000 Unit(s) SubCutaneous every 12 hours  influenza   Vaccine 0.5 milliLiter(s) IntraMuscular once  insulin glargine Injectable (LANTUS) 34 Unit(s) SubCutaneous at bedtime  insulin lispro (HumaLOG) corrective regimen sliding scale   SubCutaneous three times a day before meals  morphine ER Tablet 60 milliGRAM(s) Oral three times a day  oxyCODONE    IR 30 milliGRAM(s) Oral every 6 hours  pantoprazole    Tablet 40 milliGRAM(s) Oral before breakfast  piperacillin/tazobactam IVPB. 3.375 Gram(s) IV Intermittent every 8 hours  vancomycin  IVPB 1000 milliGRAM(s) IV Intermittent every 12 hours  vancomycin  IVPB 1000 milliGRAM(s) IV Intermittent once    MEDICATIONS  (PRN):  dextrose Gel 1 Dose(s) Oral once PRN Blood Glucose LESS THAN 70 milliGRAM(s)/deciliter  glucagon  Injectable 1 milliGRAM(s) IntraMuscular once PRN Glucose LESS THAN 70 milligrams/deciliter      FAMILY HISTORY:  No pertinent family history in first degree relatives          ROS:  CONSTITUTIONAL: No fever, weight loss, or fatigue  EYES: No eye pain, visual disturbances, or discharge  ENMT:  No difficulty hearing, tinnitus, vertigo; No sinus or throat pain  NECK: No pain or stiffness  BREASTS: No pain, masses, or nipple discharge  RESPIRATORY: No cough, wheezing, chills or hemoptysis; No shortness of breath  CARDIOVASCULAR: No chest pain, palpitations, dizziness, or leg swelling  GASTROINTESTINAL: No abdominal or epigastric pain. No nausea, vomiting, or hematemesis; No diarrhea or constipation. No melena or hematochezia.  GENITOURINARY: No dysuria, frequency, hematuria, or incontinence  NEUROLOGICAL: No headaches, memory loss, loss of strength, numbness, or tremors  SKIN: No itching, burning, rashes, or lesions   LYMPH NODES: No enlarged glands  ENDOCRINE: No heat or cold intolerance; No hair loss  MUSCULOSKELETAL: No joint pain or swelling; No muscle, back, or extremity pain  PSYCHIATRIC: No depression, anxiety, mood swings, or difficulty sleeping  HEME/LYMPH: No easy bruising, or bleeding gums  ALLERGY AND IMMUNOLOGIC: No hives or eczema    PHYSICAL EXAM-    Height (cm): 182.88 (10-18 @ 17:03)  Weight (kg): 65.8 (10-18 @ 17:03)  BMI (kg/m2): 19.7 (10-18 @ 17:03)  Vital Signs Last 24 Hrs  T(C): 36.4 (19 Oct 2017 13:51), Max: 36.8 (19 Oct 2017 05:23)  T(F): 97.6 (19 Oct 2017 13:51), Max: 98.2 (19 Oct 2017 05:23)  HR: 106 (19 Oct 2017 13:51) (89 - 125)  BP: 126/70 (19 Oct 2017 13:51) (126/70 - 160/93)  BP(mean): --  RR: 18 (19 Oct 2017 13:51) (14 - 18)  SpO2: 97% (19 Oct 2017 13:51) (97% - 99%)    Constitutional: well developed, well nourished, no apparent distress, alert, oriented x 3.  Neck: Supple   Pulmonary: no respiratory distress, normal respiratory rhythm and effort, lungs are clear to auscultation/percussion. No CVA tenderness.  Cardiovascular: heart rate normal, normal sinus rhythm; no murmurs, gallops, rubs, heaves or thrills   Abdomen: soft, non-tender, +BS, no guarding/rebound/rigidity.  Vascular: Lower extremities are well perfused.   Extremities: Bilateral edema, non tender calves  Skin: Chronic venous insufficiency hyperpigmentation, multiple small stasis ulcer, leg is slightly warm and red, less weeping today                             11.0   6.5   )-----------( 174      ( 19 Oct 2017 07:41 )             33.0     10-19    133<L>  |  99  |  24<H>  ----------------------------<  68<L>  3.8   |  29  |  0.48<L>    Ca    7.8<L>      19 Oct 2017 07:41    TPro  6.7  /  Alb  1.7<L>  /  TBili  0.8  /  DBili  .40<H>  /  AST  50<H>  /  ALT  46  /  AlkPhos  297<H>  10-19      Radiology:

## 2017-10-19 NOTE — DISCHARGE NOTE ADULT - CARE PROVIDERS DIRECT ADDRESSES
,DirectAddress_Unknown,DirectAddress_Unknown,[email or web address],DirectAddress_Unknown

## 2017-10-19 NOTE — DISCHARGE NOTE ADULT - ADDITIONAL INSTRUCTIONS
Please follow up with your PMD, Dr. Guajardo within 1 week of discharge. Please follow up with your PMD, Dr. Guajardo within 1 week of discharge.  Wound Care Instructions:  Cleanse with NS, apply adaptic, Bactroban 4X4abd and Anne. Please Make an appointment with wound care center.

## 2017-10-19 NOTE — PROGRESS NOTE ADULT - PROBLEM SELECTOR PLAN 2
Given extensive hx of hepatic pathology, hep c, hepatic ca, etoh abuse, ?cirrhosis - possible distention 2/2 ascites   - pt on lasix, monitor distention Given extensive hx of hepatic pathology, hep c, hepatic ca, etoh abuse, ?cirrhosis - possible distention 2/2 ascites ; will need to r/out SBP  - pt on lasix, monitor distention  -abd US to eval amount of ascites

## 2017-10-19 NOTE — DISCHARGE NOTE ADULT - MEDICATION SUMMARY - MEDICATIONS TO TAKE
I will START or STAY ON the medications listed below when I get home from the hospital:    oxyCODONE 30 mg oral tablet  -- 1 tab(s) by mouth every 6 hours  -- Indication: For Pain    morphine 60 mg/24 hours oral capsule, extended release  -- 1 cap(s) by mouth every 8 hours  -- Indication: For Pain    Levemir 100 units/mL subcutaneous solution  -- 40 units sq daily  -- Indication: For Diabetes mellitus    Lasix 40 mg oral tablet  -- 1 tab(s) by mouth once a day  -- Indication: For Essential hypertension    minocycline 100 mg oral capsule  -- 1 cap(s) by mouth twice   -- Avoid prolonged or excessive exposure to direct and/or artificial sunlight while taking this medication.  Do not take this drug if you are pregnant.  Finish all this medication unless otherwise directed by prescriber.  May cause drowsiness or dizziness.  Medication should be taken with plenty of water.    -- Indication: For Cellulitis of extremity I will START or STAY ON the medications listed below when I get home from the hospital:    Rolling Walker   -- Indication: For Walker    oxyCODONE 30 mg oral tablet  -- 1 tab(s) by mouth every 6 hours  -- Indication: For Pain    morphine 60 mg/24 hours oral capsule, extended release  -- 1 cap(s) by mouth every 8 hours  -- Indication: For Pain    Levemir 100 units/mL subcutaneous solution  -- 40 units sq daily  -- Indication: For Diabetes mellitus    Lasix 40 mg oral tablet  -- 1 tab(s) by mouth once a day  -- Indication: For Essential hypertension    minocycline 100 mg oral capsule  -- 1 cap(s) by mouth twice   -- Avoid prolonged or excessive exposure to direct and/or artificial sunlight while taking this medication.  Do not take this drug if you are pregnant.  Finish all this medication unless otherwise directed by prescriber.  May cause drowsiness or dizziness.  Medication should be taken with plenty of water.    -- Indication: For Cellulitis of extremity

## 2017-10-19 NOTE — DISCHARGE NOTE ADULT - SECONDARY DIAGNOSIS.
Carcinoma of liver Chronic alcoholic pancreatitis Depression Diabetic neuropathy Essential hypertension Type 2 diabetes mellitus Abdominal distension

## 2017-10-19 NOTE — DISCHARGE NOTE ADULT - CARE PLAN
Principal Discharge DX:	Cellulitis of lower extremity, unspecified laterality  Secondary Diagnosis:	Carcinoma of liver  Secondary Diagnosis:	Chronic alcoholic pancreatitis  Secondary Diagnosis:	Depression  Secondary Diagnosis:	Diabetic neuropathy  Secondary Diagnosis:	Essential hypertension  Secondary Diagnosis:	Type 2 diabetes mellitus Principal Discharge DX:	Cellulitis of lower extremity, unspecified laterality  Goal:	Resolution  Instructions for follow-up, activity and diet:	You were found to have an infection of the skin of your lower extremities. You were given IV antibiotics while in the hospital with improvement of symptoms. Please continue minocycline 100 mg, twice everyday x 7 days. Wound care, Dr. Castellon was consulted and Bactroban was applied dialy with no dressing for the venous stasis ulcer. Continue to keep your legs elevated. Please follow up with your PMD within one week of discharge.  Secondary Diagnosis:	Carcinoma of liver  Instructions for follow-up, activity and diet:	You have a history of hepatocellular carcinoma. You were seen by heme/onc, Dr. Hon Johnson. Please follow up with your outpatient oncologist for further management.  Secondary Diagnosis:	Essential hypertension  Instructions for follow-up, activity and diet:	You have a history of high blood pressure. Your blood pressure was well controlled on Lasix. Please continue to monitor your blood pressure at home. Follow up with your PMD for further management.  Secondary Diagnosis:	Type 2 diabetes mellitus  Instructions for follow-up, activity and diet:	You have a history of DM2. You were controlled on a Humalog sliding scale was well as Lantus. Please continue on your home regimen. Continue to monitor your fingersticks. Please follow up with your PMD.  Secondary Diagnosis:	Abdominal distension  Instructions for follow-up, activity and diet:	You were found to have abdominal distension. A paracentesis was performed by Dr. Flores (interventional radiology). 60 cc of fluid was removed. Ascities is likely 2/2 to hepatic pathology. Please follow up with your PMD. Principal Discharge DX:	Cellulitis of lower extremity, unspecified laterality  Goal:	Resolution  Instructions for follow-up, activity and diet:	You were found to have an infection of the skin of your lower extremities. You were given IV antibiotics while in the hospital with improvement of symptoms. Please continue minocycline 100 mg, twice everyday x 7 days. Wound care, Dr. Castellon was consulted and Bactroban was applied dialy with no dressing for the venous stasis ulcer. Continue to keep your legs elevated. Please follow up with your PMD within one week of discharge.  Secondary Diagnosis:	Carcinoma of liver  Instructions for follow-up, activity and diet:	You have a history of hepatocellular carcinoma. You were seen by heme/onc, Dr. Hon Johnson. Please follow up with your outpatient oncologist for further management.  Secondary Diagnosis:	Essential hypertension  Instructions for follow-up, activity and diet:	You have a history of high blood pressure. Your blood pressure was well controlled on Lasix. Please continue to monitor your blood pressure at home. Follow up with your PMD for further management.  Secondary Diagnosis:	Type 2 diabetes mellitus  Instructions for follow-up, activity and diet:	You have a history of DM2. You were controlled on a Humalog sliding scale was well as Lantus. Please continue on your home regimen. Continue to monitor your fingersticks. Please follow up with your PMD.  Secondary Diagnosis:	Abdominal distension  Instructions for follow-up, activity and diet:	You were found to have abdominal distension. A paracentesis was performed by Dr. Flores (interventional radiology). 60 cc of fluid was removed. Ascites is likely 2/2 to hepatic pathology. Please follow up with your PMD.

## 2017-10-19 NOTE — PROGRESS NOTE ADULT - PROBLEM SELECTOR PLAN 1
No signs of systemic infection at this point, no leukocytosis, afebrile, lactate wnl, await procal. Localized to b/l LE, LLE erythema up to tibial plateau.   - continue zosyn & vancomycin, concerned for MRSA, pt recently seen at wound care  - F/u blood cultures   - wound care consulted, apprec recs  - keep legs elevated  - will continue lasix home med for lower extremity edema

## 2017-10-19 NOTE — CONSULT NOTE ADULT - SUBJECTIVE AND OBJECTIVE BOX
HPI:  60 y/o w/m h/o liver CA, alcoholic pancreatitis, Hep-C , HTN , IDDM presents to the ED with   bilateral LE swelling pain and worsening erythema over past week.  Had scraped his right   leg 2 weeks ago. Noted blisters forming and he went to a podiatrist, who directed him to   wound care, which advised him to come to the ED. He denies fever, chills, headache, nausea,  vomiting, diarrhea. +abdominal distention.     Infectious Disease consult was called to evaluate pt and for antiobiotic mangement.      Past Medical & Surgical Hx:  PAST MEDICAL & SURGICAL HISTORY:  Liver cancer  Weight loss, unintentional: 45 lbs in 1 yr  Type 2 diabetes mellitus: IDDM  Smoker  Chronic pain with drug dependence: On suboxone now  HTN (hypertension): no medications  Emphysema lung  Depression  Diabetic neuropathy  Chronic alcoholic pancreatitis  Hepatitis C  S/P inguinal hernia repair: left  S/P arthroscopy of right knee  S/P shoulder surgery: bilateral  History of cholecystectomy    Social History--  Lives in private residence with visiting nursing service.  Former smoker. History of alcohol use, Denies current ETOH use. Denies recreational drug use      FAMILY HISTORY:  No pertinent family history in first degree relatives    Allergies  No Known Allergies    Home Medications:  Lasix 40 mg oral tablet: 1 tab(s) orally once a day (18 Oct 2017 21:06)  Levemir 100 units/mL subcutaneous solution: 40 units sq daily (18 Oct 2017 21:06)  morphine 60 mg/24 hours oral capsule, extended release: 1 cap(s) orally every 8 hours (18 Oct 2017 21:06)  oxyCODONE 30 mg oral tablet: 1 tab(s) orally every 6 hours (18 Oct 2017 21:06)      Current Inpatient Medications :  ANTIBIOTICS:   piperacillin/tazobactam IVPB. 3.375 Gram(s) IV Intermittent every 8 hours  vancomycin  IVPB 1000 milliGRAM(s) IV Intermittent every 12 hours    OTHER RELEVANT MEDICATIONS :  dextrose 5%. 1000 milliLiter(s) IV Continuous <Continuous>  dextrose 50% Injectable 12.5 Gram(s) IV Push once  dextrose 50% Injectable 25 Gram(s) IV Push once  dextrose Gel 1 Dose(s) Oral once PRN  furosemide    Tablet 40 milliGRAM(s) Oral daily  glucagon  Injectable 1 milliGRAM(s) IntraMuscular once PRN  heparin  Injectable 5000 Unit(s) SubCutaneous every 12 hours  influenza   Vaccine 0.5 milliLiter(s) IntraMuscular once  insulin glargine Injectable (LANTUS) 34 Unit(s) SubCutaneous at bedtime  insulin lispro (HumaLOG) corrective regimen sliding scale   SubCutaneous three times a day before meals  morphine ER Tablet 60 milliGRAM(s) Oral three times a day  mupirocin 2% Ointment 1 Application(s) Topical daily  oxyCODONE    IR 30 milliGRAM(s) Oral every 6 hours  pantoprazole    Tablet 40 milliGRAM(s) Oral before breakfast    ROS:  CONSTITUTIONAL:  Negative fever or chills,   EYES:  Negative  blurry vision or double vision  CARDIOVASCULAR:  Negative for chest pain or palpitations  RESPIRATORY:  Negative for cough, wheezing, or SOB   GASTROINTESTINAL:  Negative for nausea, vomiting, diarrhea, constipation, + abdominal pain and distention  GENITOURINARY:  Negative frequency, urgency , dysuria or hematuria   NEUROLOGIC:  No headache, confusion, dizziness, lightheadedness  All other systems were reviewed and are negative    Physical Exam:  Vital Signs Last 24 Hrs  T(C): 36.4 (19 Oct 2017 13:51), Max: 36.8 (19 Oct 2017 05:23)  T(F): 97.6 (19 Oct 2017 13:51), Max: 98.2 (19 Oct 2017 05:23)  HR: 106 (19 Oct 2017 13:51) (89 - 125)  BP: 126/70 (19 Oct 2017 13:51) (126/70 - 160/93)  BP(mean): --  RR: 18 (19 Oct 2017 13:51) (16 - 18)  SpO2: 97% (19 Oct 2017 13:51) (97% - 99%)    General: Cachexia chronically ill no acute distress  Eyes: sclera anicteric, pupils equal and reactive to light  ENMT: buccal mucosa moist, pharynx not injected  Neck: supple, trachea midline  Lungs: clear, no wheeze/rhonchi  Cardiovascular: regular rate and rhythm, S1 S2  Abdomen: soft, Tender, distended +ascites  Neurological:  alert and oriented x3, Cranial Nerves II-XII grossly intact  Skin:no increased ecchymosis/petechiae/purpura  Lymph Nodes: no palpable cervical/supraclavicular lymph nodes enlargements  Extremities: Abbe LE edema wit erythema   +warm +blisters    Labs:                           11.0   6.5   )-----------( 174      ( 19 Oct 2017 07:41 )             33.0   10-19    133<L>  |  99  |  24<H>  ----------------------------<  68<L>  3.8   |  29  |  0.48<L>    Ca    7.8<L>      19 Oct 2017 07:41    TPro  6.7  /  Alb  1.7<L>  /  TBili  0.8  /  DBili  .40<H>  /  AST  50<H>  /  ALT  46  /  AlkPhos  297<H>  10-19    RECENT CULTURES:  PENDING    RADIOLOGY & ADDITIONAL STUDIES:  EXAM:  CHEST 1 VIEW                            PROCEDURE DATE:  10/18/2017          INTERPRETATION:  History: Cellulitis    Portable AP radiograph of the chest is performed. Comparison is made to   9/3/2017.    The cardiomediastinal silhouette is normal. there is no focal infiltrate   or pleural effusion. The osseous structures demonstrate degenerative   changes. The trachea is midline.    Impression: No active pulmonary disease. No change    Assessment :   60 y/o w/m h/o liver CA, alcoholic pancreatitis, Hep-C , HTN , IDDM admitted with bilateral  LE cellulitis sec edema  +Ascites Doubt SBP      Plan :   Cont Vancomycin Zosyn  Fu cultures  Elevate leg  Wound care following  Diurese per primary team    Continue with present regiment .  Approptiate use of antibiotics and adverse effects reviewed.      I have discussed the above plan of care with patient in detail. He expressed understanding of the treatment plan . Risks, benefits and alternatives discussed in detail.   I have asked if he has any questions or concerns and appropriately addressed them to the best of my ability .      > 45 minutes spent in direct patient care reviewing  the notes, lab data/ imaging , discussion with multidisciplinary team. All questions were addressed and answered to the best of my capacity .    Thank you for allowing me to participate in the care of your patient .      Scarlett Johnson MD  Infectious Disease  [phone]

## 2017-10-19 NOTE — CONSULT NOTE ADULT - SUBJECTIVE AND OBJECTIVE BOX
Chief Complaint:  Patient is a 61y old  Male who presents with a chief complaint of Leg Pain/Swelling  Abdominal Swelling (19 Oct 2017 12:50)      HPI: 62 y/o w/m h/o liver CA, alcoholic pancreatitis, Hep-C , HTN , IDDM presents to the ED with bilateral cellulitis of his lower extremities. Patient states that approximately 2 weeks ago he scraped his right knee and 4 days later he noticed swelling around his right knee. The swelling spread to his left leg and then he began developing blisters on the dorsal surfaces of his left foot and right shin. He describes his legs as being in pulsing pain. Earlier today he went to a podiatrist, who directed him to wound care, which advised him to come to the ED. He denies fever, chills, headache, nausea, vomiting, diarrhea. He admits shortness of breath when walking, relating the pain in his feet/    Allergies:  No Known Allergies      Medications:  dextrose 5%. 1000 milliLiter(s) IV Continuous <Continuous>  dextrose 50% Injectable 12.5 Gram(s) IV Push once  dextrose 50% Injectable 25 Gram(s) IV Push once  dextrose Gel 1 Dose(s) Oral once PRN  furosemide    Tablet 40 milliGRAM(s) Oral daily  glucagon  Injectable 1 milliGRAM(s) IntraMuscular once PRN  heparin  Injectable 5000 Unit(s) SubCutaneous every 12 hours  influenza   Vaccine 0.5 milliLiter(s) IntraMuscular once  insulin glargine Injectable (LANTUS) 34 Unit(s) SubCutaneous at bedtime  insulin lispro (HumaLOG) corrective regimen sliding scale   SubCutaneous three times a day before meals  morphine ER Tablet 60 milliGRAM(s) Oral three times a day  mupirocin 2% Ointment 1 Application(s) Topical daily  oxyCODONE    IR 30 milliGRAM(s) Oral every 6 hours  pantoprazole    Tablet 40 milliGRAM(s) Oral before breakfast  piperacillin/tazobactam IVPB. 3.375 Gram(s) IV Intermittent every 8 hours  vancomycin  IVPB 1000 milliGRAM(s) IV Intermittent every 12 hours  vancomycin  IVPB 1000 milliGRAM(s) IV Intermittent once      PMHX/PSHX:  Liver cancer  Weight loss, unintentional  Type 2 diabetes mellitus  Smoker  Chronic pain with drug dependence  HTN (hypertension)  Emphysema lung  Depression  Diabetic neuropathy  Diabetes mellitus  Chronic alcoholic pancreatitis  Hepatitis C  Pancreatitis  S/P inguinal hernia repair  S/P arthroscopy of right knee  S/P shoulder surgery  S/P arthroscopic surgery of left knee  History of cholecystectomy  No significant past surgical history  No significant past surgical history      Family history:  No pertinent family history in first degree relatives      Social History: denies recent etoh, tobacco, illicit drugs    ROS:     General:  No wt loss, fevers, chills, night sweats, fatigue,   Eyes:  Good vision, no reported pain  ENT:  No sore throat, pain, runny nose, dysphagia  CV:  No pain, palpitations, hypo/hypertension  Resp:  No dyspnea, cough, tachypnea, wheezing  GI: abd pain  :  No pain, bleeding, incontinence, nocturia  Muscle:  No pain, weakness  Neuro:  No weakness, tingling, memory problems  Psych:  No fatigue, insomnia, mood problems, depression  Endocrine:  No polyuria, polydipsia, cold/heat intolerance  Heme:  No petechiae, ecchymosis, easy bruisability  Skin:  le redness      PHYSICAL EXAM:   Vital Signs:  Vital Signs Last 24 Hrs  T(C): 36.4 (19 Oct 2017 13:51), Max: 36.8 (19 Oct 2017 05:23)  T(F): 97.6 (19 Oct 2017 13:51), Max: 98.2 (19 Oct 2017 05:23)  HR: 106 (19 Oct 2017 13:51) (89 - 125)  BP: 126/70 (19 Oct 2017 13:51) (126/70 - 160/93)  BP(mean): --  RR: 18 (19 Oct 2017 13:51) (14 - 18)  SpO2: 97% (19 Oct 2017 13:51) (97% - 99%)  Daily Height in cm: 182.88 (18 Oct 2017 17:03)    Daily Weight in k.7 (19 Oct 2017 15:17)    GENERAL:  Appears stated age, well-groomed, well-nourished, no distress  HEENT:  NC/AT,  conjunctivae clear and pink, no thyromegaly, nodules, adenopathy, no JVD, sclera -anicteric  CHEST:  Full & symmetric excursion, no increased effort, breath sounds clear  HEART:  Regular rhythm, S1, S2, no murmur/rub/S3/S4, no abdominal bruit, no edema  ABDOMEN:  Soft, non-tender, tense ascites, normoactive bowel sounds,  no masses ,no hepato-splenomegaly, no signs of chronic liver disease  EXTEREMITIES: bl  le edema, cellulitis  SKIN:  No rash/erythema/ecchymoses/petechiae/wounds/abscess/warm/dry  NEURO:  Alert, oriented, no asterixis, no tremor, no encephalopathy    LABS:                        11.0   6.5   )-----------( 174      ( 19 Oct 2017 07:41 )             33.0     10-19    133<L>  |  99  |  24<H>  ----------------------------<  68<L>  3.8   |  29  |  0.48<L>    Ca    7.8<L>      19 Oct 2017 07:41    TPro  6.7  /  Alb  1.7<L>  /  TBili  0.8  /  DBili  .40<H>  /  AST  50<H>  /  ALT  46  /  AlkPhos  297<H>  10-19    LIVER FUNCTIONS - ( 19 Oct 2017 07:41 )  Alb: 1.7 g/dL / Pro: 6.7 g/dL / ALK PHOS: 297 U/L / ALT: 46 U/L / AST: 50 U/L / GGT: x           PT/INR - ( 18 Oct 2017 18:51 )   PT: 11.4 sec;   INR: 1.04 ratio         PTT - ( 18 Oct 2017 18:51 )  PTT:32.4 sec        Imaging:

## 2017-10-19 NOTE — CHART NOTE - NSCHARTNOTEFT_GEN_A_CORE
Upon Nutritional Assessment by the Registered Dietitian your patient was determined to meet criteria / has evidence of the following diagnosis/diagnoses:          [ ]  Mild Protein Calorie Malnutrition        [ ]  Moderate Protein Calorie Malnutrition        [ x] Severe Protein Calorie Malnutrition        [ ] Unspecified Protein Calorie Malnutrition        [ ] Underweight / BMI <19        [ ] Morbid Obesity / BMI > 40      Findings as based on:  •  Comprehensive nutrition assessment and consultation  •  Calorie counts (nutrient intake analysis)  •  Food acceptance and intake status from observations by staff  •  Follow up  •  Patient education  •  Intervention secondary to interdisciplinary rounds  •   concerns  45# wt loss, severe muscle wasting    Treatment:    The following diet has been recommended:  consistent CHO diet with Glucerna supplements TID, hi calorie/protein snacks per pt preference    PROVIDER Section:     By signing this assessment you are acknowledging and agree with the diagnosis/diagnoses assigned by the Registered Dietitian    Comments:

## 2017-10-19 NOTE — PROGRESS NOTE ADULT - ASSESSMENT
60 y/o w/m h/o liver CA, alcoholic pancreatitis, Hep-C , HTN , IDDM admitted for cellulitis of bilateral lower extremities 60 y/o M h/o HCC, alcoholic pancreatitis, Hep-C , HTN , DM2, admitted for cellulitis of bilateral lower extremities and abdominal pain.

## 2017-10-19 NOTE — PROGRESS NOTE ADULT - SUBJECTIVE AND OBJECTIVE BOX
Patient is a 61y old  Male who presents with a chief complaint of Leg Pain/Swelling  Abdominal Swelling (19 Oct 2017 00:05)    INTERVAL HPI: 62 y/o w/m h/o liver CA, alcoholic pancreatitis, Hep-C , HTN , IDDM presents to the ED with bilateral cellulitis of his lower extremities. Patient states that approximately 2 weeks ago he scraped his right knee and 4 days later he noticed swelling around his right knee. The swelling spread to his left leg and then he began developing blisters on the dorsal surfaces of his left foot and right shin. He describes his legs as being in pulsing pain. Earlier today he went to a podiatrist, who directed him to wound care, which advised him to come to the ED. He denies fever, chills, headache, nausea, vomiting, diarrhea. He admits shortness of breath when walking, relating the pain in his feet.    OVERNIGHT EVENTS: Pt states he slept fine with only complaint of abdominal tenderness on palpation/abdominal distention that has been occurring for the past week. Pt also states legs continue to have pressure like pain B/L LE. Pt denies chest pain, SOB, fevers, chills, dysuria at this time.     MEDICATIONS  (STANDING):  dextrose 5%. 1000 milliLiter(s) (50 mL/Hr) IV Continuous <Continuous>  dextrose 50% Injectable 12.5 Gram(s) IV Push once  dextrose 50% Injectable 25 Gram(s) IV Push once  furosemide    Tablet 40 milliGRAM(s) Oral daily  heparin  Injectable 5000 Unit(s) SubCutaneous every 12 hours  influenza   Vaccine 0.5 milliLiter(s) IntraMuscular once  insulin glargine Injectable (LANTUS) 34 Unit(s) SubCutaneous at bedtime  insulin lispro (HumaLOG) corrective regimen sliding scale   SubCutaneous three times a day before meals  morphine ER Tablet 60 milliGRAM(s) Oral three times a day  oxyCODONE    IR 30 milliGRAM(s) Oral every 6 hours  pantoprazole    Tablet 40 milliGRAM(s) Oral before breakfast  piperacillin/tazobactam IVPB. 3.375 Gram(s) IV Intermittent every 8 hours  vancomycin  IVPB 1000 milliGRAM(s) IV Intermittent every 12 hours  vancomycin  IVPB 1000 milliGRAM(s) IV Intermittent once    MEDICATIONS  (PRN):  dextrose Gel 1 Dose(s) Oral once PRN Blood Glucose LESS THAN 70 milliGRAM(s)/deciliter  glucagon  Injectable 1 milliGRAM(s) IntraMuscular once PRN Glucose LESS THAN 70 milligrams/deciliter      Allergies  No Known Allergies    Intolerances  None    ROS  CONSTITUTIONAL: No weakness, fevers or chills  EYES/ENT: No visual changes;  No vertigo or throat pain   RESPIRATORY: No cough, wheezing, hemoptysis; No shortness of breath  CARDIOVASCULAR: No chest pain or palpitations  GASTROINTESTINAL: + abdominal pain. No nausea, vomiting, or hematemesis; No diarrhea or constipation. No melena or hematochezia.  GENITOURINARY: No dysuria, frequency or hematuria  NEUROLOGICAL: No numbness or weakness  SKIN: B/L LE edema, erythema, multiple blisters, open & healing wounds, pressure/pain in legs      Vital Signs Last 24 Hrs  T(C): 36.8 (19 Oct 2017 05:23), Max: 36.8 (19 Oct 2017 05:23)  T(F): 98.2 (19 Oct 2017 05:23), Max: 98.2 (19 Oct 2017 05:23)  HR: 89 (19 Oct 2017 05:23) (89 - 125)  BP: 133/69 (19 Oct 2017 05:23) (133/69 - 160/93)  BP(mean): --  RR: 18 (19 Oct 2017 05:23) (14 - 18)  SpO2: 97% (19 Oct 2017 05:23) (97% - 99%)    Physical exam  General: NAD, pt lying in bed comfortably, fluctuating level of consciousness  Neurology: A&Ox3, AGUILAR x 4  Respiratory: CTA B/L, no wheezes, rales, ronchi   CV: RRR, S1S2, no murmurs, rubs or gallops  Abdominal: firm, moderately distended, diffusely tender to palpation, no guarding or rebound, + BS  Extremities: B/L LE edema, erythema, multiple healing wounds throughout extremities, multiple blisters on legs       LABS:                        11.0   6.5   )-----------( 174      ( 19 Oct 2017 07:41 )             33.0     10-19    133<L>  |  99  |  24<H>  ----------------------------<  68<L>  3.8   |  29  |  0.48<L>    Ca    7.8<L>      19 Oct 2017 07:41    TPro  8.2  /  Alb  2.1<L>  /  TBili  0.8  /  DBili  x   /  AST  59<H>  /  ALT  56  /  AlkPhos  354<H>  10-18    PT/INR - ( 18 Oct 2017 18:51 )   PT: 11.4 sec;   INR: 1.04 ratio         PTT - ( 18 Oct 2017 18:51 )  PTT:32.4 sec      RADIOLOGY & ADDITIONAL TESTS:  EXAM:  CHEST 1 VIEW                        PROCEDURE DATE:  10/18/2017    INTERPRETATION:  History: Cellulitis  Portable AP radiograph of the chest is performed. Comparison is made to   9/3/2017.  The cardiomediastinal silhouette is normal. there is no focal infiltrate   or pleural effusion. The osseous structures demonstrate degenerative   changes. The trachea is midline.    Impression: No active pulmonary disease. No change

## 2017-10-19 NOTE — DIETITIAN INITIAL EVALUATION ADULT. - PROBLEM SELECTOR PLAN 1
admit to medical floor  continue zosyn, vancomycin  follow up blood cultures  Wound care consult  elevate legs  continue lasix, patient's home med for lower extremity edema

## 2017-10-19 NOTE — DISCHARGE NOTE ADULT - HOSPITAL COURSE
62 y/o w/m h/o liver CA, alcoholic pancreatitis, Hep-C , HTN , IDDM presents to the ED with bilateral cellulitis of his lower extremities. Patient states that approximately 2 weeks ago he scraped his right knee and 4 days later he noticed swelling around his right knee. The swelling spread to his left leg and then he began developing blisters on the dorsal surfaces of his left foot and right shin. He describes his legs as being in pulsing pain. Earlier today he went to a podiatrist, who directed him to wound care, which advised him to come to the ED. He denies fever, chills, headache, nausea, vomiting, diarrhea. He admits shortness of breath when walking, relating the pain in his feet. Admitted for b/l cellulitis. 62 y/o w/m h/o liver CA, alcoholic pancreatitis, Hep-C , HTN , IDDM presents to the ED with bilateral cellulitis of his lower extremities. Patient states that approximately 2 weeks ago he scraped his right knee and 4 days later he noticed swelling around his right knee. The swelling spread to his left leg and then he began developing blisters on the dorsal surfaces of his left foot and right shin. He describes his legs as being in pulsing pain. Earlier today he went to a podiatrist, who directed him to wound care, which advised him to come to the ED. He denies fever, chills, headache, nausea, vomiting, diarrhea. He admits shortness of breath when walking, relating the pain in his feet. Admitted for b/l cellulitis superimposed on chronic venous stasis ulcers.  ID (Alex), and wound care (Cande) consulted, given IV vanco and zosyn and Bactroban ointment.  Pt responded well to the treatment, decreased warmth, swelling, and tenderness of LE's.  Found to have profound abdominal distention related to ascites 2/2 cirrhosis from multifactorial liver pathology. GI (aric) consulted, thoracentesis performed, negative for SBP... 62 y/o w/m h/o liver CA, alcoholic pancreatitis, Hep-C , HTN , IDDM presents to the ED with bilateral cellulitis of his lower extremities. Patient stated that approximately 2 weeks ago he scraped his right knee and 4 days later he noticed swelling around his right knee. The swelling spread to his left leg and then he began developing blisters on the dorsal surfaces of his left foot and right shin. He described his legs as being in pulsing pain. On the day of admission, he went to a podiatrist, who directed him to wound care, which advised him to come to the ED. He denied fever, chills, headache, nausea, vomiting, diarrhea. He admitted to shortness of breath when walking, relating the pain in his feet. Admitted for b/l cellulitis superimposed on chronic venous stasis ulcers.      The patient was admitted to the [facility] for bilateral cellulitis superimposed on chronic venous stasis ulcers. The patient was seen by infectious disease, Dr. Johnson. The patient was given a course of IV vanco and Zosyn with improvement of symptoms. The patient will be discharged home on minocycline 100 mg BID x 7 days. LE doppler was performed. No DVT found. Wound care, Dr. Castellon was consulted for the bilateral lower extremity cellulitis. The patient was recommended to have Bactroban applied daily for the venous stasis ulcer.  Care was given per, wound care team. The patient was also found to have profound abdominal distention. GI, Dr. JOE Fung was consulted. A paracentesis was performed by IR, Dr. Flores. 60 cc of fluid was removed. Ascites 2/2 cirrhosis from multifactorial liver pathology. No SBP noted. The patient was also found to have a portal vein thrombus but is not a surgical candidate. Heme/onc, Dr Hon Johnson was consulted on the patient for hepatocellular carcinoma. The patient may by a candidate for immunotherapy outpatient. The patient was also found to have a  The patient was previously in hospice but removed himself. He declined a hospice evaluation. The patient was continued on his Lasix for essential hypertension, and his vitals were stable throughout his hospital course. The patient is stable for discharge home and will follow up with his PMD within one week. 60 y/o w/m h/o liver CA, alcoholic pancreatitis, Hep-C , HTN , IDDM presents to the ED with bilateral cellulitis of his lower extremities. Patient stated that approximately 2 weeks ago he scraped his right knee and 4 days later he noticed swelling around his right knee. The swelling spread to his left leg and then he began developing blisters on the dorsal surfaces of his left foot and right shin. He described his legs as being in pulsing pain. On the day of admission, he went to a podiatrist, who directed him to wound care, which advised him to come to the ED. He denied fever, chills, headache, nausea, vomiting, diarrhea. He admitted to shortness of breath when walking, relating the pain in his feet. Admitted for b/l cellulitis superimposed on chronic venous stasis ulcers.      The patient was admitted to the [facility] for bilateral cellulitis superimposed on chronic venous stasis ulcers. The patient was seen by infectious disease, Dr. Johnson. The patient was given a course of IV vanco and Zosyn with improvement of symptoms. The patient will be discharged home on minocycline 100 mg BID x 7 days. LE doppler was performed. No DVT found. Wound care, Dr. Castellon was consulted for the bilateral lower extremity cellulitis. The patient was recommended to have Bactroban applied daily for the venous stasis ulcer.  Care was given per, wound care team. The patient was also found to have profound abdominal distention. GI, Dr. JOE Fung was consulted. A paracentesis was performed by IR, Dr. Flores. 60 cc of fluid was removed. Ascites 2/2 cirrhosis from multifactorial liver pathology. No SBP noted. The patient was also found to have a portal vein thrombus but is not a surgical candidate. Heme/onc, Dr Hon Johnson was consulted on the patient for hepatocellular carcinoma. The patient may by a candidate for immunotherapy outpatient. The patient was also found to have a  The patient was previously in hospice but removed himself. He declined a hospice evaluation. The patient was continued on his Lasix for essential hypertension, and his vitals were stable throughout his hospital course. The patient is stable for discharge home and will follow up with his PMD within one week. Patient refused hospice evaluation and will f/u with his pain management and all his doctors.

## 2017-10-19 NOTE — DISCHARGE NOTE ADULT - CARE PROVIDER_API CALL
Heber Guajardo (DO), Internal Medicine  [address] [facility] [address] [place] [address]  Phone: [phone]  Fax: [phone]    Hon PONCHO Johnson (MD), Hematology; Internal Medicine; Medical Oncology  [address] [facility]  [address]  [place] [address]  Phone: [phone]  Fax: [phone]    Rg Castellon), Surgery  [address] [place] [address]  Phone: [phone]  Fax: [phone]    Bryan Fung), Internal Medicine  [address] [place] [address]  Phone: [phone]  Fax: [phone]

## 2017-10-19 NOTE — DISCHARGE NOTE ADULT - PLAN OF CARE
Resolution You were found to have an infection of the skin of your lower extremities. You were given IV antibiotics while in the hospital with improvement of symptoms. Please continue minocycline 100 mg, twice everyday x 7 days. Wound care, Dr. Castellon was consulted and Bactroban was applied dialy with no dressing for the venous stasis ulcer. Continue to keep your legs elevated. Please follow up with your PMD within one week of discharge. You have a history of hepatocellular carcinoma. You were seen by heme/onc, Dr. Hon Johnson. Please follow up with your outpatient oncologist for further management. You have a history of high blood pressure. Your blood pressure was well controlled on Lasix. Please continue to monitor your blood pressure at home. Follow up with your PMD for further management. You have a history of DM2. You were controlled on a Humalog sliding scale was well as Lantus. Please continue on your home regimen. Continue to monitor your fingersticks. Please follow up with your PMD. You were found to have abdominal distension. A paracentesis was performed by Dr. Flores (interventional radiology). 60 cc of fluid was removed. Ascities is likely 2/2 to hepatic pathology. Please follow up with your PMD. You were found to have abdominal distension. A paracentesis was performed by Dr. Flores (interventional radiology). 60 cc of fluid was removed. Ascites is likely 2/2 to hepatic pathology. Please follow up with your PMD.

## 2017-10-20 ENCOUNTER — RESULT REVIEW (OUTPATIENT)
Age: 62
End: 2017-10-20

## 2017-10-20 DIAGNOSIS — G47.00 INSOMNIA, UNSPECIFIED: ICD-10-CM

## 2017-10-20 LAB
ALBUMIN FLD-MCNC: <0.5 G/DL — SIGNIFICANT CHANGE UP
ANION GAP SERPL CALC-SCNC: 7 MMOL/L — SIGNIFICANT CHANGE UP (ref 5–17)
B PERT IGG+IGM PNL SER: ABNORMAL
BUN SERPL-MCNC: 21 MG/DL — SIGNIFICANT CHANGE UP (ref 7–23)
CALCIUM SERPL-MCNC: 7.5 MG/DL — LOW (ref 8.5–10.1)
CHLORIDE SERPL-SCNC: 97 MMOL/L — SIGNIFICANT CHANGE UP (ref 96–108)
CO2 SERPL-SCNC: 28 MMOL/L — SIGNIFICANT CHANGE UP (ref 22–31)
COLOR FLD: YELLOW — SIGNIFICANT CHANGE UP
CREAT SERPL-MCNC: 0.7 MG/DL — SIGNIFICANT CHANGE UP (ref 0.5–1.3)
FLUID INTAKE SUBSTANCE CLASS: SIGNIFICANT CHANGE UP
FLUID SEGMENTED GRANULOCYTES: 24 % — SIGNIFICANT CHANGE UP
FOLATE SERPL-MCNC: 16.6 NG/ML — SIGNIFICANT CHANGE UP (ref 4.8–24.2)
GLUCOSE SERPL-MCNC: 202 MG/DL — HIGH (ref 70–99)
GRAM STN FLD: SIGNIFICANT CHANGE UP
HCT VFR BLD CALC: 32.8 % — LOW (ref 39–50)
HGB BLD-MCNC: 10.7 G/DL — LOW (ref 13–17)
LYMPHOCYTES # FLD: 10 % — SIGNIFICANT CHANGE UP
MCHC RBC-ENTMCNC: 32.7 GM/DL — SIGNIFICANT CHANGE UP (ref 32–36)
MCHC RBC-ENTMCNC: 33.5 PG — SIGNIFICANT CHANGE UP (ref 27–34)
MCV RBC AUTO: 102.2 FL — HIGH (ref 80–100)
MESOTHL CELL # FLD: 18 % — SIGNIFICANT CHANGE UP
MONOS+MACROS # FLD: 40 % — SIGNIFICANT CHANGE UP
OTHER CELLS FLD MANUAL: 8 % — SIGNIFICANT CHANGE UP
PLATELET # BLD AUTO: 177 K/UL — SIGNIFICANT CHANGE UP (ref 150–400)
POTASSIUM SERPL-MCNC: 3.9 MMOL/L — SIGNIFICANT CHANGE UP (ref 3.5–5.3)
POTASSIUM SERPL-SCNC: 3.9 MMOL/L — SIGNIFICANT CHANGE UP (ref 3.5–5.3)
PROT FLD-MCNC: <1 G/DL — SIGNIFICANT CHANGE UP
RBC # BLD: 3.21 M/UL — LOW (ref 4.2–5.8)
RBC # FLD: 14.5 % — SIGNIFICANT CHANGE UP (ref 10.3–14.5)
RCV VOL RI: 2000 /UL — HIGH (ref 0–5)
SODIUM SERPL-SCNC: 132 MMOL/L — LOW (ref 135–145)
SPECIMEN SOURCE: SIGNIFICANT CHANGE UP
TOTAL NUCLEATED CELL COUNT, BODY FLUID: 473 /UL — HIGH (ref 0–5)
TUBE TYPE: SIGNIFICANT CHANGE UP
VANCOMYCIN TROUGH SERPL-MCNC: 8 UG/ML — LOW (ref 10–20)
VIT B12 SERPL-MCNC: 1301 PG/ML — HIGH (ref 243–894)
WBC # BLD: 6.2 K/UL — SIGNIFICANT CHANGE UP (ref 3.8–10.5)
WBC # FLD AUTO: 6.2 K/UL — SIGNIFICANT CHANGE UP (ref 3.8–10.5)

## 2017-10-20 PROCEDURE — 88305 TISSUE EXAM BY PATHOLOGIST: CPT | Mod: 26

## 2017-10-20 PROCEDURE — 49083 ABD PARACENTESIS W/IMAGING: CPT

## 2017-10-20 PROCEDURE — 88108 CYTOPATH CONCENTRATE TECH: CPT | Mod: 26

## 2017-10-20 PROCEDURE — 99233 SBSQ HOSP IP/OBS HIGH 50: CPT | Mod: GC

## 2017-10-20 RX ORDER — VANCOMYCIN HCL 1 G
1250 VIAL (EA) INTRAVENOUS EVERY 12 HOURS
Qty: 0 | Refills: 0 | Status: DISCONTINUED | OUTPATIENT
Start: 2017-10-21 | End: 2017-10-23

## 2017-10-20 RX ADMIN — OXYCODONE HYDROCHLORIDE 30 MILLIGRAM(S): 5 TABLET ORAL at 23:50

## 2017-10-20 RX ADMIN — OXYCODONE HYDROCHLORIDE 30 MILLIGRAM(S): 5 TABLET ORAL at 00:00

## 2017-10-20 RX ADMIN — OXYCODONE HYDROCHLORIDE 30 MILLIGRAM(S): 5 TABLET ORAL at 00:30

## 2017-10-20 RX ADMIN — OXYCODONE HYDROCHLORIDE 30 MILLIGRAM(S): 5 TABLET ORAL at 05:34

## 2017-10-20 RX ADMIN — Medication 40 MILLIGRAM(S): at 05:34

## 2017-10-20 RX ADMIN — INSULIN GLARGINE 34 UNIT(S): 100 INJECTION, SOLUTION SUBCUTANEOUS at 21:14

## 2017-10-20 RX ADMIN — MORPHINE SULFATE 60 MILLIGRAM(S): 50 CAPSULE, EXTENDED RELEASE ORAL at 14:21

## 2017-10-20 RX ADMIN — HEPARIN SODIUM 5000 UNIT(S): 5000 INJECTION INTRAVENOUS; SUBCUTANEOUS at 18:36

## 2017-10-20 RX ADMIN — MORPHINE SULFATE 60 MILLIGRAM(S): 50 CAPSULE, EXTENDED RELEASE ORAL at 05:38

## 2017-10-20 RX ADMIN — Medication 250 MILLIGRAM(S): at 05:34

## 2017-10-20 RX ADMIN — PIPERACILLIN AND TAZOBACTAM 25 GRAM(S): 4; .5 INJECTION, POWDER, LYOPHILIZED, FOR SOLUTION INTRAVENOUS at 18:37

## 2017-10-20 RX ADMIN — OXYCODONE HYDROCHLORIDE 30 MILLIGRAM(S): 5 TABLET ORAL at 23:21

## 2017-10-20 RX ADMIN — MUPIROCIN 1 APPLICATION(S): 20 OINTMENT TOPICAL at 13:24

## 2017-10-20 RX ADMIN — Medication 2 MILLIGRAM(S): at 23:21

## 2017-10-20 RX ADMIN — OXYCODONE HYDROCHLORIDE 30 MILLIGRAM(S): 5 TABLET ORAL at 12:44

## 2017-10-20 RX ADMIN — Medication 1: at 18:27

## 2017-10-20 RX ADMIN — Medication 250 MILLIGRAM(S): at 18:36

## 2017-10-20 RX ADMIN — Medication 2: at 12:42

## 2017-10-20 RX ADMIN — MORPHINE SULFATE 60 MILLIGRAM(S): 50 CAPSULE, EXTENDED RELEASE ORAL at 15:00

## 2017-10-20 RX ADMIN — PIPERACILLIN AND TAZOBACTAM 25 GRAM(S): 4; .5 INJECTION, POWDER, LYOPHILIZED, FOR SOLUTION INTRAVENOUS at 01:31

## 2017-10-20 RX ADMIN — Medication 2: at 08:36

## 2017-10-20 RX ADMIN — OXYCODONE HYDROCHLORIDE 30 MILLIGRAM(S): 5 TABLET ORAL at 18:32

## 2017-10-20 RX ADMIN — OXYCODONE HYDROCHLORIDE 30 MILLIGRAM(S): 5 TABLET ORAL at 13:33

## 2017-10-20 RX ADMIN — HEPARIN SODIUM 5000 UNIT(S): 5000 INJECTION INTRAVENOUS; SUBCUTANEOUS at 05:34

## 2017-10-20 RX ADMIN — PIPERACILLIN AND TAZOBACTAM 25 GRAM(S): 4; .5 INJECTION, POWDER, LYOPHILIZED, FOR SOLUTION INTRAVENOUS at 09:21

## 2017-10-20 RX ADMIN — MORPHINE SULFATE 60 MILLIGRAM(S): 50 CAPSULE, EXTENDED RELEASE ORAL at 21:11

## 2017-10-20 RX ADMIN — MORPHINE SULFATE 60 MILLIGRAM(S): 50 CAPSULE, EXTENDED RELEASE ORAL at 21:40

## 2017-10-20 RX ADMIN — PANTOPRAZOLE SODIUM 40 MILLIGRAM(S): 20 TABLET, DELAYED RELEASE ORAL at 05:38

## 2017-10-20 RX ADMIN — OXYCODONE HYDROCHLORIDE 30 MILLIGRAM(S): 5 TABLET ORAL at 19:15

## 2017-10-20 RX ADMIN — Medication 2 MILLIGRAM(S): at 01:31

## 2017-10-20 NOTE — CHART NOTE - NSCHARTNOTEFT_GEN_A_CORE
S: Called by RN that pt complaining of insomnia, takes Ativan at home would like to be started on it here. Patient seen and examined S: Called by RN that pt complaining of insomnia. Patient seen and examined at bedside. Reports taking Ativan 2mg at home for insomnia, after discharge plans on going to his psychiatrist to figure out the remainder of his meds.     Vital Signs Last 24 Hrs  T(C): 36.4 (19 Oct 2017 13:51), Max: 36.8 (19 Oct 2017 05:23)  T(F): 97.6 (19 Oct 2017 13:51), Max: 98.2 (19 Oct 2017 05:23)  HR: 106 (19 Oct 2017 13:51) (89 - 106)  BP: 126/70 (19 Oct 2017 13:51) (126/70 - 133/69)  RR: 18 (19 Oct 2017 13:51) (18 - 18)  SpO2: 97% (19 Oct 2017 13:51) (97% - 97%)    Physical Exam:  General: elderly male in NAD   Neurology: A&Ox4, nonfocal  Respiratory: CTA B/L, No W/R/R  CV: RRR, +S1/S2, no murmurs, rubs or gallops  Abdominal: Soft, NT, ND +BSx4      A/P: 60 y/o w/m h/o liver CA, alcoholic pancreatitis, Hep-C , HTN , IDDM admitted for cellulitis of bilateral lower extremities  -Ativan 2mg once  -Will have morning team determine home medications

## 2017-10-20 NOTE — CONSULT NOTE ADULT - SUBJECTIVE AND OBJECTIVE BOX
Patient is a 61y old  Male who presents with a chief complaint of Leg Pain/Swelling  Abdominal Swelling (19 Oct 2017 12:50)      HPI:  60 y/o w/m h/o liver CA, alcoholic pancreatitis, Hep-C , HTN , IDDM presents to the ED with bilateral cellulitis of his lower extremities. Patient states that approximately 2 weeks ago he scraped his right knee and 4 days later he noticed swelling around his right knee. The swelling spread to his left leg and then he began developing blisters on the dorsal surfaces of his left foot and right shin. He describes his legs as being in pulsing pain. Earlier today he went to a podiatrist, who directed him to wound care, which advised him to come to the ED. He denies fever, chills, headache, nausea, vomiting, diarrhea. He admits shortness of breath when walking, relating the pain in his feet/    In ED VS T max 98.1 /69  RR 16 O2 sat 99 on room air WBC within normal limits lactate 2.0 H/H 12.6/38.9 .1 Na 133 BUN 26 Alk phos 354 AST 59  Received vancomycin, zosyn  CXR No active pulmonary disease  12 lead EKG sinus tachycardia with premature atrial complexes, 106 bpm (18 Oct 2017 21:22)         PAST MEDICAL & SURGICAL HISTORY:  Liver cancer  Weight loss, unintentional: 45 lbs in 1 yr  Type 2 diabetes mellitus: IDDM  Smoker  Chronic pain with drug dependence: On suboxone now  HTN (hypertension): no medications  Emphysema lung  Depression  Diabetic neuropathy  Chronic alcoholic pancreatitis  Hepatitis C  S/P inguinal hernia repair: left  S/P arthroscopy of right knee  S/P shoulder surgery: bilateral  History of cholecystectomy      SOCIAL HISTORY:  smoking xxxx  EtOH xxxx  illicit drugs xxx  occupation xxx  marital status: xxxx    FAMILY HISTORY:  No pertinent family history in first degree relatives      MEDICATIONS  (STANDING):  dextrose 5%. 1000 milliLiter(s) (50 mL/Hr) IV Continuous <Continuous>  dextrose 50% Injectable 12.5 Gram(s) IV Push once  dextrose 50% Injectable 25 Gram(s) IV Push once  furosemide    Tablet 40 milliGRAM(s) Oral daily  heparin  Injectable 5000 Unit(s) SubCutaneous every 12 hours  influenza   Vaccine 0.5 milliLiter(s) IntraMuscular once  insulin glargine Injectable (LANTUS) 34 Unit(s) SubCutaneous at bedtime  insulin lispro (HumaLOG) corrective regimen sliding scale   SubCutaneous three times a day before meals  morphine ER Tablet 60 milliGRAM(s) Oral three times a day  mupirocin 2% Ointment 1 Application(s) Topical daily  oxyCODONE    IR 30 milliGRAM(s) Oral every 6 hours  pantoprazole    Tablet 40 milliGRAM(s) Oral before breakfast  piperacillin/tazobactam IVPB. 3.375 Gram(s) IV Intermittent every 8 hours    MEDICATIONS  (PRN):  dextrose Gel 1 Dose(s) Oral once PRN Blood Glucose LESS THAN 70 milliGRAM(s)/deciliter  glucagon  Injectable 1 milliGRAM(s) IntraMuscular once PRN Glucose LESS THAN 70 milligrams/deciliter  LORazepam     Tablet 2 milliGRAM(s) Oral at bedtime PRN Insomnia      Allergies    No Known Allergies    Intolerances        ROS: Negative except for:  REVIEW OF SYSTEMS:  *****  CONSTITUTIONAL: normal / lethargic  EYES: No eye pain, visual disturbances, or discharge  ENMT:  no discharge  NECK: No pain or stiffness  BREASTS: No pain, masses, or nipple discharge  RESPIRATORY: No cough, wheezing, chills or hemoptysis; No shortness of breath  CARDIOVASCULAR: No chest pain, palpitations, dizziness, or leg swelling  GASTROINTESTINAL: No abdominal or epigastric pain. No nausea, vomiting, or hematemesis; No diarrhea or constipation. No melena or hematochezia.  GENITOURINARY: No dysuria, frequency, hematuria, or incontinence  NEUROLOGICAL: No headaches, memory loss, loss of strength, numbness, or tremors  SKIN: No itching, burning, rashes, or lesions   LYMPH NODES: No enlarged glands  ENDOCRINE: No heat or cold intolerance; No hair loss  MUSCULOSKELETAL: No joint pain or swelling; No muscle, back, or extremity pain  PSYCHIATRIC: No depression, anxiety, mood swings, or difficulty sleeping  HEME/LYMPH: No easy bruising, or bleeding gums      Vital Signs Last 24 Hrs  T(C): 36.4 (20 Oct 2017 19:58), Max: 36.8 (20 Oct 2017 04:17)  T(F): 97.5 (20 Oct 2017 19:58), Max: 98.3 (20 Oct 2017 04:17)  HR: 95 (20 Oct 2017 19:58) (95 - 102)  BP: 125/76 (20 Oct 2017 19:58) (109/64 - 125/76)  BP(mean): --  RR: 17 (20 Oct 2017 19:58) (17 - 18)  SpO2: 97% (20 Oct 2017 19:58) (95% - 97%)  PHYSICAL EXAM  *****  General: adult in NAD  HEENT: clear oropharynx, anicteric sclera, pink conjunctivae  Neck: supple  CV: normal S1S2 with no murmur rubs or gallops  Lungs: clear to auscultation, no wheezes, no rales  Abdomen: soft non-tender non-distended, no hepato/splenomegaly  Ext: no clubbing cyoanosis or edema  Skin: no rashes and no petichiae  Neuro: alert and oriented X3 no focal deficits      LABS:    CBC Full  -  ( 20 Oct 2017 07:07 )  WBC Count : 6.2 K/uL  Hemoglobin : 10.7 g/dL  Hematocrit : 32.8 %  Platelet Count - Automated : 177 K/uL  Mean Cell Volume : 102.2 fl  Mean Cell Hemoglobin : 33.5 pg  Mean Cell Hemoglobin Concentration : 32.7 gm/dL  Auto Neutrophil # : x  Auto Lymphocyte # : x  Auto Monocyte # : x  Auto Eosinophil # : x  Auto Basophil # : x  Auto Neutrophil % : x  Auto Lymphocyte % : x  Auto Monocyte % : x  Auto Eosinophil % : x  Auto Basophil % : x    10-20    132<L>  |  97  |  21  ----------------------------<  202<H>  3.9   |  28  |  0.70    Ca    7.5<L>      20 Oct 2017 07:07    TPro  6.7  /  Alb  1.7<L>  /  TBili  0.8  /  DBili  .40<H>  /  AST  50<H>  /  ALT  46  /  AlkPhos  297<H>  10-19          BLOOD SMEAR INTERPRETATION:   *****      RADIOLOGY & ADDITIONAL STUDIES:  *****      ASSESSMENT AND RECOMMENDATIONS: Patient is a 61y old  Male who presents with a chief complaint of Leg Pain/Swelling  Abdominal Swelling (19 Oct 2017 12:50)      HPI:  60 y/o w/m h/o liver CA, alcoholic pancreatitis, Hep-C , HTN , IDDM presents to the ED with bilateral cellulitis of his lower extremities. Patient states that approximately 2 weeks ago he scraped his right knee and 4 days later he noticed swelling around his right knee. The swelling spread to his left leg and then he began developing blisters on the dorsal surfaces of his left foot and right shin. He describes his legs as being in pulsing pain. Earlier today he went to a podiatrist, who directed him to wound care, which advised him to come to the ED. He denies fever, chills, headache, nausea, vomiting, diarrhea. He admits shortness of breath when walking, relating the pain in his feet/    In ED VS T max 98.1 /69  RR 16 O2 sat 99 on room air WBC within normal limits lactate 2.0 H/H 12.6/38.9 .1 Na 133 BUN 26 Alk phos 354 AST 59  Received vancomycin, zosyn  CXR No active pulmonary disease  12 lead EKG sinus tachycardia with premature atrial complexes, 106 bpm (18 Oct 2017 21:22)     Heme-Onc asked to consult on pt. Followed prior in office by Dr payne.   Recently DC from hospice 3wk prior.   60 y/o an active smoker man with h/o insulin dependent diabetes, ETOH abuse, hep C who presented with liver mass s/p radioablation with Y90 R hepatic artery 3/27/2017. Last CT scan suggestive of progression.     HCC no tissue diagnosis available , however radiologically and by AFP consistent with HCC diagnosed in 2017   h/o ETOH abuse, hep C, current every day smoker. ECOG1 not a transplant candidate       patient developed weight loss despite normal appetite .  CT 10/01/13 and 13 enlarged fatty liver with small perihepatic edema.   CT C/A/P 2017 enlarged liver   MRI showed 7.7 x 5.5 x  9 cm lesion mass in segment 5,8 , thrombosis anterial portal vein   Normal bone scan  AFP 1574  s/p XRT Dr Lomax   MRI 17 showed 5.9 by 5.2 mass, cirrhosis   Had Radioablation Y90 R hepatic artery 3/27/2017 and segment 4 on 17  CT A/P 17 [compared with CT 17]: showed cirrhosis. 6.6 x 2.2 cm  mass anterior segment of the right lobe + adjacent tumor thrombus in R portal vein, small perihepatic ascites , 1.8 x 3.0 cm periportal LN( previously 3.8 by 1.5)  .5 in May 2017   on 17  MRI 17 : Liver mass 5.9 x 6.4 cm segment 5/8 unchanged. Mass segment 2  is 1.7 x 1.6 cm -->2 x 1.7 cm.        On 17 Had discussed that Tx may potentially delay the progression with Nexavar 400 mg BID .  Patient decided to proceed with Nexavar in case if insurance covers the cost ( cannot afford high copays).  On 17, pt decided to stop tx, self referred to hospice care.         PAST MEDICAL & SURGICAL HISTORY:  Liver cancer  Weight loss, unintentional: 45 lbs in 1 yr  Type 2 diabetes mellitus: IDDM  Smoker  Chronic pain with drug dependence: On suboxone now  HTN (hypertension): no medications  Emphysema lung  Depression  Diabetic neuropathy  Chronic alcoholic pancreatitis  Hepatitis C  S/P inguinal hernia repair: left  S/P arthroscopy of right knee  S/P shoulder surgery: bilateral  History of cholecystectomy      SOCIAL HISTORY:  smokinPPD smoker, now 1/4 PPD trying to quit  EtOH: ex-heavy EtoH, on and off sober since   illicit drugs: all sorts of drugs, denies IVDU  occupation: retired   marital status: , 2 children  PMD Dr Heber Guajardo.     FAMILY HISTORY:  No pertinent family history in first degree relatives      MEDICATIONS  (STANDING):  dextrose 5%. 1000 milliLiter(s) (50 mL/Hr) IV Continuous <Continuous>  dextrose 50% Injectable 12.5 Gram(s) IV Push once  dextrose 50% Injectable 25 Gram(s) IV Push once  furosemide    Tablet 40 milliGRAM(s) Oral daily  heparin  Injectable 5000 Unit(s) SubCutaneous every 12 hours  influenza   Vaccine 0.5 milliLiter(s) IntraMuscular once  insulin glargine Injectable (LANTUS) 34 Unit(s) SubCutaneous at bedtime  insulin lispro (HumaLOG) corrective regimen sliding scale   SubCutaneous three times a day before meals  morphine ER Tablet 60 milliGRAM(s) Oral three times a day  mupirocin 2% Ointment 1 Application(s) Topical daily  oxyCODONE    IR 30 milliGRAM(s) Oral every 6 hours  pantoprazole    Tablet 40 milliGRAM(s) Oral before breakfast  piperacillin/tazobactam IVPB. 3.375 Gram(s) IV Intermittent every 8 hours    MEDICATIONS  (PRN):  dextrose Gel 1 Dose(s) Oral once PRN Blood Glucose LESS THAN 70 milliGRAM(s)/deciliter  glucagon  Injectable 1 milliGRAM(s) IntraMuscular once PRN Glucose LESS THAN 70 milligrams/deciliter  LORazepam     Tablet 2 milliGRAM(s) Oral at bedtime PRN Insomnia      Allergies     No Known Allergies  Intolerances    ROS  General:  No wt loss, fevers, chills, night sweats, fatigue,   Eyes:  Good vision, no reported pain  ENT:  No sore throat, pain, runny nose, dysphagia  CV:  No pain, palpitations, hypo/hypertension  Resp:  No dyspnea, cough, tachypnea, wheezing  GI: abd pain/disention  :  No pain, bleeding, incontinence, nocturia  Muscle:  No pain, weakness  Neuro:  No weakness, tingling, memory problems  Psych:  No fatigue, insomnia, mood problems, depression  Endocrine:  No polyuria, polydipsia, cold/heat intolerance  Heme:  No petechiae, ecchymosis, easy bruisability  Skin:  No rash, tattoos, scars, edema    Vital Signs Last 24 Hrs  T(C): 36.4 (20 Oct 2017 19:58), Max: 36.8 (20 Oct 2017 04:17)  T(F): 97.5 (20 Oct 2017 19:58), Max: 98.3 (20 Oct 2017 04:17)  HR: 95 (20 Oct 2017 19:58) (95 - 102)  BP: 125/76 (20 Oct 2017 19:58) (109/64 - 125/76)  BP(mean): --  RR: 17 (20 Oct 2017 19:58) (17 - 18)  SpO2: 97% (20 Oct 2017 19:58) (95% - 97%)    PHYSICAL EXAM    General: adult in NAD, chronically ill looking  HEENT: clear oropharynx, anicteric sclera, pink conjunctivae  Neck: supple  CV: normal S1S2 with no murmur rubs or gallops  Lungs: clear to auscultation, no wheezes, no rales  Abdomen: soft non-tender, +-distended, no hepato/splenomegaly  Ext: no clubbing cyanosis +BL edema. RLE erythema.   Skin: no rashes and no petechiae  Neuro: alert and oriented X3,  no focal deficits      LABS:    Vitamin B12, Serum in AM (10.20.17 @ 08:38)    Vitamin B12, Serum: 1301 pg/mL    Folate, Serum in AM (10.20.17 @ 08:38)    Folate, Serum: 16.6 ng/mL          CBC Full  -  ( 20 Oct 2017 07:07 )  WBC Count : 6.2 K/uL  Hemoglobin : 10.7 g/dL  Hematocrit : 32.8 %  Platelet Count - Automated : 177 K/uL  Mean Cell Volume : 102.2 fl  Mean Cell Hemoglobin : 33.5 pg  Mean Cell Hemoglobin Concentration : 32.7 gm/dL  Auto Neutrophil # : x  Auto Lymphocyte # : x  Auto Monocyte # : x  Auto Eosinophil # : x  Auto Basophil # : x  Auto Neutrophil % : x  Auto Lymphocyte % : x  Auto Monocyte % : x  Auto Eosinophil % : x  Auto Basophil % : x    10-20  132<L>  |  97  |  21  ----------------------------<  202<H>  3.9   |  28  |  0.70  Ca    7.5<L>      20 Oct 2017 07:07  TPro  6.7  /  Alb  1.7<L>  /  TBili  0.8  /  DBili  .40<H>  /  AST  50<H>  /  ALT  46  /  AlkPhos  297<H>  10-19          BLOOD SMEAR INTERPRETATION:   RBC: normocytic and macrocytic RBC, no NRBC, no schistocytes  WBC normal appearing no blasts, no eosinophils  plts: decreased.       RADIOLOGY & ADDITIONAL STUDIES:  < from: Xray Chest 1 View AP/PA (10.18.17 @ 18:15) >  EXAM:  CHEST 1 VIEW                      PROCEDURE DATE:  10/18/2017    INTERPRETATION:  History: Cellulitis    Portable AP radiograph of the chest is performed. Comparison is made to   9/3/2017.    The cardiomediastinal silhouette is normal. there is no focal infiltrate   or pleural effusion. The osseous structures demonstrate degenerative   changes. The trachea is midline.    Impression: No active pulmonary disease. No change    JUSTYNA QUIÑONES   This document has been electronically signed. Oct 18 2017  6:45PM  < end of copied text >        < from: CT Head No Cont (. @ 04:41) >  EXAM:  CT BRAIN                        PROCEDURE DATE:  2017    INTERPRETATION:  HISTORY: Status post unwitnessed fall. Altered mental   status. Liver cancer.    Technique: Noncontrast CT of the head was performed.    Multiple contiguous axial images were acquired from the skull base to the vertex without the administration of intravenous contrast. Coronal and   sagittal reformations were made.    COMPARISON: CT head dated 2017, 9/3/2017.    FINDINGS:  There has been nointerval change.    Ventricles and sulci are within normal limits for the patient's stated age. There are no new areas of attenuation abnormality within the brain.   There is no intraparenchymal hematoma, mass effect or midline shift. No abnormalextra-axial fluid collections are present. There is no evidence   of acute transcortical territorial infarction.    The calvarium is intact. The visualized intraorbital compartments, paranasal sinuses and tympanomastoid cavities appear free of acute disease.    IMPRESSION:  No acute intracranial hemorrhage.  No interval change.    TOO ROBERTS M.D., ATTENDING RADIOLOGIST  This document has been electronically signed. Sep 27 2017  9:24AM       < end of copied text > Patient is a 61y old  Male who presents with a chief complaint of Leg Pain/Swelling  Abdominal Swelling (19 Oct 2017 12:50)      HPI:  62 y/o w/m h/o liver CA, alcoholic pancreatitis, Hep-C , HTN , IDDM presents to the ED with bilateral cellulitis of his lower extremities. Patient states that approximately 2 weeks ago he scraped his right knee and 4 days later he noticed swelling around his right knee. The swelling spread to his left leg and then he began developing blisters on the dorsal surfaces of his left foot and right shin. He describes his legs as being in pulsing pain. Earlier today he went to a podiatrist, who directed him to wound care, which advised him to come to the ED. He denies fever, chills, headache, nausea, vomiting, diarrhea. He admits shortness of breath when walking, relating the pain in his feet/    In ED VS T max 98.1 /69  RR 16 O2 sat 99 on room air WBC within normal limits lactate 2.0 H/H 12.6/38.9 .1 Na 133 BUN 26 Alk phos 354 AST 59  Received vancomycin, zosyn  CXR No active pulmonary disease  12 lead EKG sinus tachycardia with premature atrial complexes, 106 bpm (18 Oct 2017 21:22)     Heme-Onc asked to consult on pt. Followed prior in office by Dr payne.   Recently DC from hospice 3wk prior.   62 y/o an active smoker man with h/o insulin dependent diabetes, ETOH abuse, hep C who presented with liver mass s/p radioablation with Y90 R hepatic artery 3/27/2017. Last CT scan suggestive of progression.     HCC no tissue diagnosis available , however radiologically and by AFP consistent with HCC diagnosed in 2017   h/o ETOH abuse, hep C, current every day smoker. ECOG1 not a transplant candidate       patient developed weight loss despite normal appetite .  CT 10/01/13 and 13 enlarged fatty liver with small perihepatic edema.   CT C/A/P 2017 enlarged liver   MRI showed 7.7 x 5.5 x  9 cm lesion mass in segment 5,8 , thrombosis anterial portal vein   Normal bone scan  AFP 1574  s/p XRT Dr Lomax   MRI 17 showed 5.9 by 5.2 mass, cirrhosis   Had Radioablation Y90 R hepatic artery 3/27/2017 and segment 4 on 17  CT A/P 17 [compared with CT 17]: showed cirrhosis. 6.6 x 2.2 cm  mass anterior segment of the right lobe + adjacent tumor thrombus in R portal vein, small perihepatic ascites , 1.8 x 3.0 cm periportal LN( previously 3.8 by 1.5)  .5 in May 2017   on 17  MRI 17 : Liver mass 5.9 x 6.4 cm segment 5/8 unchanged. Mass segment 2  is 1.7 x 1.6 cm -->2 x 1.7 cm.        On 17 Had discussed that Tx may potentially delay the progression with Nexavar 400 mg BID .  Patient decided to proceed with Nexavar in case if insurance covers the cost ( cannot afford high copays).  On 17, pt decided to stop tx, self referred to hospice care.   never had colonoscopy.       PAST MEDICAL & SURGICAL HISTORY:  Liver cancer  Weight loss, unintentional: 45 lbs in 1 yr  Type 2 diabetes mellitus: IDDM  Smoker  Chronic pain with drug dependence: On suboxone now  HTN (hypertension): no medications  Emphysema lung  Depression  Diabetic neuropathy  Chronic alcoholic pancreatitis  Hepatitis C  S/P inguinal hernia repair: left  S/P arthroscopy of right knee  S/P shoulder surgery: bilateral  History of cholecystectomy      SOCIAL HISTORY:  smokinPPD smoker, now 1/4 PPD trying to quit  EtOH: ex-heavy EtoH, on and off sober since   illicit drugs: all sorts of drugs, denies IVDU  occupation: retired   marital status: , 2 children: 35yo Son in [employer], and 34yo dtr.  PMD Dr Heber Guajardo.   Sister Nevaeh Garrett is HCP    FAMILY HISTORY:  No pertinent family history in first degree relatives  Father alive 85yo  mother  85yo CVA.  3 sisters  2 brothers  1 paternal half brother  1 paternal half sister        MEDICATIONS  (STANDING):  dextrose 5%. 1000 milliLiter(s) (50 mL/Hr) IV Continuous <Continuous>  dextrose 50% Injectable 12.5 Gram(s) IV Push once  dextrose 50% Injectable 25 Gram(s) IV Push once  furosemide    Tablet 40 milliGRAM(s) Oral daily  heparin  Injectable 5000 Unit(s) SubCutaneous every 12 hours  influenza   Vaccine 0.5 milliLiter(s) IntraMuscular once  insulin glargine Injectable (LANTUS) 34 Unit(s) SubCutaneous at bedtime  insulin lispro (HumaLOG) corrective regimen sliding scale   SubCutaneous three times a day before meals  morphine ER Tablet 60 milliGRAM(s) Oral three times a day  mupirocin 2% Ointment 1 Application(s) Topical daily  oxyCODONE    IR 30 milliGRAM(s) Oral every 6 hours  pantoprazole    Tablet 40 milliGRAM(s) Oral before breakfast  piperacillin/tazobactam IVPB. 3.375 Gram(s) IV Intermittent every 8 hours    MEDICATIONS  (PRN):  dextrose Gel 1 Dose(s) Oral once PRN Blood Glucose LESS THAN 70 milliGRAM(s)/deciliter  glucagon  Injectable 1 milliGRAM(s) IntraMuscular once PRN Glucose LESS THAN 70 milligrams/deciliter  LORazepam     Tablet 2 milliGRAM(s) Oral at bedtime PRN Insomnia      Allergies     No Known Allergies  Intolerances    ROS  General:  No wt loss, fevers, chills, night sweats, fatigue,   Eyes:  Good vision, no reported pain  ENT:  No sore throat, pain, runny nose, dysphagia  CV:  No pain, palpitations, hypo/hypertension  Resp:  No dyspnea, cough, tachypnea, wheezing  GI: abd pain/disention  :  No pain, bleeding, incontinence, nocturia  Muscle:  No pain, weakness  Neuro:  No weakness, tingling, memory problems  Psych:  No fatigue, insomnia, mood problems, depression  Endocrine:  No polyuria, polydipsia, cold/heat intolerance  Heme:  No petechiae, ecchymosis, easy bruisability  Skin:  No rash, tattoos, scars, edema    Vital Signs Last 24 Hrs  T(C): 36.4 (20 Oct 2017 19:58), Max: 36.8 (20 Oct 2017 04:17)  T(F): 97.5 (20 Oct 2017 19:58), Max: 98.3 (20 Oct 2017 04:17)  HR: 95 (20 Oct 2017 19:58) (95 - 102)  BP: 125/76 (20 Oct 2017 19:58) (109/64 - 125/76)  BP(mean): --  RR: 17 (20 Oct 2017 19:58) (17 - 18)  SpO2: 97% (20 Oct 2017 19:58) (95% - 97%)    PHYSICAL EXAM    General: adult in NAD, chronically ill looking  HEENT: clear oropharynx, anicteric sclera, pink conjunctivae  Neck: supple  CV: normal S1S2 with no murmur rubs or gallops  Lungs: clear to auscultation, no wheezes, no rales  Abdomen: soft non-tender, +-distended, no hepato/splenomegaly  Ext: no clubbing cyanosis +BL edema. RLE erythema.   Skin: no rashes and no petechiae  Neuro: alert and oriented X3,  no focal deficits      LABS:    Vitamin B12, Serum in AM (10.20.17 @ 08:38)    Vitamin B12, Serum: 1301 pg/mL    Folate, Serum in AM (10.20.17 @ 08:38)    Folate, Serum: 16.6 ng/mL          CBC Full  -  ( 20 Oct 2017 07:07 )  WBC Count : 6.2 K/uL  Hemoglobin : 10.7 g/dL  Hematocrit : 32.8 %  Platelet Count - Automated : 177 K/uL  Mean Cell Volume : 102.2 fl  Mean Cell Hemoglobin : 33.5 pg  Mean Cell Hemoglobin Concentration : 32.7 gm/dL  Auto Neutrophil # : x  Auto Lymphocyte # : x  Auto Monocyte # : x  Auto Eosinophil # : x  Auto Basophil # : x  Auto Neutrophil % : x  Auto Lymphocyte % : x  Auto Monocyte % : x  Auto Eosinophil % : x  Auto Basophil % : x    10-20  132<L>  |  97  |  21  ----------------------------<  202<H>  3.9   |  28  |  0.70  Ca    7.5<L>      20 Oct 2017 07:07  TPro  6.7  /  Alb  1.7<L>  /  TBili  0.8  /  DBili  .40<H>  /  AST  50<H>  /  ALT  46  /  AlkPhos  297<H>  10-19          BLOOD SMEAR INTERPRETATION:   RBC: normocytic and macrocytic RBC, no NRBC, no schistocytes  WBC normal appearing no blasts, no eosinophils  plts: decreased.       RADIOLOGY & ADDITIONAL STUDIES:  < from: Xray Chest 1 View AP/PA (10.18.17 @ 18:15) >  EXAM:  CHEST 1 VIEW                      PROCEDURE DATE:  10/18/2017    INTERPRETATION:  History: Cellulitis    Portable AP radiograph of the chest is performed. Comparison is made to   9/3/2017.    The cardiomediastinal silhouette is normal. there is no focal infiltrate   or pleural effusion. The osseous structures demonstrate degenerative   changes. The trachea is midline.    Impression: No active pulmonary disease. No change    JUSTYNA QUIÑONES   This document has been electronically signed. Oct 18 2017  6:45PM  < end of copied text >        < from: CT Head No Cont (17 @ 04:41) >  EXAM:  CT BRAIN                        PROCEDURE DATE:  2017    INTERPRETATION:  HISTORY: Status post unwitnessed fall. Altered mental   status. Liver cancer.    Technique: Noncontrast CT of the head was performed.    Multiple contiguous axial images were acquired from the skull base to the vertex without the administration of intravenous contrast. Coronal and   sagittal reformations were made.    COMPARISON: CT head dated 2017, 9/3/2017.    FINDINGS:  There has been nointerval change.    Ventricles and sulci are within normal limits for the patient's stated age. There are no new areas of attenuation abnormality within the brain.   There is no intraparenchymal hematoma, mass effect or midline shift. No abnormalextra-axial fluid collections are present. There is no evidence   of acute transcortical territorial infarction.    The calvarium is intact. The visualized intraorbital compartments, paranasal sinuses and tympanomastoid cavities appear free of acute disease.    IMPRESSION:  No acute intracranial hemorrhage.  No interval change.    TOO ROBERTS M.D., ATTENDING RADIOLOGIST  This document has been electronically signed. Sep 27 2017  9:24AM       < end of copied text >

## 2017-10-20 NOTE — CONSULT NOTE ADULT - PROBLEM SELECTOR PROBLEM 2
HCC (hepatocellular carcinoma)
Carcinoma of liver
Cellulitis of lower extremity, unspecified laterality

## 2017-10-20 NOTE — PROGRESS NOTE ADULT - SUBJECTIVE AND OBJECTIVE BOX
ELYSSA BROOKE is a 61yMale , patient examined and chart reviewed.    INTERVAL HPI/ OVERNIGHT EVENTS:   Feeling better.  Had paracentesis done today - 60 cc obtained.    PAST MEDICAL & SURGICAL HISTORY:  Liver cancer  Weight loss, unintentional: 45 lbs in 1 yr  Type 2 diabetes mellitus: IDDM  Smoker  Chronic pain with drug dependence: On suboxone now  HTN (hypertension): no medications  Emphysema lung  Depression  Diabetic neuropathy  Chronic alcoholic pancreatitis  Hepatitis C  S/P inguinal hernia repair: left  S/P arthroscopy of right knee  S/P shoulder surgery: bilateral  History of cholecystectomy      For details regarding the patient's social history, family history, and other miscellaneous elements, please refer the initial infectious diseases consultation and/or the admitting history and physical examination for this admission.      ROS:  CONSTITUTIONAL:  Negative fever or chills  EYES:  Negative  blurry vision or double vision  CARDIOVASCULAR:  Negative for chest pain or palpitations  RESPIRATORY:  Negative for cough, wheezing, or SOB   GASTROINTESTINAL:  Negative for nausea, vomiting, diarrhea, constipation, or abdominal pain  GENITOURINARY:  Negative frequency, urgency or dysuria  NEUROLOGIC:  No headache, confusion, dizziness, lightheadedness  All other systems were reviewed and are negative     Current inpatient medications :    ANTIBIOTICS/RELEVANT:  piperacillin/tazobactam IVPB. 3.375 Gram(s) IV Intermittent every 8 hours  vancomycin  IVPB 1000 milliGRAM(s) IV Intermittent every 12 hours    dextrose 5%. 1000 milliLiter(s) IV Continuous <Continuous>  dextrose 50% Injectable 12.5 Gram(s) IV Push once  dextrose 50% Injectable 25 Gram(s) IV Push once  dextrose Gel 1 Dose(s) Oral once PRN  furosemide    Tablet 40 milliGRAM(s) Oral daily  glucagon  Injectable 1 milliGRAM(s) IntraMuscular once PRN  heparin  Injectable 5000 Unit(s) SubCutaneous every 12 hours  insulin glargine Injectable (LANTUS) 34 Unit(s) SubCutaneous at bedtime  insulin lispro (HumaLOG) corrective regimen sliding scale   SubCutaneous three times a day before meals  LORazepam     Tablet 2 milliGRAM(s) Oral at bedtime PRN  morphine ER Tablet 60 milliGRAM(s) Oral three times a day  mupirocin 2% Ointment 1 Application(s) Topical daily  oxyCODONE    IR 30 milliGRAM(s) Oral every 6 hours  pantoprazole    Tablet 40 milliGRAM(s) Oral before breakfast      Objective:    10-19 @ 07:01  -  10-20 @ 07:00  --------------------------------------------------------  IN: 1010 mL / OUT: 0 mL / NET: 1010 mL    10-20 @ 07:01  -  10-20 @ 18:23  --------------------------------------------------------  IN: 700 mL / OUT: 0 mL / NET: 700 mL      T(C): 36.5 (10-20-17 @ 13:42), Max: 36.8 (10-20-17 @ 04:17)  HR: 99 (10-20-17 @ 13:42) (99 - 102)  BP: 109/64 (10-20-17 @ 13:42) (109/64 - 120/75)  RR: 18 (10-20-17 @ 13:42) (18 - 18)  SpO2: 96% (10-20-17 @ 13:42) (95% - 96%)  Wt(kg): --      Physical Exam:  General: well developed well nourished, in no acute distress  Eyes: sclera anicteric, pupils equal and reactive to light  ENMT: buccal mucosa moist, pharynx not injected  Neck: supple, trachea midline  Lungs: clear, no wheeze/rhonchi  Cardiovascular: regular rate and rhythm, S1 S2  Abdomen: soft, tender, Distended, bowel sounds normal  Neurological: alert and oriented x3, Cranial Nerves II-XII grossly intact  Skin: no increased ecchymosis/petechiae/purpura  Lymph Nodes: no palpable cervical/supraclavicular lymph nodes enlargements  Extremities: no cyanosis/clubbing  kaz LE less swelling less erythema      LABS:                    10.7   6.2   )-----------( 177      ( 20 Oct 2017 07:07 )             32.8       10-20    132<L>  |  97  |  21  ----------------------------<  202<H>  3.9   |  28  |  0.70    Ca    7.5<L>      20 Oct 2017 07:07    TPro  6.7  /  Alb  1.7<L>  /  TBili  0.8  /  DBili  .40<H>  /  AST  50<H>  /  ALT  46  /  AlkPhos  297<H>  10-19      PT/INR - ( 18 Oct 2017 18:51 )   PT: 11.4 sec;   INR: 1.04 ratio         PTT - ( 18 Oct 2017 18:51 )  PTT:32.4 sec      MICROBIOLOGY:  Culture - Blood (collected 19 Oct 2017 00:19)  Source: .Blood Blood-Peripheral  Preliminary Report (20 Oct 2017 01:02):    No growth to date.    Culture - Blood (collected 19 Oct 2017 00:19)  Source: .Blood Blood-Peripheral  Preliminary Report (20 Oct 2017 01:02):    No growth to date.    RADIOLOGY & ADDITIONAL STUDIES:    EXAM:  CHEST 1 VIEW                            PROCEDURE DATE:  10/18/2017      INTERPRETATION:  History: Cellulitis    Portable AP radiograph of the chest is performed. Comparison is made to   9/3/2017.    The cardiomediastinal silhouette is normal. there is no focal infiltrate   or pleural effusion. The osseous structures demonstrate degenerative   changes. The trachea is midline.    Impression: No active pulmonary disease. No change    Assessment :   62 y/o w/m h/o liver CA, alcoholic pancreatitis, Hep-C , HTN , IDDM admitted with bilateral  LE cellulitis sec edema  +Ascites sp paracentesis Doubt SBP      Plan :   Cont Vancomycin Zosyn  Fu cultures  Elevate leg  Wound care following  Diurese per primary team    Continue with present regiment.  Appropriate use of antibiotics and adverse effects reviewed.      I have discussed the above plan of care with patient/ family in detail. They expressed understanding of the  treatment plan . Risks, benefits and alternatives discussed in detail.   I have asked if they have any questions or concerns and appropriately addressed them to the best of my ability .    > 35 minutes were spent in direct patient care reviewing notes, medications ,labs data/ imaging , discussion with multidisciplinary team.    Thank you for allowing me to participate in care of your patient .    Scarlett Johnson MD  Infectious Disease  [phone]

## 2017-10-20 NOTE — PROGRESS NOTE ADULT - ASSESSMENT
60 y/o w/m h/o liver CA, alcoholic pancreatitis, Hep-C , HTN , IDDM admitted for cellulitis of bilateral lower extremities 62 y/o M h/o HCC, alcoholic pancreatitis, Hep-C , HTN , DM2, admitted for cellulitis of bilateral lower extremities and abdominal pain.

## 2017-10-20 NOTE — CONSULT NOTE ADULT - PROBLEM SELECTOR PROBLEM 1
Venous stasis ulcer of other part of lower leg with other ulcer severity with varicose veins, unspecified laterality
HCC (hepatocellular carcinoma)
Ascites

## 2017-10-20 NOTE — CONSULT NOTE ADULT - PROBLEM SELECTOR RECOMMENDATION 4
check FOBT, check retic.   Observe for now. Transfuse if bleeding, if symptomatic, if Hgb <7.  DVT prophylaxis.

## 2017-10-20 NOTE — CONSULT NOTE ADULT - ASSESSMENT
Venous stasis ulcers bilateral leg, skin, subcutaneous level, Ca of liver
hx of hepatocellular carcinoma Did not tolerate nexavaar.   On home hospice since Mid Aug 2017.   DC self out of hospice 3wk prior.   Admitted with cellulitis.     last CBC 08/14/17 with WBC 7.5, Hgb 13.3, plt 191, MCV 96.3,   Now with macrocytic anemia and intermittent thrombocytopenia.   Possible etiology include ?EtOH, ? bleed with reticulocytosis.    B12 and folate are replete.

## 2017-10-20 NOTE — PROGRESS NOTE ADULT - PROBLEM SELECTOR PLAN 3
Hold home meds - insulin sliding scale, accuchecks, hypoglycemia protocol Hold home meds - finger sticks glucose elevated, will change to moderate insulin sliding scale for better coverage, accuchecks, hypoglycemia protocol

## 2017-10-20 NOTE — BRIEF OPERATIVE NOTE - PROCEDURE
<<-----Click on this checkbox to enter Procedure Paracentesis, abdominal, diagnostic, initial  10/20/2017    Active  WFORMAN

## 2017-10-20 NOTE — PROGRESS NOTE ADULT - PROBLEM SELECTOR PLAN 2
Given extensive hx of hepatic pathology, hep c, hepatic ca, etoh abuse, ?cirrhosis - possible distention 2/2 ascites   - pt on lasix, monitor distention Likely ascites 2/2 extensive hx of hepatic pathology, hep c, hepatic ca, etoh abuse, cirrhosis   - per GI paracentesis today 10/20  - pt on lasix, monitor distention Likely ascites 2/2 extensive hx of hepatic pathology, hep c, hepatic ca, etoh abuse, cirrhosis   - per GI, possible paracentesis today, r/o SBP  - continue diuretic therapy Likely ascites 2/2 extensive hx of hepatic pathology, hep C, HCC, etoh abuse, cirrhosis   - per GI, possible paracentesis today, r/o SBP  - continue diuretic therapy

## 2017-10-20 NOTE — CONSULT NOTE ADULT - PROBLEM SELECTOR RECOMMENDATION 9
secondary to cirrhosis/HCC  given peripheral edema, abdominal tension, hyponatremia -- patient will benefit from large volume therapeutic paracentesis + albumin for every liter removed over 3  check fluid cell count/culture/gram stain to rule out SBP given abdominal pain   diuretic therapy if SBP ruled out
Bactroban daily, no dressing
no inpatient treatment at current time. Can follow in office if pt choses to resume care or treatment.

## 2017-10-20 NOTE — PROGRESS NOTE ADULT - PROBLEM SELECTOR PLAN 2
secondary to hcv/etio  MELD 7  + HCC with PVT, not a surgical candidate  follows up with hepatologist  f/ CT for evaluation of HCC  f/u HCV viral load  EGD once optimized for variceal surveillance

## 2017-10-20 NOTE — PROGRESS NOTE ADULT - PROBLEM SELECTOR PLAN 1
No signs of systemic infection at this point, no leukocytosis, afebrile, lactate wnl, await procal. Localized to b/l LE, LLE erythema up to tibial plateau.   - continue zosyn & vancomycin, concerned for MRSA, pt recently seen at wound care  - F/u blood cultures   - wound care consulted, apprec recs  - keep legs elevated  - will continue lasix home med for lower extremity edema Clinically, no signs of systemic infection at this point, no leukocytosis, afebrile, lactate wnl. Localized to b/l LE, roughly to mid tibial area. Per wound care - likely venous stasis   - Procal mildly elevated at 0.21   - continue zosyn & vancomycin, concerned for MRSA, pt recently seen at wound care  - Blood cultures NGTD   - wound care consulted, apprec recs  - keep legs elevated  - will continue lasix home med for lower extremity edema No signs of systemic infection at this point, no leukocytosis, afebrile, lactate wnl. Localized to b/l LE, roughly to mid tibial area.  Likely venous stasis superimposed with cellulitis.  Significantly improved today, decreased erythema, swelling and tenderness.  - Procal mildly elevated at 0.21  - continue zosyn & vancomycin, NGTD on blood cultures  - keep legs elevated  - will continue lasix home med for lower extremity edema  - continue LE management with bactroban  - f/u ID recs No signs of systemic infection at this point, no leukocytosis, afebrile, lactate wnl. Localized to b/l LE, roughly to mid tibial area.  Likely venous stasis superimposed with cellulitis.  Significantly improved today, decreased erythema, swelling and tenderness.  - Procal mildly elevated at 0.21  - continue zosyn & vancomycin, (can likely d/c zosyn once SBP r/out) NGTD on blood cultures  - keep legs elevated  - will continue lasix home med for lower extremity edema  - continue LE management with bactroban  - f/u ID recs

## 2017-10-20 NOTE — PROGRESS NOTE ADULT - SUBJECTIVE AND OBJECTIVE BOX
Patient is a 61y old  Male who presents with a chief complaint of Leg Pain/Swelling  Abdominal Swelling (19 Oct 2017 00:05)    HPI: 60 y/o w/m h/o liver CA, alcoholic pancreatitis, Hep-C , HTN , IDDM presents to the ED with bilateral cellulitis of his lower extremities. Patient states that approximately 2 weeks ago he scraped his right knee and 4 days later he noticed swelling around his right knee. The swelling spread to his left leg and then he began developing blisters on the dorsal surfaces of his left foot and right shin. He describes his legs as being in pulsing pain. Earlier today he went to a podiatrist, who directed him to wound care, which advised him to come to the ED. He denies fever, chills, headache, nausea, vomiting, diarrhea. He admits shortness of breath when walking, relating the pain in his feet.    INTERVAL/OVERNIGHT EVENTS: Pt seen and examined at bedside, reports feeling well, no acute overnight events. Currently c/o b/l LE tenderness, erythema that is improving, abdominal tenderness on palpation and distention. Denies chest pain, SOB, fevers, chills, dysuria at this time.     MEDICATIONS  (STANDING):  dextrose 5%. 1000 milliLiter(s) (50 mL/Hr) IV Continuous <Continuous>  dextrose 50% Injectable 12.5 Gram(s) IV Push once  dextrose 50% Injectable 25 Gram(s) IV Push once  furosemide    Tablet 40 milliGRAM(s) Oral daily  heparin  Injectable 5000 Unit(s) SubCutaneous every 12 hours  influenza   Vaccine 0.5 milliLiter(s) IntraMuscular once  insulin glargine Injectable (LANTUS) 34 Unit(s) SubCutaneous at bedtime  insulin lispro (HumaLOG) corrective regimen sliding scale   SubCutaneous three times a day before meals  morphine ER Tablet 60 milliGRAM(s) Oral three times a day  oxyCODONE    IR 30 milliGRAM(s) Oral every 6 hours  pantoprazole    Tablet 40 milliGRAM(s) Oral before breakfast  piperacillin/tazobactam IVPB. 3.375 Gram(s) IV Intermittent every 8 hours  vancomycin  IVPB 1000 milliGRAM(s) IV Intermittent every 12 hours  vancomycin  IVPB 1000 milliGRAM(s) IV Intermittent once    MEDICATIONS  (PRN):  dextrose Gel 1 Dose(s) Oral once PRN Blood Glucose LESS THAN 70 milliGRAM(s)/deciliter  glucagon  Injectable 1 milliGRAM(s) IntraMuscular once PRN Glucose LESS THAN 70 milligrams/deciliter      Allergies  No Known Allergies    Intolerances  None    ROS  CONSTITUTIONAL: No weakness, fevers or chills  EYES/ENT: No visual changes;  No vertigo or throat pain   RESPIRATORY: No cough, wheezing, hemoptysis; No shortness of breath  CARDIOVASCULAR: No chest pain or palpitations  GASTROINTESTINAL: + abdominal pain. No nausea, vomiting, or hematemesis; No diarrhea or constipation. No melena or hematochezia.  GENITOURINARY: No dysuria, frequency or hematuria  NEUROLOGICAL: No numbness or weakness  SKIN: B/L LE edema, erythema, multiple blisters, open & healing wounds, pressure/pain in legs      Vital Signs Last 24 Hrs  T(C): 36.8 (19 Oct 2017 05:23), Max: 36.8 (19 Oct 2017 05:23)  T(F): 98.2 (19 Oct 2017 05:23), Max: 98.2 (19 Oct 2017 05:23)  HR: 89 (19 Oct 2017 05:23) (89 - 125)  BP: 133/69 (19 Oct 2017 05:23) (133/69 - 160/93)  BP(mean): --  RR: 18 (19 Oct 2017 05:23) (14 - 18)  SpO2: 97% (19 Oct 2017 05:23) (97% - 99%)    Physical exam  General: NAD, pt lying in bed comfortably, fluctuating level of consciousness  Neurology: A&Ox3, AGUILAR x 4  Respiratory: CTA B/L, no wheezes, rales, ronchi   CV: RRR, S1S2, no murmurs, rubs or gallops  Abdominal: firm, moderately distended, diffusely tender to palpation, no guarding or rebound, + BS  Extremities: B/L LE edema, erythema, multiple healing wounds throughout extremities, multiple blisters on legs       LABS:                        11.0   6.5   )-----------( 174      ( 19 Oct 2017 07:41 )             33.0     10-19    133<L>  |  99  |  24<H>  ----------------------------<  68<L>  3.8   |  29  |  0.48<L>    Ca    7.8<L>      19 Oct 2017 07:41    TPro  8.2  /  Alb  2.1<L>  /  TBili  0.8  /  DBili  x   /  AST  59<H>  /  ALT  56  /  AlkPhos  354<H>  10-18    PT/INR - ( 18 Oct 2017 18:51 )   PT: 11.4 sec;   INR: 1.04 ratio         PTT - ( 18 Oct 2017 18:51 )  PTT:32.4 sec      RADIOLOGY & ADDITIONAL TESTS:  EXAM:  CHEST 1 VIEW                        PROCEDURE DATE:  10/18/2017    INTERPRETATION:  History: Cellulitis  Portable AP radiograph of the chest is performed. Comparison is made to   9/3/2017.  The cardiomediastinal silhouette is normal. there is no focal infiltrate   or pleural effusion. The osseous structures demonstrate degenerative   changes. The trachea is midline.    Impression: No active pulmonary disease. No change Patient is a 61y old  Male who presents with a chief complaint of Leg Pain/Swelling  Abdominal Swelling (19 Oct 2017 00:05)    HPI: 62 y/o w/m h/o liver CA, alcoholic pancreatitis, Hep-C , HTN , IDDM presents to the ED with bilateral cellulitis of his lower extremities. Patient states that approximately 2 weeks ago he scraped his right knee and 4 days later he noticed swelling around his right knee. The swelling spread to his left leg and then he began developing blisters on the dorsal surfaces of his left foot and right shin. He describes his legs as being in pulsing pain. Earlier today he went to a podiatrist, who directed him to wound care, which advised him to come to the ED. He denies fever, chills, headache, nausea, vomiting, diarrhea. He admits shortness of breath when walking, relating the pain in his feet.    INTERVAL/OVERNIGHT EVENTS: Pt seen and examined at bedside, reports feeling well, no acute overnight events. Currently c/o b/l LE tenderness, erythema that is improving, abdominal tenderness on palpation and distention. Denies chest pain, SOB, fevers, chills, dysuria at this time.     MEDICATIONS  (STANDING):  dextrose 5%. 1000 milliLiter(s) (50 mL/Hr) IV Continuous <Continuous>  dextrose 50% Injectable 12.5 Gram(s) IV Push once  dextrose 50% Injectable 25 Gram(s) IV Push once  furosemide    Tablet 40 milliGRAM(s) Oral daily  heparin  Injectable 5000 Unit(s) SubCutaneous every 12 hours  influenza   Vaccine 0.5 milliLiter(s) IntraMuscular once  insulin glargine Injectable (LANTUS) 34 Unit(s) SubCutaneous at bedtime  insulin lispro (HumaLOG) corrective regimen sliding scale   SubCutaneous three times a day before meals  morphine ER Tablet 60 milliGRAM(s) Oral three times a day  oxyCODONE    IR 30 milliGRAM(s) Oral every 6 hours  pantoprazole    Tablet 40 milliGRAM(s) Oral before breakfast  piperacillin/tazobactam IVPB. 3.375 Gram(s) IV Intermittent every 8 hours  vancomycin  IVPB 1000 milliGRAM(s) IV Intermittent every 12 hours  vancomycin  IVPB 1000 milliGRAM(s) IV Intermittent once    MEDICATIONS  (PRN):  dextrose Gel 1 Dose(s) Oral once PRN Blood Glucose LESS THAN 70 milliGRAM(s)/deciliter  glucagon  Injectable 1 milliGRAM(s) IntraMuscular once PRN Glucose LESS THAN 70 milligrams/deciliter      Allergies  No Known Allergies    Intolerances  None    ROS  CONSTITUTIONAL: No weakness, fevers or chills  EYES/ENT: No visual changes;  No vertigo or throat pain   RESPIRATORY: No cough, wheezing, hemoptysis; No shortness of breath  CARDIOVASCULAR: No chest pain or palpitations  GASTROINTESTINAL: + abdominal pain. No nausea, vomiting, or hematemesis; No diarrhea or constipation. No melena or hematochezia.  GENITOURINARY: No dysuria, frequency or hematuria  NEUROLOGICAL: No numbness or weakness  SKIN: B/L LE edema, erythema, multiple blisters, open & healing wounds, pressure/pain in legs      Vital Signs Last 24 Hrs  T(C): 36.8 (20 Oct 2017 04:17), Max: 36.8 (20 Oct 2017 04:17)  T(F): 98.3 (20 Oct 2017 04:17), Max: 98.3 (20 Oct 2017 04:17)  HR: 102 (20 Oct 2017 04:17) (102 - 106)  BP: 120/75 (20 Oct 2017 04:17) (120/75 - 126/70)  BP(mean): --  RR: 18 (20 Oct 2017 04:17) (18 - 18)  SpO2: 95% (20 Oct 2017 04:17) (95% - 97%)    Physical exam  General: NAD, pt lying in bed comfortably  Neurology: A&Ox3, AGUILAR x 4  Respiratory: CTA B/L, no wheezes, rales, ronchi   CV: RRR, S1S2, no murmurs, rubs or gallops  Abdominal: firm, distended, diffusely tender to palpation, mild guarding, no rebound, + BS  Extremities: B/L LE edema, erythema improving, multiple healing wounds throughout extremities, multiple blisters on legs       LABS:                             11.0   6.5   )-----------( 174      ( 19 Oct 2017 07:41 )             33.0     10-19    133<L>  |  99  |  24<H>  ----------------------------<  68<L>  3.8   |  29  |  0.48<L>    Ca    7.8<L>      19 Oct 2017 07:41    TPro  8.2  /  Alb  2.1<L>  /  TBili  0.8  /  DBili  x   /  AST  59<H>  /  ALT  56  /  AlkPhos  354<H>  10-18    PT/INR - ( 18 Oct 2017 18:51 )   PT: 11.4 sec;   INR: 1.04 ratio         PTT - ( 18 Oct 2017 18:51 )  PTT:32.4 sec      RADIOLOGY & ADDITIONAL TESTS:  EXAM:  CHEST 1 VIEW                        PROCEDURE DATE:  10/18/2017    INTERPRETATION:  History: Cellulitis  Portable AP radiograph of the chest is performed. Comparison is made to   9/3/2017.  The cardiomediastinal silhouette is normal. there is no focal infiltrate   or pleural effusion. The osseous structures demonstrate degenerative   changes. The trachea is midline.    Impression: No active pulmonary disease. No change

## 2017-10-20 NOTE — CONSULT NOTE ADULT - PROBLEM SELECTOR RECOMMENDATION 3
secondary to HCV+/- etoh  check hcv viral load  MELD 7  pt states he had an EGD in the past and is unaware of varices, may need repeat EGD to evaluate  no s/s of hepatic encephalopathy
Follow plts. Observe for now.

## 2017-10-20 NOTE — PROGRESS NOTE ADULT - PROBLEM SELECTOR PLAN 5
Chronic - on radiation treatment - continue morphine & oxycodone for pain control Chronic - on radiation treatment - continue morphine & oxycodone for pain control  -f/up heme/onc consult

## 2017-10-21 DIAGNOSIS — D69.6 THROMBOCYTOPENIA, UNSPECIFIED: ICD-10-CM

## 2017-10-21 DIAGNOSIS — D63.0 ANEMIA IN NEOPLASTIC DISEASE: ICD-10-CM

## 2017-10-21 LAB
ANION GAP SERPL CALC-SCNC: 6 MMOL/L — SIGNIFICANT CHANGE UP (ref 5–17)
BUN SERPL-MCNC: 18 MG/DL — SIGNIFICANT CHANGE UP (ref 7–23)
CALCIUM SERPL-MCNC: 8.1 MG/DL — LOW (ref 8.5–10.1)
CHLORIDE SERPL-SCNC: 98 MMOL/L — SIGNIFICANT CHANGE UP (ref 96–108)
CO2 SERPL-SCNC: 31 MMOL/L — SIGNIFICANT CHANGE UP (ref 22–31)
CREAT SERPL-MCNC: 0.8 MG/DL — SIGNIFICANT CHANGE UP (ref 0.5–1.3)
GLUCOSE SERPL-MCNC: 101 MG/DL — HIGH (ref 70–99)
HCT VFR BLD CALC: 34.5 % — LOW (ref 39–50)
HGB BLD-MCNC: 11.2 G/DL — LOW (ref 13–17)
MCHC RBC-ENTMCNC: 32.6 GM/DL — SIGNIFICANT CHANGE UP (ref 32–36)
MCHC RBC-ENTMCNC: 33.5 PG — SIGNIFICANT CHANGE UP (ref 27–34)
MCV RBC AUTO: 102.9 FL — HIGH (ref 80–100)
PLATELET # BLD AUTO: 170 K/UL — SIGNIFICANT CHANGE UP (ref 150–400)
POTASSIUM SERPL-MCNC: 4 MMOL/L — SIGNIFICANT CHANGE UP (ref 3.5–5.3)
POTASSIUM SERPL-SCNC: 4 MMOL/L — SIGNIFICANT CHANGE UP (ref 3.5–5.3)
RBC # BLD: 3.27 M/UL — LOW (ref 4.2–5.8)
RBC # BLD: 3.35 M/UL — LOW (ref 4.2–5.8)
RBC # FLD: 14.7 % — HIGH (ref 10.3–14.5)
RETICS #: 114.5 K/UL — SIGNIFICANT CHANGE UP (ref 25–125)
RETICS/RBC NFR: 3.5 % — HIGH (ref 0.5–2.5)
SODIUM SERPL-SCNC: 135 MMOL/L — SIGNIFICANT CHANGE UP (ref 135–145)
WBC # BLD: 6.1 K/UL — SIGNIFICANT CHANGE UP (ref 3.8–10.5)
WBC # FLD AUTO: 6.1 K/UL — SIGNIFICANT CHANGE UP (ref 3.8–10.5)

## 2017-10-21 PROCEDURE — 74177 CT ABD & PELVIS W/CONTRAST: CPT | Mod: 26

## 2017-10-21 PROCEDURE — 99233 SBSQ HOSP IP/OBS HIGH 50: CPT

## 2017-10-21 RX ORDER — HYDROMORPHONE HYDROCHLORIDE 2 MG/ML
0.5 INJECTION INTRAMUSCULAR; INTRAVENOUS; SUBCUTANEOUS ONCE
Qty: 0 | Refills: 0 | Status: DISCONTINUED | OUTPATIENT
Start: 2017-10-21 | End: 2017-10-21

## 2017-10-21 RX ORDER — HYDROMORPHONE HYDROCHLORIDE 2 MG/ML
0.5 INJECTION INTRAMUSCULAR; INTRAVENOUS; SUBCUTANEOUS EVERY 8 HOURS
Qty: 0 | Refills: 0 | Status: DISCONTINUED | OUTPATIENT
Start: 2017-10-21 | End: 2017-10-23

## 2017-10-21 RX ORDER — IOHEXOL 300 MG/ML
500 INJECTION, SOLUTION INTRAVENOUS
Qty: 0 | Refills: 0 | Status: COMPLETED | OUTPATIENT
Start: 2017-10-21 | End: 2017-10-21

## 2017-10-21 RX ADMIN — Medication 166.67 MILLIGRAM(S): at 05:48

## 2017-10-21 RX ADMIN — INSULIN GLARGINE 34 UNIT(S): 100 INJECTION, SOLUTION SUBCUTANEOUS at 22:28

## 2017-10-21 RX ADMIN — MORPHINE SULFATE 60 MILLIGRAM(S): 50 CAPSULE, EXTENDED RELEASE ORAL at 22:28

## 2017-10-21 RX ADMIN — HYDROMORPHONE HYDROCHLORIDE 0.5 MILLIGRAM(S): 2 INJECTION INTRAMUSCULAR; INTRAVENOUS; SUBCUTANEOUS at 04:50

## 2017-10-21 RX ADMIN — MORPHINE SULFATE 60 MILLIGRAM(S): 50 CAPSULE, EXTENDED RELEASE ORAL at 13:59

## 2017-10-21 RX ADMIN — HYDROMORPHONE HYDROCHLORIDE 0.5 MILLIGRAM(S): 2 INJECTION INTRAMUSCULAR; INTRAVENOUS; SUBCUTANEOUS at 09:05

## 2017-10-21 RX ADMIN — OXYCODONE HYDROCHLORIDE 30 MILLIGRAM(S): 5 TABLET ORAL at 05:47

## 2017-10-21 RX ADMIN — OXYCODONE HYDROCHLORIDE 30 MILLIGRAM(S): 5 TABLET ORAL at 12:31

## 2017-10-21 RX ADMIN — PIPERACILLIN AND TAZOBACTAM 25 GRAM(S): 4; .5 INJECTION, POWDER, LYOPHILIZED, FOR SOLUTION INTRAVENOUS at 09:21

## 2017-10-21 RX ADMIN — OXYCODONE HYDROCHLORIDE 30 MILLIGRAM(S): 5 TABLET ORAL at 13:00

## 2017-10-21 RX ADMIN — IOHEXOL 500 MILLILITER(S): 300 INJECTION, SOLUTION INTRAVENOUS at 09:24

## 2017-10-21 RX ADMIN — MORPHINE SULFATE 60 MILLIGRAM(S): 50 CAPSULE, EXTENDED RELEASE ORAL at 14:59

## 2017-10-21 RX ADMIN — HYDROMORPHONE HYDROCHLORIDE 0.5 MILLIGRAM(S): 2 INJECTION INTRAMUSCULAR; INTRAVENOUS; SUBCUTANEOUS at 08:36

## 2017-10-21 RX ADMIN — OXYCODONE HYDROCHLORIDE 30 MILLIGRAM(S): 5 TABLET ORAL at 17:47

## 2017-10-21 RX ADMIN — HEPARIN SODIUM 5000 UNIT(S): 5000 INJECTION INTRAVENOUS; SUBCUTANEOUS at 05:49

## 2017-10-21 RX ADMIN — PIPERACILLIN AND TAZOBACTAM 25 GRAM(S): 4; .5 INJECTION, POWDER, LYOPHILIZED, FOR SOLUTION INTRAVENOUS at 19:04

## 2017-10-21 RX ADMIN — Medication 166.67 MILLIGRAM(S): at 17:53

## 2017-10-21 RX ADMIN — MORPHINE SULFATE 60 MILLIGRAM(S): 50 CAPSULE, EXTENDED RELEASE ORAL at 05:47

## 2017-10-21 RX ADMIN — HEPARIN SODIUM 5000 UNIT(S): 5000 INJECTION INTRAVENOUS; SUBCUTANEOUS at 18:03

## 2017-10-21 RX ADMIN — PANTOPRAZOLE SODIUM 40 MILLIGRAM(S): 20 TABLET, DELAYED RELEASE ORAL at 05:48

## 2017-10-21 RX ADMIN — MUPIROCIN 1 APPLICATION(S): 20 OINTMENT TOPICAL at 12:35

## 2017-10-21 RX ADMIN — OXYCODONE HYDROCHLORIDE 30 MILLIGRAM(S): 5 TABLET ORAL at 06:57

## 2017-10-21 RX ADMIN — Medication 40 MILLIGRAM(S): at 05:48

## 2017-10-21 RX ADMIN — Medication 2 MILLIGRAM(S): at 22:49

## 2017-10-21 RX ADMIN — OXYCODONE HYDROCHLORIDE 30 MILLIGRAM(S): 5 TABLET ORAL at 19:05

## 2017-10-21 RX ADMIN — PIPERACILLIN AND TAZOBACTAM 25 GRAM(S): 4; .5 INJECTION, POWDER, LYOPHILIZED, FOR SOLUTION INTRAVENOUS at 01:36

## 2017-10-21 RX ADMIN — Medication 3: at 17:49

## 2017-10-21 RX ADMIN — IOHEXOL 500 MILLILITER(S): 300 INJECTION, SOLUTION INTRAVENOUS at 08:36

## 2017-10-21 NOTE — PROGRESS NOTE ADULT - ASSESSMENT
60 y/o M h/o HCC, alcoholic pancreatitis, Hep-C , HTN , DM2, admitted for cellulitis of bilateral lower extremities and abdominal pain.

## 2017-10-21 NOTE — PROGRESS NOTE ADULT - ASSESSMENT
hx of hepatocellular carcinoma, portal tumor thrombus, Did not tolerate nexavaar.   On home hospice since Mid Aug 2017.   DC self out of hospice 3wk prior.   Admitted with cellulitis. On IV Abx    last CBC 08/14/17 with WBC 7.5, Hgb 13.3, plt 191, MCV 96.3,   Now with macrocytic anemia and intermittent thrombocytopenia.   Possible etiology include ?EtOH, ? bleed with reticulocytosis.    Denies EtOH, states last was 3yrs agon. Retic not particularly elevated.   B12 and folate are replete.   CBC Stable otherwise.

## 2017-10-21 NOTE — PROGRESS NOTE ADULT - PROBLEM SELECTOR PLAN 3
Hold home meds - finger sticks glucose elevated, will change to moderate insulin sliding scale for better coverage, accuchecks, hypoglycemia protocol

## 2017-10-21 NOTE — PROGRESS NOTE ADULT - SUBJECTIVE AND OBJECTIVE BOX
INTERVAL HX:   c/o BL LE pain. Drainage from legs.       HPI:  60 y/o w/m h/o liver CA, alcoholic pancreatitis, Hep-C , HTN , IDDM presents to the ED with bilateral cellulitis of his lower extremities. Patient states that approximately 2 weeks ago he scraped his right knee and 4 days later he noticed swelling around his right knee. The swelling spread to his left leg and then he began developing blisters on the dorsal surfaces of his left foot and right shin. He describes his legs as being in pulsing pain. Earlier today he went to a podiatrist, who directed him to wound care, which advised him to come to the ED. He denies fever, chills, headache, nausea, vomiting, diarrhea. He admits shortness of breath when walking, relating the pain in his feet/    In ED VS T max 98.1 /69  RR 16 O2 sat 99 on room air WBC within normal limits lactate 2.0 H/H 12.6/38.9 .1 Na 133 BUN 26 Alk phos 354 AST 59  Received vancomycin, zosyn  CXR No active pulmonary disease  12 lead EKG sinus tachycardia with premature atrial complexes, 106 bpm (18 Oct 2017 21:22)     [facility]-Onc asked to consult on pt. Followed prior in office by Dr Oropeza.   Recently DC from hospice 3wk prior.   60 y/o an active smoker man with h/o insulin dependent diabetes, ETOH abuse, hep C who presented with liver mass s/p radioablation with Y90 R hepatic artery 3/27/2017. Last CT scan suggestive of progression.     HCC no tissue diagnosis available , however radiologically and by AFP consistent with HCC diagnosed in 2/2017   h/o ETOH abuse, hep C, current every day smoker. ECOG1 not a transplant candidate       patient developed weight loss despite normal appetite .  CT 10/01/13 and 12/05/13 enlarged fatty liver with small perihepatic edema.   CT C/A/P 02/2017 enlarged liver   MRI showed 7.7 x 5.5 x  9 cm lesion mass in segment 5,8 , thrombosis anterial portal vein   Normal bone scan  AFP 1574  s/p XRT Dr Lomax   MRI 04/27/17 showed 5.9 by 5.2 mass, cirrhosis   Had Radioablation Y90 R hepatic artery 3/27/2017 and segment 4 on 05/18/17  CT A/P 05/23/17 [compared with CT 02/13/17]: showed cirrhosis. 6.6 x 2.2 cm  mass anterior segment of the right lobe + adjacent tumor thrombus in R portal vein, small perihepatic ascites , 1.8 x 3.0 cm periportal LN( previously 3.8 by 1.5)  .5 in May 2017   on 06/26/17  MRI 06/17/17 : Liver mass 5.9 x 6.4 cm segment 5/8 unchanged. Mass segment 2  is 1.7 x 1.6 cm -->2 x 1.7 cm.        On 07/03/17 Had discussed that Tx may potentially delay the progression with Nexavar 400 mg BID .  Patient decided to proceed with Nexavar in case if insurance covers the cost ( cannot afford high copays).  On 08/17/17, pt decided to stop tx, self referred to hospice care.   never had colonoscopy.       PAST MEDICAL & SURGICAL HISTORY:  Liver cancer  Weight loss, unintentional: 45 lbs in 1 yr  Type 2 diabetes mellitus: IDDM  Smoker  Chronic pain with drug dependence: On suboxone now  HTN (hypertension): no medications  Emphysema lung  Depression  Diabetic neuropathy  Chronic alcoholic pancreatitis  Hepatitis C  S/P inguinal hernia repair: left  S/P arthroscopy of right knee  S/P shoulder surgery: bilateral  History of cholecystectomy      MEDICATIONS  (STANDING):  dextrose 5%. 1000 milliLiter(s) (50 mL/Hr) IV Continuous <Continuous>  dextrose 50% Injectable 12.5 Gram(s) IV Push once  dextrose 50% Injectable 25 Gram(s) IV Push once  furosemide    Tablet 40 milliGRAM(s) Oral daily  heparin  Injectable 5000 Unit(s) SubCutaneous every 12 hours  influenza   Vaccine 0.5 milliLiter(s) IntraMuscular once  insulin glargine Injectable (LANTUS) 34 Unit(s) SubCutaneous at bedtime  insulin lispro (HumaLOG) corrective regimen sliding scale   SubCutaneous three times a day before meals  morphine ER Tablet 60 milliGRAM(s) Oral three times a day  mupirocin 2% Ointment 1 Application(s) Topical daily  oxyCODONE    IR 30 milliGRAM(s) Oral every 6 hours  pantoprazole    Tablet 40 milliGRAM(s) Oral before breakfast  piperacillin/tazobactam IVPB. 3.375 Gram(s) IV Intermittent every 8 hours  vancomycin  IVPB 1250 milliGRAM(s) IV Intermittent every 12 hours    MEDICATIONS  (PRN):  dextrose Gel 1 Dose(s) Oral once PRN Blood Glucose LESS THAN 70 milliGRAM(s)/deciliter  glucagon  Injectable 1 milliGRAM(s) IntraMuscular once PRN Glucose LESS THAN 70 milligrams/deciliter  HYDROmorphone  Injectable 0.5 milliGRAM(s) IV Push every 8 hours PRN Severe Pain (7 - 10)      Vital Signs Last 24 Hrs  T(C): 36.4 (21 Oct 2017 14:02), Max: 36.4 (20 Oct 2017 19:58)  T(F): 97.5 (21 Oct 2017 14:02), Max: 97.5 (20 Oct 2017 19:58)  HR: 97 (21 Oct 2017 14:02) (95 - 98)  BP: 149/83 (21 Oct 2017 14:02) (116/68 - 149/83)  BP(mean): --  RR: 18 (21 Oct 2017 14:02) (17 - 19)  SpO2: 98% (21 Oct 2017 14:02) (96% - 98%)      PHYSICAL EXAM    General: adult in NAD, chronically ill looking  HEENT: clear oropharynx, anicteric sclera, pink conjunctivae  Neck: supple  CV: normal S1S2 with no murmur rubs or gallops  Lungs: clear to auscultation, no wheezes, no rales  Abdomen: soft non-tender, +-distended,   Ext: no clubbing cyanosis +BL edema. LLE>RLE erythema.   Skin: no rashes and no petechiae  Neuro: alert and oriented X3,  no focal deficits      LABS:    Vitamin B12, Serum in AM (10.20.17 @ 08:38)    Vitamin B12, Serum: 1301 pg/mL    Folate, Serum in AM (10.20.17 @ 08:38)    Folate, Serum: 16.6 ng/mL      CBC Full  -  ( 21 Oct 2017 06:45 )  WBC Count : 6.1 K/uL  Hemoglobin : 11.2 g/dL  Hematocrit : 34.5 %  Platelet Count - Automated : 170 K/uL  Mean Cell Volume : 102.9 fl  Mean Cell Hemoglobin : 33.5 pg  Mean Cell Hemoglobin Concentration : 32.6 gm/dL  Auto Neutrophil # : x  Auto Lymphocyte # : x  Auto Monocyte # : x  Auto Eosinophil # : x  Auto Basophil # : x  Auto Neutrophil % : x  Auto Lymphocyte % : x  Auto Monocyte % : x  Auto Eosinophil % : x  Auto Basophil % : x      CBC Full  -  ( 20 Oct 2017 07:07 )  WBC Count : 6.2 K/uL  Hemoglobin : 10.7 g/dL  Hematocrit : 32.8 %  Platelet Count - Automated : 177 K/uL  Mean Cell Volume : 102.2 fl  Mean Cell Hemoglobin : 33.5 pg  Mean Cell Hemoglobin Concentration : 32.7 gm/dL  Auto Neutrophil # : x  Auto Lymphocyte # : x  Auto Monocyte # : x  Auto Eosinophil # : x  Auto Basophil # : x  Auto Neutrophil % : x  Auto Lymphocyte % : x  Auto Monocyte % : x  Auto Eosinophil % : x  Auto Basophil % : x    10-21  135  |  98  |  18  ----------------------------<  101<H>  4.0   |  31  |  0.80  Ca    8.1<L>      21 Oct 2017 06:45    10-20  132<L>  |  97  |  21  ----------------------------<  202<H>  3.9   |  28  |  0.70  Ca    7.5<L>      20 Oct 2017 07:07  TPro  6.7  /  Alb  1.7<L>  /  TBili  0.8  /  DBili  .40<H>  /  AST  50<H>  /  ALT  46  /  AlkPhos  297<H>  10-19          RADIOLOGY & ADDITIONAL STUDIES:  < from: Xray Chest 1 View AP/PA (10.18.17 @ 18:15) >  EXAM:  CHEST 1 VIEW                      PROCEDURE DATE:  10/18/2017    INTERPRETATION:  History: Cellulitis    Portable AP radiograph of the chest is performed. Comparison is made to   9/3/2017.    The cardiomediastinal silhouette is normal. there is no focal infiltrate   or pleural effusion. The osseous structures demonstrate degenerative   changes. The trachea is midline.    Impression: No active pulmonary disease. No change    JUSTYNA QUIÑONES   This document has been electronically signed. Oct 18 2017  6:45PM  < end of copied text >          < from: CT Abdomen and Pelvis w/ Oral Cont and w/ IV Cont (10.21.17 @ 10:52) >  EXAM:  CT ABDOMEN AND PELVIS OC IC                        PROCEDURE DATE:  10/21/2017    INTERPRETATION:  CLINICAL INFORMATION: HCC, abdominal pain and distention.  COMPARISON: June 23, 2017. Correlation is made to MR abdomen from June 17, 2017.    PROCEDURE:   CT of the Abdomen and Pelvis was performed with intravenous contrast.   Intravenous contrast: 95 mL Omnipaque 350. 5 mL discarded.  Oral contrast: positive contrast was administered.  Sagittal and coronal reformats were performed.    FINDINGS:  LOWER CHEST: Bibasilar atelectasis or scarring.    LIVER: Mass in segment 5 measuring approximately 4.9 x 2.8 cm, consistent with hepatocellular carcinoma. Extra hepatic extension with nodule along the surface of the liver,measuring 2.2 x 1.1 cm. Extensive tumor thrombus in the right portal vein and main portal vein. Segmental branches of the right portal vein are also thrombosed. Additional focus of hepatocellular carcinoma in segment 2 (series 2, image 15), measuring 1.0 cm. Cirrhosis.  BILE DUCTS: Normal caliber.   GALLBLADDER: Status post cholecystectomy.  SPLEEN: Within normal limits.  PANCREAS: Within normal limits.  ADRENALS: Within normal limits.  KIDNEYS/URETERS: Within normal limits.     BLADDER: Within normal limits.  REPRODUCTIVE ORGANS: The prostate is enlarged.    BOWEL: Fecalized loops of small bowel in the right lower quadrant with gradual transition to decompressed distal bowel loops in the right mid abdomen (series 4, image 90 and series 602, image 80), possibly partial small bowel obstruction.    PERITONEUM: Moderate volume volume abdominopelvic ascites.  VESSELS:  Paraesophageal varices.  RETROPERITONEUM: No lymphadenopathy.    ABDOMINAL WALL: Within normal limits.  BONES: Degenerative changes of the spine.    IMPRESSION:   1.  Fecalized loops of small bowel in the right lower quadrant with gradual transition to decompressed distal bowel loops in the right mid abdomen, possibly partial small bowel obstruction.   2.Hepatocellular carcinoma with extensive tumor thrombus in the right portal vein and main portal vein. This was noted on MR abdomen from June 17, 2017.  3.  Moderate volume abdominopelvic ascites.  ROE ULRICH M.D., ATTENDING RADIOLOGIST  This document has been electronically signed. Oct 21 2017 11:31AM  < end of copied text >

## 2017-10-21 NOTE — PROGRESS NOTE ADULT - SUBJECTIVE AND OBJECTIVE BOX
BROOKE BERGERON is a 61yMale , patient examined and chart reviewed.     INTERVAL HPI/ OVERNIGHT EVENTS:   Afebrile. No events.    PAST MEDICAL & SURGICAL HISTORY:  Liver cancer  Weight loss, unintentional: 45 lbs in 1 yr  Type 2 diabetes mellitus: IDDM  Smoker  Chronic pain with drug dependence: On suboxone now  HTN (hypertension): no medications  Emphysema lung  Depression  Diabetic neuropathy  Chronic alcoholic pancreatitis  Hepatitis C  S/P inguinal hernia repair: left  S/P arthroscopy of right knee  S/P shoulder surgery: bilateral  History of cholecystectomy    For details regarding the patient's social history, family history, and other miscellaneous elements, please refer the initial infectious diseases consultation and/or the admitting history and physical examination for this admission.    ROS:  CONSTITUTIONAL:  Negative fever or chills, feels well, good appetite  EYES:  Negative  blurry vision or double vision  CARDIOVASCULAR:  Negative for chest pain or palpitations  RESPIRATORY:  Negative for cough, wheezing, or SOB   GASTROINTESTINAL:  Negative for nausea, vomiting, diarrhea, constipation, + abdominal pain  GENITOURINARY:  Negative frequency, urgency or dysuria  NEUROLOGIC:  No headache, confusion, dizziness, lightheadedness  All other systems were reviewed and are negative     Current inpatient medications :    ANTIBIOTICS/RELEVANT:  piperacillin/tazobactam IVPB. 3.375 Gram(s) IV Intermittent every 8 hours  vancomycin  IVPB 1250 milliGRAM(s) IV Intermittent every 12 hours    dextrose 5%. 1000 milliLiter(s) IV Continuous <Continuous>  dextrose 50% Injectable 12.5 Gram(s) IV Push once  dextrose 50% Injectable 25 Gram(s) IV Push once  dextrose Gel 1 Dose(s) Oral once PRN  furosemide    Tablet 40 milliGRAM(s) Oral daily  glucagon  Injectable 1 milliGRAM(s) IntraMuscular once PRN  heparin  Injectable 5000 Unit(s) SubCutaneous every 12 hours  HYDROmorphone  Injectable 0.5 milliGRAM(s) IV Push every 8 hours PRN  insulin glargine Injectable (LANTUS) 34 Unit(s) SubCutaneous at bedtime  insulin lispro (HumaLOG) corrective regimen sliding scale   SubCutaneous three times a day before meals  morphine ER Tablet 60 milliGRAM(s) Oral three times a day  mupirocin 2% Ointment 1 Application(s) Topical daily  oxyCODONE    IR 30 milliGRAM(s) Oral every 6 hours  pantoprazole    Tablet 40 milliGRAM(s) Oral before breakfast      Objective:    10-20 @ 07:01  -  10-21 @ 07:00  --------------------------------------------------------  IN: 1360 mL / OUT: 550 mL / NET: 810 mL    10-21 @ 07:01  -  10-21 @ 19:38  --------------------------------------------------------  IN: 600 mL / OUT: 0 mL / NET: 600 mL      T(C): 36.4 (10-21-17 @ 14:02), Max: 36.4 (10-20-17 @ 19:58)  HR: 97 (10-21-17 @ 14:02) (95 - 98)  BP: 149/83 (10-21-17 @ 14:02) (116/68 - 149/83)  RR: 18 (10-21-17 @ 14:02) (17 - 19)  SpO2: 98% (10-21-17 @ 14:02) (96% - 98%)  Wt(kg): --      Physical Exam:  General: No acute distress  Eyes: sclera anicteric, pupils equal and reactive to light  ENMT: buccal mucosa moist, pharynx not injected  Neck: supple, trachea midline  Lungs: clear, no wheeze/rhonchi  Cardiovascular: regular rate and rhythm, S1 S2  Abdomen: soft, Mildly tender,Distended + ascites bowel sounds normal  Neurological: alert and oriented x3, Cranial Nerves II-XII grossly intact  Skin: no increased ecchymosis/petechiae/purpura  Lymph Nodes: no palpable cervical/supraclavicular lymph nodes enlargements  Extremities: no cyanosis/clubbing  +edema with kaz erythema      LABS:                          11.2   6.1   )-----------( 170      ( 21 Oct 2017 06:45 )             34.5       10-21    135  |  98  |  18  ----------------------------<  101<H>  4.0   |  31  |  0.80    Ca    8.1<L>      21 Oct 2017 06:45      Vancomycin Level, Trough: 8.0 ug/mL (10-20 @ 17:37)    MICROBIOLOGY:    Culture - Body Fluid with Gram Stain (collected 20 Oct 2017 21:36)  Source: .Body Fluid Peritoneal Fluid  Gram Stain (20 Oct 2017 23:27):    No polymorphonuclear cells seen    No organisms seen    by cytocentrifuge  Preliminary Report (21 Oct 2017 18:04):    No growth to date    Culture - Blood (collected 19 Oct 2017 00:19)  Source: .Blood Blood-Peripheral  Preliminary Report (20 Oct 2017 01:02):    No growth to date.    Culture - Blood (collected 19 Oct 2017 00:19)  Source: .Blood Blood-Peripheral  Preliminary Report (20 Oct 2017 01:02):    No growth to date.    RADIOLOGY & ADDITIONAL STUDIES:      EXAM:  CT ABDOMEN AND PELVIS OC IC                            PROCEDURE DATE:  10/21/2017          INTERPRETATION:  CLINICAL INFORMATION: HCC, abdominal pain and distention.    COMPARISON: June 23, 2017. Correlation is made to MR abdomen from June 17, 2017.    PROCEDURE:   CT of the Abdomen and Pelvis was performed with intravenous contrast.   Intravenous contrast: 95 mL Omnipaque 350. 5 mL discarded.  Oral contrast: positive contrast was administered.  Sagittal and coronal reformats were performed.    FINDINGS:    LOWER CHEST: Bibasilar atelectasis or scarring.    LIVER: Mass in segment 5 measuring approximately 4.9 x 2.8 cm, consistent   with hepatocellular carcinoma. Extra hepatic extension with nodule along   the surface of the liver,measuring 2.2 x 1.1 cm. Extensive tumor   thrombus in the right portal vein and main portal vein. Segmental   branches of the right portal vein are also thrombosed. Additional focus   of hepatocellular carcinoma in segment 2 (series 2, image 15), measuring   1.0 cm. Cirrhosis.  BILE DUCTS: Normal caliber.   GALLBLADDER: Status post cholecystectomy.  SPLEEN: Within normal limits.  PANCREAS: Within normal limits.  ADRENALS: Within normal limits.  KIDNEYS/URETERS: Within normal limits.     BLADDER: Within normal limits.  REPRODUCTIVE ORGANS: The prostate is enlarged.    BOWEL: Fecalized loops of small bowel in the right lower quadrant with   gradual transition to decompressed distal bowel loops in the right mid   abdomen (series 4, image 90 and series 602, image 80), possibly partial   small bowel obstruction.    PERITONEUM: Moderate volume volume abdominopelvic ascites.  VESSELS:  Paraesophageal varices.  RETROPERITONEUM: No lymphadenopathy.    ABDOMINAL WALL: Within normal limits.  BONES: Degenerative changes of the spine.    IMPRESSION:   1.  Fecalized loops of small bowel in the right lower quadrant with   gradual transition to decompressed distal bowel loops in the right mid   abdomen, possibly partial small bowel obstruction.   2.Hepatocellular carcinoma with extensive tumor thrombus in the right   portal vein and main portal vein. This was noted on MR abdomen from June 17, 2017.  3.  Moderate volume abdominopelvic ascites.          Assessment :   60 y/o w/m advanced liver CA, alcoholic pancreatitis, Hep-C , HTN , IDDM admitted with bilateral  LE cellulitis sec edema  +Ascites sp paracentesis Doubt SBP  CT a/p noted      Plan :   Cont Vancomycin  Dc Zosyn  Elevate leg  Wound care following  Diurese per primary team  Will change to po antibx in 48 hours  Prognosis poor    Continue with present regiment.  Appropriate use of antibiotics and adverse effects reviewed.      I have discussed the above plan of care with patient/ family in detail. They expressed understanding of the  treatment plan . Risks, benefits and alternatives discussed in detail.   I have asked if they have any questions or concerns and appropriately addressed them to the best of my ability .    > 35 minutes were spent in direct patient care reviewing notes, medications ,labs data/ imaging , discussion with multidisciplinary team.    Thank you for allowing me to participate in care of your patient .    Scarlett Johnson MD  Infectious Disease  [phone]

## 2017-10-21 NOTE — CHART NOTE - NSCHARTNOTEFT_GEN_A_CORE
Called by RN that pt was complaining of 9/10 pain in feet where cellulitis is. Came to assess patient, patient sleeping comfortably in bed. Instructed nurse to call back if patient wakes up and complains of pain again. Called by RN that pt was complaining of 9/10 pain in feet where cellulitis is. Came to assess patient, patient sleeping comfortably in bed. Instructed nurse to call back if patient wakes up and complains of pain again.     ADDENDUM:  Called by RN that patient was awake and complaining of pain. Patient seen and examined at bedside, currently sleeping. Awoke patient. Patient reports lower extremity pain, rated 9/10 when standing or applying pressure to feet. Reports last BM yesterday, denies n/v/c/d.    Vital Signs Last 24 Hrs  T(C): 36.4 (20 Oct 2017 19:58), Max: 36.5 (20 Oct 2017 13:42)  T(F): 97.5 (20 Oct 2017 19:58), Max: 97.7 (20 Oct 2017 13:42)  HR: 95 (20 Oct 2017 19:58) (95 - 99)  BP: 125/76 (20 Oct 2017 19:58) (109/64 - 125/76)  RR: 17 (20 Oct 2017 19:58) (17 - 18)  SpO2: 97% (20 Oct 2017 19:58) (96% - 97%)    Physical Exam:  General: resting comfortably in bed  Neurology: A&Ox4, nonfocal   Respiratory: CTA B/L, No W/R/R  CV: RRR, +S1/S2, no murmurs, rubs or gallops  Abdominal: distended, tender to palpation    Extremities: erythematous, multiple blisters on legs     A/P: 62 y/o w/m h/o liver CA, alcoholic pancreatitis, Hep-C , HTN , IDDM admitted for cellulitis of bilateral lower extremities  -Patient scheduled for Oxycodone and Morphine in one hour, will give 0.5mg IV Dilaudid for pain  -Case discussed with Dr. Camacho, agrees with plan

## 2017-10-21 NOTE — PROGRESS NOTE ADULT - PROBLEM SELECTOR PLAN 1
no inpatient treatment at current time. Can follow in office if pt choses to resume care or treatment.

## 2017-10-21 NOTE — PROGRESS NOTE ADULT - PROBLEM SELECTOR PLAN 1
s/p paracentesis -- r/o SBP, f/u results of cell count/culture/gram stain  CT with HCC  poor px  hospice vs outpt tx

## 2017-10-21 NOTE — PROVIDER CONTACT NOTE (MEDICATION) - ACTION/TREATMENT ORDERED:
Provider states she probably wont prescribe anything else. She may come to assess him later but prefers to read the chart at this time.

## 2017-10-21 NOTE — PROGRESS NOTE ADULT - PROBLEM SELECTOR PLAN 1
Likely venous stasis superimposed with cellulitis.  Improving.  - continue zosyn & vancomycin, (can likely d/c zosyn once SBP r/out) NGTD on blood cultures  - keep legs elevated  - will continue lasix home med for lower extremity edema  - continue LE management with bactroban  - f/u ID recs Likely venous stasis superimposed with cellulitis.  Improving.  - continue zosyn & vancomycin, (can likely d/c zosyn once SBP r/out) NGTD on blood cultures  - keep legs elevated  - will continue lasix home med for lower extremity edema  - continue LE management with bactroban  - f/u ID recs  - Wound Care per wound care team.

## 2017-10-21 NOTE — PROGRESS NOTE ADULT - PROBLEM SELECTOR PLAN 2
Resolved. Likely ascites 2/2 extensive hx of hepatic pathology, hep C, HCC, etoh abuse, cirrhosis   - per GI, possible paracentesis today, r/o SBP  - continue diuretic therapy

## 2017-10-21 NOTE — PROGRESS NOTE ADULT - SUBJECTIVE AND OBJECTIVE BOX
INTERVAL HPI/OVERNIGHT EVENTS:  No new overnight event.  No N/V/D.  Tolerating diet.     MEDICATIONS  (STANDING):  dextrose 5%. 1000 milliLiter(s) (50 mL/Hr) IV Continuous <Continuous>  dextrose 50% Injectable 12.5 Gram(s) IV Push once  dextrose 50% Injectable 25 Gram(s) IV Push once  furosemide    Tablet 40 milliGRAM(s) Oral daily  heparin  Injectable 5000 Unit(s) SubCutaneous every 12 hours  influenza   Vaccine 0.5 milliLiter(s) IntraMuscular once  insulin glargine Injectable (LANTUS) 34 Unit(s) SubCutaneous at bedtime  insulin lispro (HumaLOG) corrective regimen sliding scale   SubCutaneous three times a day before meals  morphine ER Tablet 60 milliGRAM(s) Oral three times a day  mupirocin 2% Ointment 1 Application(s) Topical daily  oxyCODONE    IR 30 milliGRAM(s) Oral every 6 hours  pantoprazole    Tablet 40 milliGRAM(s) Oral before breakfast  piperacillin/tazobactam IVPB. 3.375 Gram(s) IV Intermittent every 8 hours  vancomycin  IVPB 1250 milliGRAM(s) IV Intermittent every 12 hours    MEDICATIONS  (PRN):  dextrose Gel 1 Dose(s) Oral once PRN Blood Glucose LESS THAN 70 milliGRAM(s)/deciliter  glucagon  Injectable 1 milliGRAM(s) IntraMuscular once PRN Glucose LESS THAN 70 milligrams/deciliter  HYDROmorphone  Injectable 0.5 milliGRAM(s) IV Push every 8 hours PRN Severe Pain (7 - 10)      Allergies    No Known Allergies    Intolerances        Review of Systems:    General:  No wt loss, fevers, chills, night sweats,fatigue,   Eyes:  Good vision, no reported pain  ENT:  No sore throat, pain, runny nose, dysphagia  CV:  No pain, palpitatioins, hypo/hypertension  Resp:  No dyspnea, cough, tachypnea, wheezing  GI:  No pain, No nausea, No vomiting, No diarrhea, No constipatiion, No weight loss, No fever, No pruritis, No rectal bleeding, No tarry stools, No dysphagia,  :  No pain, bleeding, incontinence, nocturia  Muscle:  No pain, weakness  Neuro:  No weakness, tingling, memory problems  Psych:  No fatigue, insomnia, mood problems, depression  Endocrine:  No polyuria, polydypsia, cold/heat intolerance  Heme:  No petechiae, ecchymosis, easy bruisability  Skin:  No rash, tattoos, scars, edema      Vital Signs Last 24 Hrs  T(C): 36.4 (21 Oct 2017 14:02), Max: 36.4 (20 Oct 2017 19:58)  T(F): 97.5 (21 Oct 2017 14:02), Max: 97.5 (20 Oct 2017 19:58)  HR: 97 (21 Oct 2017 14:02) (95 - 98)  BP: 149/83 (21 Oct 2017 14:02) (116/68 - 149/83)  BP(mean): --  RR: 18 (21 Oct 2017 14:02) (17 - 19)  SpO2: 98% (21 Oct 2017 14:02) (96% - 98%)    PHYSICAL EXAM:    Constitutional: NAD, well-developed  HEENT: EOMI, throat clear  Neck: No LAD, supple  Respiratory: CTA and P  Cardiovascular: S1 and S2, RRR, no M  Gastrointestinal: BS+, soft, NT/ND, neg HSM,  Extremities: No peripheral edema, neg clubing, cyanosis  Vascular: 2+ peripheral pulses  Neurological: A/O x 3, no focal deficits  Psychiatric: Normal mood, normal affect  Skin: No rashes      LABS:                        11.2   6.1   )-----------( 170      ( 21 Oct 2017 06:45 )             34.5     10-21    135  |  98  |  18  ----------------------------<  101<H>  4.0   |  31  |  0.80    Ca    8.1<L>      21 Oct 2017 06:45            RADIOLOGY & ADDITIONAL TESTS:

## 2017-10-21 NOTE — PROGRESS NOTE ADULT - SUBJECTIVE AND OBJECTIVE BOX
Patient is a 61y old  Male who presents with a chief complaint of Leg Pain/Swelling      INTERVAL HPI/ OVERNIGHT EVENTS: C/O Pain in bilat LE, got 0.5mg of Dilaudid X1 overnight.     MEDICATIONS  (STANDING):  dextrose 5%. 1000 milliLiter(s) (50 mL/Hr) IV Continuous <Continuous>  dextrose 50% Injectable 12.5 Gram(s) IV Push once  dextrose 50% Injectable 25 Gram(s) IV Push once  furosemide    Tablet 40 milliGRAM(s) Oral daily  heparin  Injectable 5000 Unit(s) SubCutaneous every 12 hours  influenza   Vaccine 0.5 milliLiter(s) IntraMuscular once  insulin glargine Injectable (LANTUS) 34 Unit(s) SubCutaneous at bedtime  insulin lispro (HumaLOG) corrective regimen sliding scale   SubCutaneous three times a day before meals  iohexol 300 mG (iodine)/mL Oral Solution 500 milliLiter(s) Oral every 1 hour  morphine ER Tablet 60 milliGRAM(s) Oral three times a day  mupirocin 2% Ointment 1 Application(s) Topical daily  oxyCODONE    IR 30 milliGRAM(s) Oral every 6 hours  pantoprazole    Tablet 40 milliGRAM(s) Oral before breakfast  piperacillin/tazobactam IVPB. 3.375 Gram(s) IV Intermittent every 8 hours  vancomycin  IVPB 1250 milliGRAM(s) IV Intermittent every 12 hours    MEDICATIONS  (PRN):  dextrose Gel 1 Dose(s) Oral once PRN Blood Glucose LESS THAN 70 milliGRAM(s)/deciliter  glucagon  Injectable 1 milliGRAM(s) IntraMuscular once PRN Glucose LESS THAN 70 milligrams/deciliter  HYDROmorphone  Injectable 0.5 milliGRAM(s) IV Push every 8 hours PRN Severe Pain (7 - 10)      Allergies    No Known Allergies    Intolerances        REVIEW OF SYSTEMS:  CONSTITUTIONAL: No fever, or  fatigue  EYES: No eye pain, visual disturbances, or discharge  ENMT:  No difficulty hearing, tinnitus, vertigo; No sinus or throat pain  NECK: No pain or stiffness  RESPIRATORY: No cough, wheezing, chills or hemoptysis; No shortness of breath  CARDIOVASCULAR: No chest pain, palpitations, dizziness, or leg swelling  GASTROINTESTINAL: No abdominal or epigastric pain. No nausea, vomiting, or hematemesis; No diarrhea or constipation. No melena or hematochezia.  GENITOURINARY: No dysuria, frequency, hematuria, or incontinence  NEUROLOGICAL: No headaches, memory loss, loss of strength, numbness, or tremors  SKIN: No itching, burning, rashes, or lesions   LYMPH NODES: No enlarged glands  ENDOCRINE: No heat or cold intolerance; No hair loss; No polydipsia or polyuria  MUSCULOSKELETAL: + bilat LE erythema, edema, multiple ulcers, + Pain bilat LE.  PSYCHIATRIC: No depression, anxiety, mood swings, or difficulty sleeping  HEME/LYMPH: No easy bruising, or bleeding gums      Vital Signs Last 24 Hrs  T(C): 36.4 (21 Oct 2017 04:53), Max: 36.5 (20 Oct 2017 13:42)  T(F): 97.5 (21 Oct 2017 04:53), Max: 97.7 (20 Oct 2017 13:42)  HR: 98 (21 Oct 2017 04:53) (95 - 99)  BP: 116/68 (21 Oct 2017 04:53) (109/64 - 125/76)  BP(mean): --  RR: 19 (21 Oct 2017 04:53) (17 - 19)  SpO2: 96% (21 Oct 2017 04:53) (96% - 97%)    PHYSICAL EXAM:  GENERAL: NAD,  well-developed  HEAD:  Atraumatic, Normocephalic  EYES: EOMI, PERRLA, conjunctiva and sclera clear  ENMT: No tonsillar erythema, exudates, or enlargement; Moist mucous membranes  NECK: Supple, No JVD, Normal thyroid  NERVOUS SYSTEM:  Alert & Oriented X3, Good concentration; No focal deficits.   CHEST/LUNG: Clear to auscultation bilaterally; No rales, rhonchi, wheezing, or rubs  HEART: Regular rate and rhythm; No murmurs, rubs, or gallops  ABDOMEN: Soft, Nontender, Nondistended; Bowel sounds present  EXTREMITIES:  + bilat LE erythema, edema, ulcers.   LYMPH: No lymphadenopathy noted      LABS:                        11.2   6.1   )-----------( 170      ( 21 Oct 2017 06:45 )             34.5     21 Oct 2017 06:45    135    |  98     |  18     ----------------------------<  101    4.0     |  31     |  0.80     Ca    8.1        21 Oct 2017 06:45        CAPILLARY BLOOD GLUCOSE  189 (20 Oct 2017 17:16)  226 (20 Oct 2017 12:11)  256 (20 Oct 2017 11:06)      POCT Blood Glucose.: 110 mg/dL (21 Oct 2017 07:44)  POCT Blood Glucose.: 305 mg/dL (20 Oct 2017 21:14)  POCT Blood Glucose.: 189 mg/dL (20 Oct 2017 17:16)  POCT Blood Glucose.: 226 mg/dL (20 Oct 2017 12:13)  POCT Blood Glucose.: 256 mg/dL (20 Oct 2017 10:58)    BLOOD CULTURE  10-19 @ 00:19   No growth to date.  --  --    RADIOLOGY & ADDITIONAL TESTS:    Imaging Personally Reviewed:  [ ] YES     Consultant(s) Notes Reviewed:      Care Discussed with Consultants/Other Providers:

## 2017-10-22 LAB
ANION GAP SERPL CALC-SCNC: 4 MMOL/L — LOW (ref 5–17)
BUN SERPL-MCNC: 19 MG/DL — SIGNIFICANT CHANGE UP (ref 7–23)
CALCIUM SERPL-MCNC: 8.1 MG/DL — LOW (ref 8.5–10.1)
CHLORIDE SERPL-SCNC: 100 MMOL/L — SIGNIFICANT CHANGE UP (ref 96–108)
CO2 SERPL-SCNC: 33 MMOL/L — HIGH (ref 22–31)
CREAT SERPL-MCNC: 0.78 MG/DL — SIGNIFICANT CHANGE UP (ref 0.5–1.3)
GLUCOSE SERPL-MCNC: 100 MG/DL — HIGH (ref 70–99)
HCT VFR BLD CALC: 34.2 % — LOW (ref 39–50)
HGB BLD-MCNC: 11.1 G/DL — LOW (ref 13–17)
MCHC RBC-ENTMCNC: 32.4 GM/DL — SIGNIFICANT CHANGE UP (ref 32–36)
MCHC RBC-ENTMCNC: 33.6 PG — SIGNIFICANT CHANGE UP (ref 27–34)
MCV RBC AUTO: 103.7 FL — HIGH (ref 80–100)
PLATELET # BLD AUTO: 161 K/UL — SIGNIFICANT CHANGE UP (ref 150–400)
POTASSIUM SERPL-MCNC: 4.2 MMOL/L — SIGNIFICANT CHANGE UP (ref 3.5–5.3)
POTASSIUM SERPL-SCNC: 4.2 MMOL/L — SIGNIFICANT CHANGE UP (ref 3.5–5.3)
RBC # BLD: 3.3 M/UL — LOW (ref 4.2–5.8)
RBC # FLD: 14.6 % — HIGH (ref 10.3–14.5)
SODIUM SERPL-SCNC: 137 MMOL/L — SIGNIFICANT CHANGE UP (ref 135–145)
WBC # BLD: 6.5 K/UL — SIGNIFICANT CHANGE UP (ref 3.8–10.5)
WBC # FLD AUTO: 6.5 K/UL — SIGNIFICANT CHANGE UP (ref 3.8–10.5)

## 2017-10-22 PROCEDURE — 99233 SBSQ HOSP IP/OBS HIGH 50: CPT

## 2017-10-22 RX ADMIN — Medication 3: at 12:35

## 2017-10-22 RX ADMIN — Medication 40 MILLIGRAM(S): at 06:17

## 2017-10-22 RX ADMIN — MORPHINE SULFATE 60 MILLIGRAM(S): 50 CAPSULE, EXTENDED RELEASE ORAL at 21:50

## 2017-10-22 RX ADMIN — MORPHINE SULFATE 60 MILLIGRAM(S): 50 CAPSULE, EXTENDED RELEASE ORAL at 14:24

## 2017-10-22 RX ADMIN — OXYCODONE HYDROCHLORIDE 30 MILLIGRAM(S): 5 TABLET ORAL at 11:58

## 2017-10-22 RX ADMIN — PANTOPRAZOLE SODIUM 40 MILLIGRAM(S): 20 TABLET, DELAYED RELEASE ORAL at 06:17

## 2017-10-22 RX ADMIN — Medication 2: at 18:08

## 2017-10-22 RX ADMIN — MORPHINE SULFATE 60 MILLIGRAM(S): 50 CAPSULE, EXTENDED RELEASE ORAL at 06:19

## 2017-10-22 RX ADMIN — OXYCODONE HYDROCHLORIDE 30 MILLIGRAM(S): 5 TABLET ORAL at 06:19

## 2017-10-22 RX ADMIN — MORPHINE SULFATE 60 MILLIGRAM(S): 50 CAPSULE, EXTENDED RELEASE ORAL at 00:25

## 2017-10-22 RX ADMIN — Medication 166.67 MILLIGRAM(S): at 06:27

## 2017-10-22 RX ADMIN — OXYCODONE HYDROCHLORIDE 30 MILLIGRAM(S): 5 TABLET ORAL at 07:00

## 2017-10-22 RX ADMIN — INSULIN GLARGINE 34 UNIT(S): 100 INJECTION, SOLUTION SUBCUTANEOUS at 21:49

## 2017-10-22 RX ADMIN — MORPHINE SULFATE 60 MILLIGRAM(S): 50 CAPSULE, EXTENDED RELEASE ORAL at 20:55

## 2017-10-22 RX ADMIN — HEPARIN SODIUM 5000 UNIT(S): 5000 INJECTION INTRAVENOUS; SUBCUTANEOUS at 18:08

## 2017-10-22 RX ADMIN — OXYCODONE HYDROCHLORIDE 30 MILLIGRAM(S): 5 TABLET ORAL at 00:33

## 2017-10-22 RX ADMIN — MORPHINE SULFATE 60 MILLIGRAM(S): 50 CAPSULE, EXTENDED RELEASE ORAL at 23:34

## 2017-10-22 RX ADMIN — OXYCODONE HYDROCHLORIDE 30 MILLIGRAM(S): 5 TABLET ORAL at 18:07

## 2017-10-22 RX ADMIN — Medication 166.67 MILLIGRAM(S): at 17:20

## 2017-10-22 RX ADMIN — Medication 2 MILLIGRAM(S): at 22:37

## 2017-10-22 RX ADMIN — OXYCODONE HYDROCHLORIDE 30 MILLIGRAM(S): 5 TABLET ORAL at 04:15

## 2017-10-22 RX ADMIN — HEPARIN SODIUM 5000 UNIT(S): 5000 INJECTION INTRAVENOUS; SUBCUTANEOUS at 06:16

## 2017-10-22 RX ADMIN — MUPIROCIN 1 APPLICATION(S): 20 OINTMENT TOPICAL at 11:57

## 2017-10-22 RX ADMIN — MORPHINE SULFATE 60 MILLIGRAM(S): 50 CAPSULE, EXTENDED RELEASE ORAL at 15:20

## 2017-10-22 NOTE — PROGRESS NOTE ADULT - PROBLEM SELECTOR PLAN 2
Resolved. Likely ascites 2/2 extensive hx of hepatic pathology, hep C, HCC, etoh abuse, cirrhosis   - S/P Paracentesis  - continue diuretic therapy

## 2017-10-22 NOTE — PROGRESS NOTE ADULT - PROBLEM SELECTOR PLAN 1
Likely venous stasis superimposed with cellulitis.  Improving.  - continue  vancomycin, Zosyn stopped.   - keep legs elevated  - will continue lasix home med for lower extremity edema  - continue LE management with bactroban  - f/u ID recs  - Wound Care per wound care team.

## 2017-10-22 NOTE — PROGRESS NOTE ADULT - ASSESSMENT
62 y/o M h/o HCC, alcoholic pancreatitis, Hep-C , HTN , DM2, admitted for cellulitis of bilateral lower extremities and abdominal pain and distension secondary to ascites.

## 2017-10-22 NOTE — PROGRESS NOTE ADULT - PROBLEM SELECTOR PLAN 5
Chronic - on radiation treatment - continue morphine & oxycodone for pain control  -f/up heme/onc consult

## 2017-10-22 NOTE — PROGRESS NOTE ADULT - ASSESSMENT
hx of hepatocellular carcinoma, right and main portal tumor thrombus, Did not tolerate Nexavar.   On home hospice since Mid Aug 2017.   DC self out of hospice 3wk prior.   Admitted with cellulitis. On IV Abx.   Worseing LE edema likely due to worsening cancer     Now with macrocytic anemia and intermittent thrombocytopenia.   Possible due to liver disease and anemia of chronic disease.   Last CBC 08/14/17 with WBC 7.5, Hgb 13.3, plt 191, MCV 96.3,   Possible etiology include ?EtOH which pt denies (last 3yrs ago), ? bleed with reticulocytosis but retic not elevated.   B12 and folate are otherwise replete.     CBC Stable otherwise.

## 2017-10-22 NOTE — PROGRESS NOTE ADULT - ATTENDING COMMENTS
Pt seen + examined. Case discussed with intern/resident. Agree with assessment and plan above (edited) with following addendum:  62 y/o M h/o HCC, alcoholic pancreatitis, Hep-C , HTN , DM2, admitted for cellulitis of bilateral lower extremities and abdominal pain.   -LE cellulitis improving; ID consulted, will c/w vanco/zosyn for now and considering de-escalating to vanco when SBP r/out  -GI consult appreciated; paracentesis tomorrow   -heme/onc consulted -- pt sees the group as outpt  -abd pain may be all due to the extensive malignancy  -LE swelling improving with leg elevation ; lasix  -Bactroban for venous stasis blistering / ulcerations as per wound care recs
Prognosis is poor. Will call hospice consult.
Pt seen + examined. Case discussed with intern/resident. Agree with assessment and plan above (edited) with following addendum:  62 y/o M h/o HCC, alcoholic pancreatitis, Hep-C , HTN , DM2, admitted for cellulitis of bilateral lower extremities and abdominal pain.   -LE cellulitis improving  -paracentesis done, only 60cc removed (only small ascites seen on US)  -awaiting ascites studies to r/out SBP  -can likely de-escalate Abx to just vanco once SBP r/out -- discuss with ID  -abd pain may be all due to the extensive malignancy; GI ordered CT for further eval  -LE swelling improving with leg elevation ; lasix  -Bactroban for venous stasis blistering / ulcerations as per wound care recs

## 2017-10-22 NOTE — PROGRESS NOTE ADULT - SUBJECTIVE AND OBJECTIVE BOX
Patient is a 61y old  Male who presents with a chief complaint of Leg Pain/Swelling, ascites       INTERVAL HPI/ OVERNIGHT EVENTS: Feeling better, no new symptoms, complaints.    MEDICATIONS  (STANDING):  dextrose 5%. 1000 milliLiter(s) (50 mL/Hr) IV Continuous <Continuous>  dextrose 50% Injectable 12.5 Gram(s) IV Push once  dextrose 50% Injectable 25 Gram(s) IV Push once  furosemide    Tablet 40 milliGRAM(s) Oral daily  heparin  Injectable 5000 Unit(s) SubCutaneous every 12 hours  influenza   Vaccine 0.5 milliLiter(s) IntraMuscular once  insulin glargine Injectable (LANTUS) 34 Unit(s) SubCutaneous at bedtime  insulin lispro (HumaLOG) corrective regimen sliding scale   SubCutaneous three times a day before meals  morphine ER Tablet 60 milliGRAM(s) Oral three times a day  mupirocin 2% Ointment 1 Application(s) Topical daily  oxyCODONE    IR 30 milliGRAM(s) Oral every 6 hours  pantoprazole    Tablet 40 milliGRAM(s) Oral before breakfast  vancomycin  IVPB 1250 milliGRAM(s) IV Intermittent every 12 hours    MEDICATIONS  (PRN):  dextrose Gel 1 Dose(s) Oral once PRN Blood Glucose LESS THAN 70 milliGRAM(s)/deciliter  glucagon  Injectable 1 milliGRAM(s) IntraMuscular once PRN Glucose LESS THAN 70 milligrams/deciliter  HYDROmorphone  Injectable 0.5 milliGRAM(s) IV Push every 8 hours PRN Severe Pain (7 - 10)      Allergies    No Known Allergies    Intolerances        REVIEW OF SYSTEMS:  CONSTITUTIONAL: No fever, or fatigue  EYES: No eye pain, visual disturbances, or discharge  ENMT:  No difficulty hearing, tinnitus, vertigo; No sinus or throat pain  NECK: No pain or stiffness  RESPIRATORY: No cough, wheezing, chills or hemoptysis; No shortness of breath  CARDIOVASCULAR: No chest pain, palpitations, dizziness, or leg swelling  GASTROINTESTINAL: No abdominal or epigastric pain. No nausea, vomiting, or hematemesis; No diarrhea or constipation. No melena or hematochezia.  GENITOURINARY: No dysuria, frequency, hematuria, or incontinence  NEUROLOGICAL: No headaches, memory loss, loss of strength, numbness, or tremors  SKIN: No itching, burning, rashes, or lesions   LYMPH NODES: No enlarged glands  ENDOCRINE: No heat or cold intolerance; No hair loss; No polydipsia or polyuria  MUSCULOSKELETAL: No joint pain or swelling; + LE Pain  HEME/LYMPH: No easy bruising, or bleeding gums  ALLERGY AND IMMUNOLOGIC: No hives or eczema    Vital Signs Last 24 Hrs  T(C): 36.6 (22 Oct 2017 04:15), Max: 36.6 (22 Oct 2017 04:15)  T(F): 97.8 (22 Oct 2017 04:15), Max: 97.8 (22 Oct 2017 04:15)  HR: 120 (22 Oct 2017 04:15) (97 - 120)  BP: 122/74 (22 Oct 2017 04:15) (122/74 - 149/83)  BP(mean): --  RR: 17 (22 Oct 2017 04:15) (17 - 18)  SpO2: 97% (22 Oct 2017 04:15) (97% - 98%)    PHYSICAL EXAM:  GENERAL: NAD, Frail  HEAD:  Atraumatic, Normocephalic  EYES: EOMI, PERRLA, conjunctiva and sclera clear  ENMT: No tonsillar erythema, exudates, or enlargement; Moist mucous membranes.  NECK: Supple, No JVD, Normal thyroid  NERVOUS SYSTEM:  Alert & Oriented X3, Good concentration; Non Focal   CHEST/LUNG: Clear to auscultation bilaterally; No rales, rhonchi, wheezing, or rubs  HEART: Regular rate and rhythm; No murmurs, rubs, or gallops  ABDOMEN: Soft, Nontender, Nondistended; Bowel sounds present  EXTREMITIES:  + Bilat LE edema, erythema, venous stasis, ulcers, + bandage, clean and dry.  LYMPH: No lymphadenopathy noted  SKIN: No rashes or lesions    LABS:                        11.1   6.5   )-----------( 161      ( 22 Oct 2017 06:40 )             34.2     22 Oct 2017 06:40    137    |  100    |  19     ----------------------------<  100    4.2     |  33     |  0.78     Ca    8.1        22 Oct 2017 06:40        CAPILLARY BLOOD GLUCOSE      POCT Blood Glucose.: 129 mg/dL (22 Oct 2017 07:32)  POCT Blood Glucose.: 324 mg/dL (21 Oct 2017 21:35)  POCT Blood Glucose.: 260 mg/dL (21 Oct 2017 17:12)  POCT Blood Glucose.: 104 mg/dL (21 Oct 2017 12:26)    BLOOD CULTURE  10-20 @ 21:36   No growth to date  --  --  10-19 @ 00:19   No growth to date.  --  --    RADIOLOGY & ADDITIONAL TESTS:    Imaging Personally Reviewed:  [ ] YES     Consultant(s) Notes Reviewed:      Care Discussed with Consultants/Other Providers:

## 2017-10-23 VITALS
OXYGEN SATURATION: 96 % | SYSTOLIC BLOOD PRESSURE: 154 MMHG | HEART RATE: 114 BPM | DIASTOLIC BLOOD PRESSURE: 81 MMHG | TEMPERATURE: 98 F | RESPIRATION RATE: 17 BRPM

## 2017-10-23 LAB
COMMENT - FLUIDS: SIGNIFICANT CHANGE UP
COMMENT - FLUIDS: SIGNIFICANT CHANGE UP
HCV RNA SERPL NAA DL=5-ACNC: HIGH IU/ML
HCV RNA SPEC NAA+PROBE-LOG IU: 5.3 LOGIU/ML — HIGH
NON-GYN CYTOLOGY SPEC: SIGNIFICANT CHANGE UP

## 2017-10-23 PROCEDURE — 99239 HOSP IP/OBS DSCHRG MGMT >30: CPT

## 2017-10-23 PROCEDURE — 93970 EXTREMITY STUDY: CPT | Mod: 26

## 2017-10-23 RX ORDER — OXYCODONE HYDROCHLORIDE 5 MG/1
1 TABLET ORAL
Qty: 0 | Refills: 0 | COMMUNITY

## 2017-10-23 RX ORDER — FUROSEMIDE 40 MG
1 TABLET ORAL
Qty: 0 | Refills: 0 | COMMUNITY

## 2017-10-23 RX ORDER — MORPHINE SULFATE 50 MG/1
1 CAPSULE, EXTENDED RELEASE ORAL
Qty: 0 | Refills: 0 | COMMUNITY

## 2017-10-23 RX ORDER — MINOCYCLINE HYDROCHLORIDE 45 MG/1
1 TABLET, EXTENDED RELEASE ORAL
Qty: 14 | Refills: 0 | OUTPATIENT
Start: 2017-10-23 | End: 2017-10-30

## 2017-10-23 RX ORDER — INSULIN DETEMIR 100/ML (3)
0 INSULIN PEN (ML) SUBCUTANEOUS
Qty: 0 | Refills: 0 | COMMUNITY

## 2017-10-23 RX ADMIN — HYDROMORPHONE HYDROCHLORIDE 0.5 MILLIGRAM(S): 2 INJECTION INTRAMUSCULAR; INTRAVENOUS; SUBCUTANEOUS at 08:16

## 2017-10-23 RX ADMIN — OXYCODONE HYDROCHLORIDE 30 MILLIGRAM(S): 5 TABLET ORAL at 00:06

## 2017-10-23 RX ADMIN — HEPARIN SODIUM 5000 UNIT(S): 5000 INJECTION INTRAVENOUS; SUBCUTANEOUS at 06:16

## 2017-10-23 RX ADMIN — MUPIROCIN 1 APPLICATION(S): 20 OINTMENT TOPICAL at 12:25

## 2017-10-23 RX ADMIN — HEPARIN SODIUM 5000 UNIT(S): 5000 INJECTION INTRAVENOUS; SUBCUTANEOUS at 18:10

## 2017-10-23 RX ADMIN — OXYCODONE HYDROCHLORIDE 30 MILLIGRAM(S): 5 TABLET ORAL at 11:37

## 2017-10-23 RX ADMIN — Medication 166.67 MILLIGRAM(S): at 06:16

## 2017-10-23 RX ADMIN — MORPHINE SULFATE 60 MILLIGRAM(S): 50 CAPSULE, EXTENDED RELEASE ORAL at 06:16

## 2017-10-23 RX ADMIN — Medication 40 MILLIGRAM(S): at 06:17

## 2017-10-23 RX ADMIN — OXYCODONE HYDROCHLORIDE 30 MILLIGRAM(S): 5 TABLET ORAL at 18:09

## 2017-10-23 RX ADMIN — Medication 1: at 12:32

## 2017-10-23 RX ADMIN — OXYCODONE HYDROCHLORIDE 30 MILLIGRAM(S): 5 TABLET ORAL at 12:30

## 2017-10-23 RX ADMIN — Medication 2: at 17:20

## 2017-10-23 RX ADMIN — OXYCODONE HYDROCHLORIDE 30 MILLIGRAM(S): 5 TABLET ORAL at 06:16

## 2017-10-23 RX ADMIN — PANTOPRAZOLE SODIUM 40 MILLIGRAM(S): 20 TABLET, DELAYED RELEASE ORAL at 06:17

## 2017-10-23 RX ADMIN — MORPHINE SULFATE 60 MILLIGRAM(S): 50 CAPSULE, EXTENDED RELEASE ORAL at 17:10

## 2017-10-23 RX ADMIN — HYDROMORPHONE HYDROCHLORIDE 0.5 MILLIGRAM(S): 2 INJECTION INTRAMUSCULAR; INTRAVENOUS; SUBCUTANEOUS at 08:30

## 2017-10-23 NOTE — PROGRESS NOTE ADULT - ASSESSMENT
60 y/o M h/o HCC, alcoholic pancreatitis, Hep-C , HTN , DM2, admitted for cellulitis of bilateral lower extremities and abdominal pain and distension secondary to ascites.

## 2017-10-23 NOTE — PROGRESS NOTE ADULT - SUBJECTIVE AND OBJECTIVE BOX
Patient is a 61y old  Male who presents with a chief complaint of Leg Pain/Swelling, ascites    INTERVAL HPI/ OVERNIGHT EVENTS: Feeling better, no new symptoms, complaints.    MEDICATIONS  (STANDING):  dextrose 5%. 1000 milliLiter(s) (50 mL/Hr) IV Continuous <Continuous>  dextrose 50% Injectable 12.5 Gram(s) IV Push once  dextrose 50% Injectable 25 Gram(s) IV Push once  furosemide    Tablet 40 milliGRAM(s) Oral daily  heparin  Injectable 5000 Unit(s) SubCutaneous every 12 hours  influenza   Vaccine 0.5 milliLiter(s) IntraMuscular once  insulin glargine Injectable (LANTUS) 34 Unit(s) SubCutaneous at bedtime  insulin lispro (HumaLOG) corrective regimen sliding scale   SubCutaneous three times a day before meals  morphine ER Tablet 60 milliGRAM(s) Oral three times a day  mupirocin 2% Ointment 1 Application(s) Topical daily  oxyCODONE    IR 30 milliGRAM(s) Oral every 6 hours  pantoprazole    Tablet 40 milliGRAM(s) Oral before breakfast  vancomycin  IVPB 1250 milliGRAM(s) IV Intermittent every 12 hours    MEDICATIONS  (PRN):  dextrose Gel 1 Dose(s) Oral once PRN Blood Glucose LESS THAN 70 milliGRAM(s)/deciliter  glucagon  Injectable 1 milliGRAM(s) IntraMuscular once PRN Glucose LESS THAN 70 milligrams/deciliter  HYDROmorphone  Injectable 0.5 milliGRAM(s) IV Push every 8 hours PRN Severe Pain (7 - 10)    Allergies  No Known Allergies    REVIEW OF SYSTEMS:  CONSTITUTIONAL: No fever, or fatigue  EYES: No eye pain, visual disturbances, or discharge  ENMT:  No difficulty hearing, tinnitus, vertigo; No sinus or throat pain  NECK: No pain or stiffness  RESPIRATORY: No cough, wheezing, chills or hemoptysis; No shortness of breath  CARDIOVASCULAR: No chest pain, palpitations, dizziness, or leg swelling  GASTROINTESTINAL: No abdominal or epigastric pain. No nausea, vomiting, or hematemesis; No diarrhea or constipation. No melena or hematochezia.  GENITOURINARY: No dysuria, frequency, hematuria, or incontinence  NEUROLOGICAL: No headaches, memory loss, loss of strength, numbness, or tremors  SKIN: No itching, burning, rashes, or lesions   LYMPH NODES: No enlarged glands  ENDOCRINE: No heat or cold intolerance; No hair loss; No polydipsia or polyuria  MUSCULOSKELETAL: No joint pain or swelling; + LE Pain  HEME/LYMPH: No easy bruising, or bleeding gums  ALLERGY AND IMMUNOLOGIC: No hives or eczema    Vital Signs Last 24 Hrs  T(C): 36.6 (23 Oct 2017 04:58), Max: 36.6 (22 Oct 2017 20:21)  T(F): 97.9 (23 Oct 2017 04:58), Max: 97.9 (22 Oct 2017 20:21)  HR: 104 (23 Oct 2017 04:58) (97 - 104)  BP: 118/70 (23 Oct 2017 04:58) (118/70 - 135/78)  RR: 18 (23 Oct 2017 04:58) (17 - 18)  SpO2: 97% (23 Oct 2017 04:58) (97% - 98%)    PHYSICAL EXAM:  GENERAL: NAD, Frail  HEAD:  Atraumatic, Normocephalic  EYES: EOMI, PERRLA, conjunctiva and sclera clear  ENMT: No tonsillar erythema, exudates, or enlargement; Moist mucous membranes.  NECK: Supple, No JVD, Normal thyroid  NERVOUS SYSTEM:  Alert & Oriented X3, Good concentration; Non Focal   CHEST/LUNG: Clear to auscultation bilaterally; No rales, rhonchi, wheezing, or rubs  HEART: Regular rate and rhythm; No murmurs, rubs, or gallops  ABDOMEN: Soft, Nontender, Nondistended; Bowel sounds present  EXTREMITIES:  + Bilat LE edema, erythema, venous stasis, ulcers, + bandage, clean and dry.  LYMPH: No lymphadenopathy noted  SKIN: No rashes or lesions    LABS:             10-22    137  |  100  |  19  ----------------------------<  100<H>  4.2   |  33<H>  |  0.78    Ca    8.1<L>      22 Oct 2017 06:40    10-22    137  |  100  |  19  ----------------------------<  100<H>  4.2   |  33<H>  |  0.78    Ca    8.1<L>      22 Oct 2017 06:40      CAPILLARY BLOOD GLUCOSE    POCT Blood Glucose.: 129 mg/dL (22 Oct 2017 07:32)  POCT Blood Glucose.: 324 mg/dL (21 Oct 2017 21:35)  POCT Blood Glucose.: 260 mg/dL (21 Oct 2017 17:12)  POCT Blood Glucose.: 104 mg/dL (21 Oct 2017 12:26)    BLOOD CULTURE  10-20 @ 21:36   No growth to date  --  --  10-19 @ 00:19   No growth to date.  --  --    RADIOLOGY & ADDITIONAL TESTS:    Imaging Personally Reviewed:  [ ] YES     Consultant(s) Notes Reviewed:      Care Discussed with Consultants/Other Providers:

## 2017-10-23 NOTE — PROVIDER CONTACT NOTE (CHANGE IN STATUS NOTIFICATION) - RECOMMENDATIONS
Continue with bactroban  cleanse with normal saline, apply adaptic, bactroban, abd, pascual daily make appointment with wound care center

## 2017-10-23 NOTE — PROGRESS NOTE ADULT - ASSESSMENT
62 yo male with hx of hepatocellular carcinoma, right and main portal tumor thrombus, Did not tolerate Nexavar.   On home hospice since Mid Aug 2017.  Patient discharged himself from hospice in early October 2017; admitted now with LE edema and cellulitis    Now with macrocytic anemia and intermittent thrombocytopenia; likely due to liver disease; denies EtOH use since 2014; B12/folate are replete.

## 2017-10-23 NOTE — PROGRESS NOTE ADULT - SUBJECTIVE AND OBJECTIVE BOX
BROOKE BERGERON is a 61yMale , patient examined and chart reviewed.     INTERVAL HPI/ OVERNIGHT EVENTS:   Afebrile. No events.    PAST MEDICAL & SURGICAL HISTORY:  Liver cancer  Weight loss, unintentional: 45 lbs in 1 yr  Type 2 diabetes mellitus: IDDM  Smoker  Chronic pain with drug dependence: On suboxone now  HTN (hypertension): no medications  Emphysema lung  Depression  Diabetic neuropathy  Chronic alcoholic pancreatitis  Hepatitis C  S/P inguinal hernia repair: left  S/P arthroscopy of right knee  S/P shoulder surgery: bilateral  History of cholecystectomy    For details regarding the patient's social history, family history, and other miscellaneous elements, please refer the initial infectious diseases consultation and/or the admitting history and physical examination for this admission.    ROS:  CONSTITUTIONAL:  Negative fever or chills, feels well, good appetite  EYES:  Negative  blurry vision or double vision  CARDIOVASCULAR:  Negative for chest pain or palpitations  RESPIRATORY:  Negative for cough, wheezing, or SOB   GASTROINTESTINAL:  Negative for nausea, vomiting, diarrhea, constipation, + abdominal pain  GENITOURINARY:  Negative frequency, urgency or dysuria  NEUROLOGIC:  No headache, confusion, dizziness, lightheadedness  All other systems were reviewed and are negative     Current inpatient medications :  MEDICATIONS  (STANDING):  dextrose 5%. 1000 milliLiter(s) (50 mL/Hr) IV Continuous <Continuous>  dextrose 50% Injectable 12.5 Gram(s) IV Push once  dextrose 50% Injectable 25 Gram(s) IV Push once  furosemide    Tablet 40 milliGRAM(s) Oral daily  heparin  Injectable 5000 Unit(s) SubCutaneous every 12 hours  influenza   Vaccine 0.5 milliLiter(s) IntraMuscular once  insulin glargine Injectable (LANTUS) 34 Unit(s) SubCutaneous at bedtime  insulin lispro (HumaLOG) corrective regimen sliding scale   SubCutaneous three times a day before meals  morphine ER Tablet 60 milliGRAM(s) Oral three times a day  mupirocin 2% Ointment 1 Application(s) Topical daily  oxyCODONE    IR 30 milliGRAM(s) Oral every 6 hours  pantoprazole    Tablet 40 milliGRAM(s) Oral before breakfast  vancomycin  IVPB 1250 milliGRAM(s) IV Intermittent every 12 hours    MEDICATIONS  (PRN):  dextrose Gel 1 Dose(s) Oral once PRN Blood Glucose LESS THAN 70 milliGRAM(s)/deciliter  glucagon  Injectable 1 milliGRAM(s) IntraMuscular once PRN Glucose LESS THAN 70 milligrams/deciliter      Objective:    Vital Signs Last 24 Hrs  T(C): 36.4 (23 Oct 2017 13:18), Max: 36.6 (22 Oct 2017 20:21)  T(F): 97.6 (23 Oct 2017 13:18), Max: 97.9 (22 Oct 2017 20:21)  HR: 114 (23 Oct 2017 13:18) (103 - 114)  BP: 154/81 (23 Oct 2017 13:18) (118/70 - 154/81)  BP(mean): --  RR: 17 (23 Oct 2017 13:18) (17 - 18)  SpO2: 96% (23 Oct 2017 13:18) (96% - 98%)    Physical Exam:  General: No acute distress  Eyes: sclera anicteric, pupils equal and reactive to light  ENMT: buccal mucosa moist, pharynx not injected  Neck: supple, trachea midline  Lungs: clear, no wheeze/rhonchi  Cardiovascular: regular rate and rhythm, S1 S2  Abdomen: soft, Mildly tender,Distended + ascites bowel sounds normal  Neurological: alert and oriented x3, Cranial Nerves II-XII grossly intact  Skin: no increased ecchymosis/petechiae/purpura  Lymph Nodes: no palpable cervical/supraclavicular lymph nodes enlargements  Extremities: no cyanosis/clubbing  +edema with kaz erythema      LABS:                          11.1   6.5   )-----------( 161      ( 22 Oct 2017 06:40 )             34.2     MICROBIOLOGY:    Culture - Body Fluid with Gram Stain (collected 20 Oct 2017 21:36)  Source: .Body Fluid Peritoneal Fluid  Gram Stain (20 Oct 2017 23:27):    No polymorphonuclear cells seen    No organisms seen    by cytocentrifuge  Preliminary Report (21 Oct 2017 18:04):    No growth to date    Culture - Blood (collected 19 Oct 2017 00:19)  Source: .Blood Blood-Peripheral  Preliminary Report (20 Oct 2017 01:02):    No growth to date.    Culture - Blood (collected 19 Oct 2017 00:19)  Source: .Blood Blood-Peripheral  Preliminary Report (20 Oct 2017 01:02):    No growth to date.      Assessment :   62 y/o w/m advanced liver CA, alcoholic pancreatitis, Hep-C , HTN , IDDM admitted with bilateral  LE cellulitis sec edema  +Ascites sp paracentesis Doubt SBP  CT a/p noted      Plan :   On Vancomycin  Can dc home on minocyline 100mg po bid x 7 days  Prognosis poor    D/w Dr Mckeon    Continue with present regiment.  Appropriate use of antibiotics and adverse effects reviewed.      I have discussed the above plan of care with patient in detail. He expressed understanding of the  treatment plan . Risks, benefits and alternatives discussed in detail.   I have asked if he has any questions or concerns and appropriately addressed them to the best of my ability .    > 35 minutes were spent in direct patient care reviewing notes, medications ,labs data/ imaging , discussion with multidisciplinary team.    Thank you for allowing me to participate in care of your patient .    Scarlett Johnson MD  Infectious Disease  [phone]

## 2017-10-23 NOTE — PROGRESS NOTE ADULT - PROBLEM SELECTOR PLAN 7
- Heparin for DVT ppx  - Protonix for GI ppx

## 2017-10-23 NOTE — PROGRESS NOTE ADULT - PROBLEM SELECTOR PLAN 1
- no inpatient treatment at current time.   - pending improvement of performance status, would consider immunotherapy as outpatient

## 2017-10-23 NOTE — PROGRESS NOTE ADULT - SUBJECTIVE AND OBJECTIVE BOX
Patient seen and examined;  Chart reviewed and events noted;   continues to have pulsating pain starting in feet and extending to upper thighs and pain accross abdomen      MEDICATIONS  (STANDING):  furosemide    Tablet 40 milliGRAM(s) Oral daily  heparin  Injectable 5000 Unit(s) SubCutaneous every 12 hours  influenza   Vaccine 0.5 milliLiter(s) IntraMuscular once  insulin glargine Injectable (LANTUS) 34 Unit(s) SubCutaneous at bedtime  insulin lispro (HumaLOG) corrective regimen sliding scale   SubCutaneous three times a day before meals  morphine ER Tablet 60 milliGRAM(s) Oral three times a day  mupirocin 2% Ointment 1 Application(s) Topical daily  oxyCODONE    IR 30 milliGRAM(s) Oral every 6 hours  pantoprazole    Tablet 40 milliGRAM(s) Oral before breakfast  vancomycin  IVPB 1250 milliGRAM(s) IV Intermittent every 12 hours    MEDICATIONS  (PRN):  dextrose Gel 1 Dose(s) Oral once PRN Blood Glucose LESS THAN 70 milliGRAM(s)/deciliter  glucagon  Injectable 1 milliGRAM(s) IntraMuscular once PRN Glucose LESS THAN 70 milligrams/deciliter  HYDROmorphone  Injectable 0.5 milliGRAM(s) IV Push every 8 hours PRN Severe Pain (7 - 10)      Vital Signs Last 24 Hrs  T(C): 36.6 (23 Oct 2017 04:58), Max: 36.6 (22 Oct 2017 20:21)  T(F): 97.9 (23 Oct 2017 04:58), Max: 97.9 (22 Oct 2017 20:21)  HR: 104 (23 Oct 2017 04:58) (97 - 104)  BP: 118/70 (23 Oct 2017 04:58) (118/70 - 135/78)  BP(mean): --  RR: 18 (23 Oct 2017 04:58) (17 - 18)  SpO2: 97% (23 Oct 2017 04:58) (97% - 98%)    PHYSICAL EXAM  General: adult in NAD  HEENT: clear oropharynx, anicteric sclera, pink conjunctivae  Neck: supple  CV: normal S1S2 with no murmur rubs or gallops  Lungs: clear to auscultation, no wheezes, no rhales on anterior exam  Abdomen: + diffuse tenderness  Ext: erythema and wounds on bilateral LE's along with edema; bandages intact  Neuro: alert and oriented X3 no focal deficits      LABS:             TODAY's labs pending               11.1   6.5   )-----------( 161      ( 22 Oct 2017 06:40 )             34.2     10-22    137  |  100  |  19  ----------------------------<  100<H>  4.2   |  33<H>  |  0.78    Ca    8.1<L>      22 Oct 2017 06:40

## 2017-10-23 NOTE — PROGRESS NOTE ADULT - PROBLEM SELECTOR PROBLEM 1
HCC (hepatocellular carcinoma)
Abdominal distension
Cellulitis of lower extremity, unspecified laterality
HCC (hepatocellular carcinoma)
HCC (hepatocellular carcinoma)
Cellulitis of lower extremity, unspecified laterality

## 2017-10-23 NOTE — PROGRESS NOTE ADULT - PROBLEM SELECTOR PLAN 4
Controlled - continue lasix with hold parameters
check FOBT, check retic.   Observe for now. Transfuse if bleeding, if symptomatic, if Hgb <7.  DVT prophylaxis.
Controlled - continue lasix with hold parameters
check FOBT.     Observe for now. Transfuse if bleeding, if symptomatic, if Hgb <7.  DVT prophylaxis.
- f/u stool guaiac (ordered 10/20/17) check FOBT.   - transfuse to maintain Hg >7

## 2017-10-23 NOTE — PROGRESS NOTE ADULT - PROVIDER SPECIALTY LIST ADULT
Gastroenterology
Heme/Onc
Hospitalist
Infectious Disease
Internal Medicine
Heme/Onc
Hospitalist
Heme/Onc

## 2017-10-23 NOTE — PROVIDER CONTACT NOTE (CHANGE IN STATUS NOTIFICATION) - ASSESSMENT
patient is a 60yo male presenting with bilateral venous ulcers previously seen by Dr Castellon; Patient due to be discharged today

## 2017-10-23 NOTE — PHYSICAL THERAPY INITIAL EVALUATION ADULT - ADDITIONAL COMMENTS
Pt lives on the second floor of a 2 family house. has steps to enter/exit. Pt was independent without device. Pt stating he needs RW because sometimes he feels difficulty with his LE's.

## 2017-10-23 NOTE — PROGRESS NOTE ADULT - PROBLEM SELECTOR PLAN 2
Resolved. Likely ascites 2/2 extensive hx of hepatic pathology, hep C, HCC, etoh abuse, cirrhosis   - S/P Paracentesis  - continue diuretic therapy  - Hospice discussed w/ pt and declined

## 2017-10-23 NOTE — GOALS OF CARE CONVERSATION - PERSONAL ADVANCE DIRECTIVE - CONVERSATION DETAILS
met pt, A&O, pt agreed to complete hcp, unsure where may be located, [facility], contacted on phone, no hcp copy w her. hcp completed, discussed AD, pt is a dnr, dni, molst discussed, pt thought in [facility] may have completed, but no original w family. molst completed: dnr dni no TF, wants treatment, not ready for hospice as yet. Dr Mckeon completed molst form. contact # given

## 2017-10-23 NOTE — PROGRESS NOTE ADULT - PROBLEM SELECTOR PROBLEM 3
Thrombocytopenia
Thrombocytopenia
Type 2 diabetes mellitus
Thrombocytopenia
Type 2 diabetes mellitus

## 2017-10-23 NOTE — PROGRESS NOTE ADULT - SUBJECTIVE AND OBJECTIVE BOX
Interval Events:+ abd pain, denies n/v/d/c, having bms     HPI:62 y/o w/m h/o liver CA, alcoholic pancreatitis, Hep-C , HTN , IDDM presents to the ED with bilateral cellulitis of his lower extremities. Patient states that approximately 2 weeks ago he scraped his right knee and 4 days later he noticed swelling around his right knee. The swelling spread to his left leg and then he began developing blisters on the dorsal surfaces of his left foot and right shin. He describes his legs as being in pulsing pain. Earlier today he went to a podiatrist, who directed him to wound care, which advised him to come to the ED. He denies fever, chills, headache, nausea, vomiting, diarrhea. He admits shortness of breath when walking, relating the pain in his feet/    In ED VS T max 98.1 /69  RR 16 O2 sat 99 on room air WBC within normal limits lactate 2.0 H/H 12.6/38.9 .1 Na 133 BUN 26 Alk phos 354 AST 59  Received vancomycin, zosyn  CXR No active pulmonary disease  12 lead EKG sinus tachycardia with premature atrial complexes, 106 bpm (18 Oct 2017 21:22)        MEDICATIONS  (STANDING):  dextrose 5%. 1000 milliLiter(s) (50 mL/Hr) IV Continuous <Continuous>  dextrose 50% Injectable 12.5 Gram(s) IV Push once  dextrose 50% Injectable 25 Gram(s) IV Push once  furosemide    Tablet 40 milliGRAM(s) Oral daily  heparin  Injectable 5000 Unit(s) SubCutaneous every 12 hours  influenza   Vaccine 0.5 milliLiter(s) IntraMuscular once  insulin glargine Injectable (LANTUS) 34 Unit(s) SubCutaneous at bedtime  insulin lispro (HumaLOG) corrective regimen sliding scale   SubCutaneous three times a day before meals  morphine ER Tablet 60 milliGRAM(s) Oral three times a day  mupirocin 2% Ointment 1 Application(s) Topical daily  oxyCODONE    IR 30 milliGRAM(s) Oral every 6 hours  pantoprazole    Tablet 40 milliGRAM(s) Oral before breakfast  vancomycin  IVPB 1250 milliGRAM(s) IV Intermittent every 12 hours    MEDICATIONS  (PRN):  dextrose Gel 1 Dose(s) Oral once PRN Blood Glucose LESS THAN 70 milliGRAM(s)/deciliter  glucagon  Injectable 1 milliGRAM(s) IntraMuscular once PRN Glucose LESS THAN 70 milligrams/deciliter  HYDROmorphone  Injectable 0.5 milliGRAM(s) IV Push every 8 hours PRN Severe Pain (7 - 10)      Allergies    No Known Allergies    Intolerances        Review of Systems:    General:  No wt loss, fevers, chills, night sweats, fatigue,   Eyes:  Good vision, no reported pain  ENT:  No sore throat, pain, runny nose, dysphagia  CV:  No pain, palpitations, hypo/hypertension  Resp:  No dyspnea, cough, tachypnea, wheezing  GI: abd pain/distention  :  No pain, bleeding, incontinence, nocturia  Muscle:  No pain, weakness  Neuro:  No weakness, tingling, memory problems  Psych:  No fatigue, insomnia, mood problems, depression  Endocrine:  No polyuria, polydipsia, cold/heat intolerance  Heme:  No petechiae, ecchymosis, easy bruisability  Skin:  No rash, tattoos, scars, edema      Vital Signs Last 24 Hrs  T(C): 36.6 (23 Oct 2017 04:58), Max: 36.6 (22 Oct 2017 20:21)  T(F): 97.9 (23 Oct 2017 04:58), Max: 97.9 (22 Oct 2017 20:21)  HR: 104 (23 Oct 2017 04:58) (97 - 104)  BP: 118/70 (23 Oct 2017 04:58) (118/70 - 135/78)  BP(mean): --  RR: 18 (23 Oct 2017 04:58) (17 - 18)  SpO2: 97% (23 Oct 2017 04:58) (97% - 98%)    PHYSICAL EXAM:    GENERAL:  Appears stated age, well-groomed, well-nourished, no distress  HEENT:  NC/AT,  conjunctivae clear and pink, no thyromegaly, nodules, adenopathy, no JVD, sclera -anicteric  CHEST:  Full & symmetric excursion, no increased effort, breath sounds clear  HEART:  Regular rhythm, S1, S2, no murmur/rub/S3/S4, no abdominal bruit, no edema  ABDOMEN:  Soft, non-tender, moderate-distention, normoactive bowel sounds  EXTREMITIES:  no cyanosis,clubbing or edema, +b/l LE edema   SKIN:  No rash/erythema/ecchymoses/petechiae/wounds/abscess/warm/dry  NEURO:  Alert, oriented, no asterixis, no tremor, no encephalopathy      LABS:                        11.1   6.5   )-----------( 161      ( 22 Oct 2017 06:40 )             34.2     10-22    137  |  100  |  19  ----------------------------<  100<H>  4.2   |  33<H>  |  0.78    Ca    8.1<L>      22 Oct 2017 06:40            RADIOLOGY & ADDITIONAL TESTS:

## 2017-10-23 NOTE — PROGRESS NOTE ADULT - PROBLEM SELECTOR PROBLEM 4
Essential hypertension
Anemia in neoplastic disease
Anemia in neoplastic disease
Essential hypertension
Anemia in neoplastic disease

## 2017-10-23 NOTE — PROGRESS NOTE ADULT - PROBLEM SELECTOR PLAN 1
Likely venous stasis superimposed with cellulitis.  Improving.  - continue  vancomycin, Zosyn stopped.   - keep legs elevated  - DC planning today 10/23  - DC on minocycline 100mg BID for 7 days  - will continue lasix home med for lower extremity edema  - continue LE management with bactroban  - f/u ID recs  - Wound Care per wound care team.

## 2017-10-23 NOTE — PROGRESS NOTE ADULT - PROBLEM SELECTOR PROBLEM 2
Cellulitis of lower extremity, unspecified laterality
Cirrhosis
Abdominal distension
Cellulitis of lower extremity, unspecified laterality
Cellulitis of lower extremity, unspecified laterality
Abdominal distension

## 2017-10-25 DIAGNOSIS — F10.10 ALCOHOL ABUSE, UNCOMPLICATED: ICD-10-CM

## 2017-10-25 DIAGNOSIS — B19.20 UNSPECIFIED VIRAL HEPATITIS C WITHOUT HEPATIC COMA: ICD-10-CM

## 2017-10-25 DIAGNOSIS — E11.40 TYPE 2 DIABETES MELLITUS WITH DIABETIC NEUROPATHY, UNSPECIFIED: ICD-10-CM

## 2017-10-25 DIAGNOSIS — K86.0 ALCOHOL-INDUCED CHRONIC PANCREATITIS: ICD-10-CM

## 2017-10-25 DIAGNOSIS — F17.210 NICOTINE DEPENDENCE, CIGARETTES, UNCOMPLICATED: ICD-10-CM

## 2017-10-25 DIAGNOSIS — D69.6 THROMBOCYTOPENIA, UNSPECIFIED: ICD-10-CM

## 2017-10-25 DIAGNOSIS — Z79.4 LONG TERM (CURRENT) USE OF INSULIN: ICD-10-CM

## 2017-10-25 DIAGNOSIS — I87.8 OTHER SPECIFIED DISORDERS OF VEINS: ICD-10-CM

## 2017-10-25 DIAGNOSIS — I81 PORTAL VEIN THROMBOSIS: ICD-10-CM

## 2017-10-25 DIAGNOSIS — D63.0 ANEMIA IN NEOPLASTIC DISEASE: ICD-10-CM

## 2017-10-25 DIAGNOSIS — F32.9 MAJOR DEPRESSIVE DISORDER, SINGLE EPISODE, UNSPECIFIED: ICD-10-CM

## 2017-10-25 DIAGNOSIS — L03.115 CELLULITIS OF RIGHT LOWER LIMB: ICD-10-CM

## 2017-10-25 DIAGNOSIS — G47.00 INSOMNIA, UNSPECIFIED: ICD-10-CM

## 2017-10-25 DIAGNOSIS — K70.31 ALCOHOLIC CIRRHOSIS OF LIVER WITH ASCITES: ICD-10-CM

## 2017-10-25 DIAGNOSIS — I10 ESSENTIAL (PRIMARY) HYPERTENSION: ICD-10-CM

## 2017-10-25 DIAGNOSIS — G89.29 OTHER CHRONIC PAIN: ICD-10-CM

## 2017-10-25 DIAGNOSIS — C22.0 LIVER CELL CARCINOMA: ICD-10-CM

## 2017-10-25 DIAGNOSIS — R70.1 ABNORMAL PLASMA VISCOSITY: ICD-10-CM

## 2017-10-25 DIAGNOSIS — L03.116 CELLULITIS OF LEFT LOWER LIMB: ICD-10-CM

## 2017-10-25 LAB
CULTURE RESULTS: SIGNIFICANT CHANGE UP
SPECIMEN SOURCE: SIGNIFICANT CHANGE UP

## 2017-10-29 DIAGNOSIS — C22.8 MALIGNANT NEOPLASM OF LIVER, PRIMARY, UNSPECIFIED AS TO TYPE: ICD-10-CM

## 2017-10-29 DIAGNOSIS — L03.115 CELLULITIS OF RIGHT LOWER LIMB: ICD-10-CM

## 2017-10-29 DIAGNOSIS — R18.8 OTHER ASCITES: ICD-10-CM

## 2017-10-29 DIAGNOSIS — R00.0 TACHYCARDIA, UNSPECIFIED: ICD-10-CM

## 2017-10-29 DIAGNOSIS — L03.116 CELLULITIS OF LEFT LOWER LIMB: ICD-10-CM

## 2017-11-27 NOTE — ED ADULT TRIAGE NOTE - MEANS OF ARRIVAL
CHIEF COMPLAINT:  Fever, chills, nausea, vomiting, and flank pain with   dysuria.    HISTORY OF PRESENT ILLNESS:  This is a 52-year-old female who presents with   above symptoms.  She reports she began about 2 weeks ago when she started with   some dysuria.  At that point, she was treated with Ceftin.  Beginning about 2   days ago, her symptoms got much worse and she developed nausea and vomiting   and chills as well as pain in her flank, more on the left, but also present on   the right side.    Patient has been seen in the emergency room and found to have UTI.  A CT scan   has been done as a renal colic protocol which did not show any acute   processes.    REVIEW OF SYSTEMS:  Positive as noted, otherwise all systems are reviewed and   positive for chronic back and neck pain related to the patient's fibromyalgia   rheumatica.  All other systems are reviewed and negative.    PREVIOUS MEDICAL HISTORY:  1.  Fibromyalgia rheumatica.  2.  Type 2 diabetes.  3.  Hypertension.  4.  Rheumatoid arthritis.  5.  Asthma.    MEDICATIONS:  1.  Soma 350 mg t.i.d. p.r.n. muscle spasm.  2.  Flexeril 10 mg p.o. b.i.d. p.r.n. muscle spasm.  3.  Valium 10 mg at night for sleep.  4.  Gabapentin 300 mg 3 times daily.  5.  Ogden 325, 1-2 q. 6 hours p.r.n. pain.  6.  Ibuprofen 200 mg q. 6 hours p.r.n. pain.  7.  Toujeo 29 units in the evening.  8.  Sliding scale insulin.  9.  Pyridium 200 mg 3 times daily.    ALLERGIES:  1.  COMPAZINE.  2.  SULFA.    SOCIAL HISTORY:  The patient has approximately 15 years tobacco exposure.  She   does not drink any alcohol.    PAST SURGICAL HISTORY:  1.  Tonsillectomy.  2.  Cholecystectomy.  3.  Primary .  4.  EGD and balloon dilation.    FAMILY HISTORY:  Reviewed, but not relevant to presentation.    PHYSICAL EXAMINATION:  VITAL SIGNS:  Temperature 36.8, heart rate 86, respiratory rate 16, /93,   satting the upper 90s on 2 L nasal cannula.  GENERAL:  The patient is awake, alert and she is  in no acute distress.  HEENT:  Head is normocephalic and atraumatic.  Mucous membranes are moist.    Sclerae and conjunctivae are benign.  NECK:  Trachea is in the midline.  Neck is supple.  There are no meningeal   findings.  No JVD.  RESPIRATORY:  Clear to auscultation bilaterally.  CARDIAC:  Relative tachycardia with a heart rate in the upper 80s on the   monitor.  No murmurs, rubs, or clicks.  ABDOMEN:  Diffusely tender in the suprapubic area.  However, no guarding or   rebound.  No peritoneal findings.  No hepatosplenomegaly or masses.  EXTREMITIES:  No clubbing, cyanosis, or edema.  Peripheral pulses are   maintained.  Calves are nontender to palpation.  SKIN:  Warm and dry.  No rashes are appreciated.  NEUROLOGIC:  The patient is alert and oriented.  Speech is clear and content   logical, exam is nonfocal.  PSYCHIATRIC:  Mood and affect are appropriate.  Hygiene is neat and clean.    LABORATORY DATA:  White count 6.0, hemoglobin 13.3, platelet count 196.    Sodium 136, potassium 3.9, BUN 13, creatinine 0.91.  LFTs are unremarkable.    Lactic acid is 1.14.  UA is nitrite positive and leukocyte esterase small,   with moderate to few bacteria.    IMAGING STUDIES:  CT renal colic protocol, no acute findings.    ASSESSMENT AND PLAN:  A 52-year-old female with problem list as follows:  1.  Clinically, the patient presents with pyelonephritis.  Patient will be   admitted to the hospital for IV fluids and supportive care with antiemetics   and pain medications.  As regards antibiotic, she has already been on Ceftin   and she has a sulfa allergy, so we will treat her with ciprofloxacin.  We will   check blood and urine cultures and follow her closely clinically.  2.  History of fibromyalgia rheumatica:  Continue the patient's p.r.n.   hydrocodone.  3.  Type 2 diabetes:  Continue Toujeo and place on sliding scale.  4.  Hypertension:  Follow the patient's pressures and institute therapy as   needed.  5.  History of  asthma:  Place on O2 and respiratory protocols.  6.  History of rheumatoid arthritis:  The patient is currently on no   medications for this.    It is anticipated that the patient will be in the hospital for greater than 2   midnights to control her on pyelonephritis.       ____________________________________     DO TASHA Hammonds / SERJIO    DD:  11/26/2017 20:43:57  DT:  11/26/2017 21:55:45    D#:  [id number]  Job#:  [id number]   stretcher

## 2017-12-19 PROCEDURE — 87070 CULTURE OTHR SPECIMN AEROBIC: CPT

## 2017-12-19 PROCEDURE — 85610 PROTHROMBIN TIME: CPT

## 2017-12-19 PROCEDURE — 82962 GLUCOSE BLOOD TEST: CPT

## 2017-12-19 PROCEDURE — 74177 CT ABD & PELVIS W/CONTRAST: CPT

## 2017-12-19 PROCEDURE — 80048 BASIC METABOLIC PNL TOTAL CA: CPT

## 2017-12-19 PROCEDURE — 80076 HEPATIC FUNCTION PANEL: CPT

## 2017-12-19 PROCEDURE — 85045 AUTOMATED RETICULOCYTE COUNT: CPT

## 2017-12-19 PROCEDURE — 85730 THROMBOPLASTIN TIME PARTIAL: CPT

## 2017-12-19 PROCEDURE — 87205 SMEAR GRAM STAIN: CPT

## 2017-12-19 PROCEDURE — 84145 PROCALCITONIN (PCT): CPT

## 2017-12-19 PROCEDURE — 80053 COMPREHEN METABOLIC PANEL: CPT

## 2017-12-19 PROCEDURE — 83605 ASSAY OF LACTIC ACID: CPT

## 2017-12-19 PROCEDURE — 89051 BODY FLUID CELL COUNT: CPT

## 2017-12-19 PROCEDURE — 49083 ABD PARACENTESIS W/IMAGING: CPT

## 2017-12-19 PROCEDURE — 82746 ASSAY OF FOLIC ACID SERUM: CPT

## 2017-12-19 PROCEDURE — 84157 ASSAY OF PROTEIN OTHER: CPT

## 2017-12-19 PROCEDURE — 80202 ASSAY OF VANCOMYCIN: CPT

## 2017-12-19 PROCEDURE — 93005 ELECTROCARDIOGRAM TRACING: CPT

## 2017-12-19 PROCEDURE — 96372 THER/PROPH/DIAG INJ SC/IM: CPT | Mod: 59

## 2017-12-19 PROCEDURE — 87040 BLOOD CULTURE FOR BACTERIA: CPT

## 2017-12-19 PROCEDURE — 76700 US EXAM ABDOM COMPLETE: CPT

## 2017-12-19 PROCEDURE — 97161 PT EVAL LOW COMPLEX 20 MIN: CPT

## 2017-12-19 PROCEDURE — 96376 TX/PRO/DX INJ SAME DRUG ADON: CPT

## 2017-12-19 PROCEDURE — 71045 X-RAY EXAM CHEST 1 VIEW: CPT

## 2017-12-19 PROCEDURE — 96375 TX/PRO/DX INJ NEW DRUG ADDON: CPT

## 2017-12-19 PROCEDURE — 99285 EMERGENCY DEPT VISIT HI MDM: CPT | Mod: 25

## 2017-12-19 PROCEDURE — 87522 HEPATITIS C REVRS TRNSCRPJ: CPT

## 2017-12-19 PROCEDURE — 82042 OTHER SOURCE ALBUMIN QUAN EA: CPT

## 2017-12-19 PROCEDURE — 96374 THER/PROPH/DIAG INJ IV PUSH: CPT

## 2017-12-19 PROCEDURE — 85027 COMPLETE CBC AUTOMATED: CPT

## 2017-12-19 PROCEDURE — 93970 EXTREMITY STUDY: CPT

## 2017-12-19 PROCEDURE — G0463: CPT

## 2017-12-19 PROCEDURE — 87075 CULTR BACTERIA EXCEPT BLOOD: CPT

## 2017-12-19 PROCEDURE — 82607 VITAMIN B-12: CPT

## 2017-12-19 PROCEDURE — 83036 HEMOGLOBIN GLYCOSYLATED A1C: CPT

## 2017-12-19 PROCEDURE — 88108 CYTOPATH CONCENTRATE TECH: CPT

## 2017-12-19 PROCEDURE — 88305 TISSUE EXAM BY PATHOLOGIST: CPT

## 2018-01-25 NOTE — H&P PST ADULT - NSANTHNECKRD_ENT_A_CORE
surgery consult at bedside, mother with pt, fluid bolus in progress will continue to monitor pt
Report received from RUBEN Mayberry RN. Patient awake and alert. Denies pain. IV site asymptomatic. Will continue to monitor
DEnies pain. To be PO challenge. Moved to [facility] passed report to JULITA Lou RN fo continue care
No
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
## 2018-08-14 NOTE — ED PROVIDER NOTE - NSCAREINITIATED _GEN_ER
----- Message from Mo Stockton sent at 8/14/2018  2:56 PM CDT -----  Contact: Patient  Elena, [phone]. Calling to speak with the nurse about information she was given about her medicaid. Please advise. Thanks.  
Jose Rafael Kerr(Attending)

## 2019-09-25 NOTE — DISCHARGE NOTE ADULT - CARE PROVIDER_API CALL
Reassured patient. Dr. Guillen,   [phone]  Primary care doctor  Phone: (   )    -  Fax: (   )    - Dr. Guillen,   [phone]  Primary care doctor  Phone: (   )    -  Fax: (   )    -    Lisa Degroot), [facility] Medicine; Internal Medicine  [address] [place] [id number]  Phone: [phone]  Fax: [phone]

## 2020-01-01 NOTE — PROGRESS NOTE ADULT - PROBLEM SELECTOR PROBLEM 5
Liver cancer
[x ] Hypoglycemia Protocol for SGA / LGA / IDM / Premature Infant

## 2020-01-31 NOTE — PROGRESS NOTE ADULT - SUBJECTIVE AND OBJECTIVE BOX
Will send message to patient in [facility].   INTERVAL HPI/OVERNIGHT EVENTS:   s/p paracentesis  + abd pain    HPI:  62 y/o w/m h/o liver CA, alcoholic pancreatitis, Hep-C , HTN , IDDM presents to the ED with bilateral cellulitis of his lower extremities. Patient states that approximately 2 weeks ago he scraped his right knee and 4 days later he noticed swelling around his right knee. The swelling spread to his left leg and then he began developing blisters on the dorsal surfaces of his left foot and right shin. He describes his legs as being in pulsing pain. Earlier today he went to a podiatrist, who directed him to wound care, which advised him to come to the ED. He denies fever, chills, headache, nausea, vomiting, diarrhea. He admits shortness of breath when walking, relating the pain in his feet/    In ED VS T max 98.1 /69  RR 16 O2 sat 99 on room air WBC within normal limits lactate 2.0 H/H 12.6/38.9 .1 Na 133 BUN 26 Alk phos 354 AST 59  Received vancomycin, zosyn  CXR No active pulmonary disease  12 lead EKG sinus tachycardia with premature atrial complexes, 106 bpm (18 Oct 2017 21:22)    MEDICATIONS  (STANDING):  dextrose 5%. 1000 milliLiter(s) (50 mL/Hr) IV Continuous <Continuous>  dextrose 50% Injectable 12.5 Gram(s) IV Push once  dextrose 50% Injectable 25 Gram(s) IV Push once  furosemide    Tablet 40 milliGRAM(s) Oral daily  heparin  Injectable 5000 Unit(s) SubCutaneous every 12 hours  influenza   Vaccine 0.5 milliLiter(s) IntraMuscular once  insulin glargine Injectable (LANTUS) 34 Unit(s) SubCutaneous at bedtime  insulin lispro (HumaLOG) corrective regimen sliding scale   SubCutaneous three times a day before meals  morphine ER Tablet 60 milliGRAM(s) Oral three times a day  mupirocin 2% Ointment 1 Application(s) Topical daily  oxyCODONE    IR 30 milliGRAM(s) Oral every 6 hours  pantoprazole    Tablet 40 milliGRAM(s) Oral before breakfast  piperacillin/tazobactam IVPB. 3.375 Gram(s) IV Intermittent every 8 hours  vancomycin  IVPB 1000 milliGRAM(s) IV Intermittent every 12 hours    MEDICATIONS  (PRN):  dextrose Gel 1 Dose(s) Oral once PRN Blood Glucose LESS THAN 70 milliGRAM(s)/deciliter  glucagon  Injectable 1 milliGRAM(s) IntraMuscular once PRN Glucose LESS THAN 70 milligrams/deciliter  LORazepam     Tablet 2 milliGRAM(s) Oral at bedtime PRN Insomnia      Allergies    No Known Allergies    Intolerances          General:  No wt loss, fevers, chills, night sweats, fatigue,   Eyes:  Good vision, no reported pain  ENT:  No sore throat, pain, runny nose, dysphagia  CV:  No pain, palpitations, hypo/hypertension  Resp:  No dyspnea, cough, tachypnea, wheezing  GI: abd pain/disention  :  No pain, bleeding, incontinence, nocturia  Muscle:  No pain, weakness  Neuro:  No weakness, tingling, memory problems  Psych:  No fatigue, insomnia, mood problems, depression  Endocrine:  No polyuria, polydipsia, cold/heat intolerance  Heme:  No petechiae, ecchymosis, easy bruisability  Skin:  No rash, tattoos, scars, edema      PHYSICAL EXAM:   Vital Signs:  Vital Signs Last 24 Hrs  T(C): 36.5 (20 Oct 2017 13:42), Max: 36.8 (20 Oct 2017 04:17)  T(F): 97.7 (20 Oct 2017 13:42), Max: 98.3 (20 Oct 2017 04:17)  HR: 99 (20 Oct 2017 13:42) (99 - 102)  BP: 109/64 (20 Oct 2017 13:42) (109/64 - 120/75)  BP(mean): --  RR: 18 (20 Oct 2017 13:42) (18 - 18)  SpO2: 96% (20 Oct 2017 13:42) (95% - 96%)  Daily     Daily I&O's Summary    19 Oct 2017 07:01  -  20 Oct 2017 07:00  --------------------------------------------------------  IN: 1010 mL / OUT: 0 mL / NET: 1010 mL    20 Oct 2017 07:01  -  20 Oct 2017 17:02  --------------------------------------------------------  IN: 700 mL / OUT: 0 mL / NET: 700 mL        GENERAL:  Appears stated age, well-groomed, well-nourished, no distress  HEENT:  NC/AT,  conjunctivae clear and pink, no thyromegaly, nodules, adenopathy, no JVD, sclera -anicteric  CHEST:  Full & symmetric excursion, no increased effort, breath sounds clear  HEART:  Regular rhythm, S1, S2, no murmur/rub/S3/S4, no abdominal bruit, no edema  ABDOMEN:  Soft, non-tender, moderate-distention,, normoactive bowel sounds,  no masses ,no hepato-splenomegaly, no signs of chronic liver disease  EXTEREMITIES:  no cyanosis,clubbing or edema  SKIN:  No rash/erythema/ecchymoses/petechiae/wounds/abscess/warm/dry  NEURO:  Alert, oriented, no asterixis, no tremor, no encephalopathy      LABS:                        10.7   6.2   )-----------( 177      ( 20 Oct 2017 07:07 )             32.8     10-20    132<L>  |  97  |  21  ----------------------------<  202<H>  3.9   |  28  |  0.70    Ca    7.5<L>      20 Oct 2017 07:07    TPro  6.7  /  Alb  1.7<L>  /  TBili  0.8  /  DBili  .40<H>  /  AST  50<H>  /  ALT  46  /  AlkPhos  297<H>  10-19    PT/INR - ( 18 Oct 2017 18:51 )   PT: 11.4 sec;   INR: 1.04 ratio         PTT - ( 18 Oct 2017 18:51 )  PTT:32.4 sec    amylase   lipase  RADIOLOGY & ADDITIONAL TESTS:

## 2021-03-12 NOTE — ED ADULT NURSE NOTE - HARM RISK FACTORS
Consent: Written consent was obtained and risks were reviewed including but not limited to scarring, infection, bleeding, scabbing, incomplete removal, nerve damage and allergy to anesthesia. no

## 2021-04-27 NOTE — DISCHARGE NOTE ADULT - WITHDRAWAL SYMPTOMS INCLUDE; NEGATIVE MOOD, URGES TO SMOKE, AND DIFFICULTY CONCENTRATING.
Final Anesthesia Post-op Assessment    Patient: Kristin Earl  Procedure(s) Performed: LABOR ANALGESIA  Anesthesia type: L&D Epidural    Vitals Value Taken Time   Temp 36.6 °C (97.8 °F) 04/26/21 2345   Pulse 87 04/26/21 2345   Resp 16 04/26/21 2345   SpO2 99 % 04/26/21 2345   /80 04/26/21 2345         Patient Location: Patient location: medical surgical floor.  Post-op Vital Signs:stable  Level of Consciousness: participates in exam, alert, awake and return to baseline  Respiratory Status: spontaneous ventilation  Cardiovascular blood pressure returned to baseline  Hydration: euvolemic  Pain Management: well controlled and adequately managed  Handoff: Handoff to receiving clinician was performed and questions were answered  Vomiting: none   Nausea: None  Airway Patency:patent  Post-op Assessment: awake, alert, appropriately conversant, or baseline, no complications, patient tolerated procedure well with no complications and moving all extremities  Comments: Nausea and vomiting control satisfactory.  No post-dural puncture headache.  No significant back pain.  No neurologic complications.  No apparent anesthetic complications.      No complications documented.    Statement Selected

## 2021-06-07 NOTE — H&P ADULT - CARDIOVASCULAR
Regular rate & rhythm, normal S1, S2; no murmurs, gallops or rubs; no S3, S4 Cheek-To-Nose Interpolation Flap Text: A decision was made to reconstruct the defect utilizing an interpolation axial flap and a staged reconstruction.  A telfa template was made of the defect.  This telfa template was then used to outline the Cheek-To-Nose Interpolation flap.  The donor area for the pedicle flap was then injected with anesthesia.  The flap was excised through the skin and subcutaneous tissue down to the layer of the underlying musculature.  The interpolation flap was carefully excised within this deep plane to maintain its blood supply.  The edges of the donor site were undermined.   The donor site was closed in a primary fashion.  The pedicle was then rotated into position and sutured.  Once the tube was sutured into place, adequate blood supply was confirmed with blanching and refill.  The pedicle was then wrapped with xeroform gauze and dressed appropriately with a telfa and gauze bandage to ensure continued blood supply and protect the attached pedicle.

## 2021-08-06 NOTE — PROGRESS NOTE ADULT - PROBLEM/PLAN-4
DISPLAY PLAN FREE TEXT
Statement Selected
DISPLAY PLAN FREE TEXT

## 2021-09-03 NOTE — PROGRESS NOTE BEHAVIORAL HEALTH - NS ED BHA MSE SPEECH ARTICULATION
"    Assessment & Plan   Problem List Items Addressed This Visit     None      Visit Diagnoses     Cough    -  Primary    Relevant Orders    Streptococcus A Rapid Screen w/Reflex to PCR - Clinic Collect (Completed)    Symptomatic COVID-19 Virus (Coronavirus) by PCR Nose    Group A Streptococcus PCR Throat Swab    Viral syndrome        Suspected COVID-19 virus infection                 15 minutes spent on the date of the encounter doing chart review, history and exam, documentation and further activities per the note       BMI:   Estimated body mass index is 28.53 kg/m  as calculated from the following:    Height as of 6/3/21: 1.575 m (5' 2\").    Weight as of 6/10/21: 70.8 kg (156 lb).       Patient Instructions   Increase rest and fluids. Tylenol and/or Ibuprofen for comfort.  If your symptoms worsen or do not resolve follow up with your primary care provider in 1 week and sooner if needed.      self isolate for a total of 10 days even if test is negative. You may return to work when fever free for 24 hours without fever reducing meds and you are symptom free.  Indications for emergent return to emergency department discussed with patient, who verbalized good understanding and agreement.  Patient understands the limitations of today's evaluation.           Patient Education     Viral Syndrome (Adult)  A viral illness may cause a number of symptoms such as fever. Other symptoms depend on the part of the body that the virus affects. If it settles in your nose, throat, and lungs, it may cause cough, sore throat, congestion, runny nose, headache, earache and other ear symptoms, or shortness of breath. If it settles in your stomach and intestinal tract, it may cause nausea, vomiting, cramping, and diarrhea. Sometimes it causes generalized symptoms like \"aching all over,\" feeling tired, loss of energy, or loss of appetite.  A viral illness usually lasts anywhere from several days to several weeks, but sometimes it lasts "
longer. In some cases, a more serious infection can look like a viral syndrome in the first few days of the illness. You may need another exam and additional tests to know the difference. Watch for the warning signs listed below for when to seek medical advice.  Home care  Follow these guidelines for taking care of yourself at home:    If symptoms are severe, rest at home for the first 2 to 3 days.    Stay away from cigarette smoke - both your smoke and the smoke from others.    You may use over-the-counter acetaminophen or ibuprofen for fever, muscle aching, and headache, unless another medicine was prescribed for this. If you have chronic liver or kidney disease or ever had a stomach ulcer or gastrointestinal bleeding, talk with your healthcare provider before using these medicines. No one who is younger than 18 and ill with a fever should take aspirin. It may cause severe disease or death.    Your appetite may be poor, so a light diet is fine. Avoid dehydration by drinking 8 to 12, 8-ounce glasses of fluids each day. This may include water; orange juice; lemonade; apple, grape, and cranberry juice; clear fruit drinks; electrolyte replacement and sports drinks; and decaffeinated teas and coffee. If you have been diagnosed with a kidney disease, ask your healthcare provider how much and what types of fluids you should drink to prevent dehydration. If you have kidney disease, drinking too much fluid can cause it build up in the your body and be dangerous to your health.    Over-the-counter remedies won't shorten the length of the illness but may be helpful for symptoms such as cough, sore throat, nasal and sinus congestion, or diarrhea. Don't use decongestants if you have high blood pressure.  Follow-up care  Follow up with your healthcare provider if you do not improve over the next week.  Call 911  Call 911 if any of the following occur:    Convulsion    Feeling weak, dizzy, or like you are going to faint    Chest 
pain, or more than mild shortness of breath  When to seek medical advice  Call your healthcare provider right away if any of these occur:    Cough with lots of colored sputum (mucus) or blood in your sputum    Chest pain, shortness of breath, wheezing, or trouble breathing    Severe headache; face, neck, or ear pain    Severe, constant pain in the lower right side of your belly (abdominal)    Continued vomiting (can t keep liquids down)    Frequent diarrhea (more than 5 times a day); blood (red or black color) or mucus in diarrhea    Feeling weak, dizzy, or like you are going to faint    Extreme thirst    Fever of 100.4 F (38 C) or higher, or as directed by your healthcare provider  [employer] last reviewed this educational content on 4/1/2018 2000-2021 [employer]. All rights reserved. This information is not intended as a substitute for professional medical care. Always follow your healthcare professional's instructions.           Patient Education     Coronavirus Disease 2019 (COVID-19): Caring for Yourself or Others   If you or a household member have symptoms of COVID-19, follow the guidelines below. This will help you manage symptoms and keep the virus from spreading.  If you have symptoms of COVID-19    Stay home and contact your care team. They will tell you what to do. You may be told to stay home and away from others (self-isolate). You may also be told to stay at least 6 feet from others (social distancing).    Stay away from work, school, and public places.    Limit physical contact with others in your home. Limit visitors. No kissing.  Clean surfaces you touch with disinfectant.  If you need to cough or sneeze, do it into a tissue. Then throw the tissue into the trash. If you don't have tissues, cough or sneeze into the bend of your elbow.  Don t share food or personal items with people in your household. This includes items like eating and drinking utensils, towels, and bedding.  Wear a 
cloth face mask around other people. During a public health emergency, medical face masks may be reserved for healthcare workers. You may need to make a cloth face mask of your own. You can do this using a bandana, T-shirt, or other cloth. The [facility] has instructions on how to make a face mask. Wear the mask so that it covers both your nose and mouth.  If you need to go to a hospital or clinic, call ahead to let them know. Expect the care team to wear masks, gowns, gloves, and eye protection. You may need to come to a different entrance or wait in a separate room.  Follow all instructions from your care team.    If you ve been diagnosed with COVID-19    Stay home and away from others, including other people in your home. (This is called self-isolation.) Don t leave home unless you need to get medical care. Don t go to work, school, or public places. Don t use buses, taxis, or other public transportation.    Follow all instructions from your care team.    If you need to go to a hospital or clinic, call ahead to let them know. Expect the care team to wear masks, gowns, gloves, and eye protection. You may need to come to a different entrance or wait in a separate room.    Wear a face mask over your nose and mouth. This is to protect others from your germs. If you can t wear a mask, your caregivers should wear one. You may need to make your own mask using a bandana, T-shirt, or other cloth. See the CDC s instructions on how to make a face mask.    Have no contact with pets and other animals.    Don t share food or personal items with people in your household. This includes items like eating and drinking utensils, towels, and bedding.    If you need to cough or sneeze, do it into a tissue. Then throw the tissue into the trash. If you don't have tissues, cough or sneeze into the bend of your elbow.    Wash your hands often.    Self-care at home  The FDA has approved an antiviral medicine (called remdesivir) for people being 
treated in the hospital. This is for people 12 years and older who weigh more than about 88 pounds (40 kgs). In certain cases, it may also be used for people younger than 12 years or who weigh less than about 88 pounds (40 kgs)..  Currently, treatment is mostly aimed at helping your body while it fights the virus.    Getting extra rest.    Drink extra fluids 6 to 8 glasses of liquids each day), unless a doctor has told you not to. Ask your care team which fluids are best for you. Avoid fluids that contain caffeine or alcohol.    Taking over-the-counter (OTC) medicine to reduce pain and fever. Your care team will tell you which medicine to use.  If you ve been in the hospital for COVID-19, follow your care team s instructions. They will tell you when to stop self-isolation. They may also have you change positions to help your breathing (such as lying on your belly).  If a test showed that you have COVID-19, you may be asked to donate plasma after you ve recovered. (This is called COVID-19 convalescent plasma donation.) The plasma may contain antibodies to help fight the virus in others. Experts don't know the safety of plasma or how well it works. Research continues, and the FDA has approved it for emergency use in certain people with serious or life-threatening COVID-19.  Caring for a sick person     Follow all instructions from the care team.    Wash your hands often.    Wear protective clothing as advised.    Make sure the sick person wears a mask. If they can't wear a mask, don t stay in the same room with them. If you must be in the same room, wear a face mask. Make sure the mask covers both the nose and mouth.    Keep track of the sick person s symptoms.    Clean surfaces often with disinfectant. This includes phones, kitchen counters, fridge door handles, bathroom surfaces, and others.    Don t let anyone share household items with the sick person. This includes eating and drinking tools, towels, sheets, or 
blankets.    Clean fabrics and laundry well.    Keep other people and pets away from the sick person.    When you can stop self-isolation  When you are sick with COVID-19, you should stay away from other people. This is called self-isolation. The rules for ending self-isolation depend on your health in general.  If you are normally healthy:  You can stop self-isolation when all 3 of these are true:    You ve had no fever--and no medicine that reduces fever--for at least 24 hours. And     Your symptoms are better, such as cough or trouble breathing. And     At least 10 days have passed since your symptoms first started.  Talk with your care team before you leave home. They may tell you it s okay to leave, or they may give you different advice. You do not need to be re-tested.  If you have a weak immune system, or you ve had severe COVID-19:  Follow your care team s instructions. You may be asked to self-isolate for 10 days to 20 days after your symptoms first started. Your care team may want to re-test you for COVID-19.  Note: If you re being treated for cancer, have an immune disorder (such as HIV), or have had a transplant (organ or bone marrow), you may have a weak immune system.  If you've already had COVID-19 once:  If you had the virus over 3 months ago, and you ve been exposed again, treat it like you've never had COVID-19. Stay home, limit your contact with others, call your care team, and watch for symptoms.  If it s been less than 3 months since you had the virus, you re symptom-free, and you ve been exposed again: You don t need to self-isolate. You don t need to be re-tested, unless you have new symptoms of COVID-19 that can t be linked to another illness. But if you develop new symptoms, stay home. Contact your care team if you have questions.  When you return to public settings  When you are well enough to go outside your home, follow the CDC s guidance on cloth face masks.    Anyone over age 2 should 
wear a face mask in public, especially when it's hard to socially distance. This includes public transit, public protests and marches, and crowded stores, bars, and restaurants.    Face masks are most likely to reduce the spread of COVID-19 when they are widely used by people who are out in the public.  Certain people should not wear a face covering. These include:    Children under 2 years old    Anyone with a health, developmental, or mental health condition that can be made worse by wearing a mask    Anyone who is unconscious or unable to remove the face covering without help. See the CDC's guidance on who should not wear a face mask.  When to call your care team  Call your care team right away if a sick person has any of these:    Trouble breathing    Pain or pressure in chest  If a sick person has any of these, call 911:    Trouble breathing that gets worse    Pain or pressure in chest that gets worse    Blue tint to lips or face    Fast or irregular heartbeat    Confusion or trouble waking    Fainting or loss of consciousness    Coughing up blood  Going home from the hospital   If you have COVID-19 and were recently in the hospital:    Follow the instructions above for self-care and isolation.    Follow the hospital care team s instructions.    Ask questions if anything is unclear to you. Write down answers so you remember them.  Date last modified: 1/15/2021  [employer] last reviewed this educational content on 4/1/2020  This information has been modified by your health care provider with permission from the publisher.    9081-5480 [employer] [address] [place] [address], [place] [address]. All rights reserved. This information is not intended as a substitute for professional medical care. Always follow your healthcare professional's instructions.               Return in about 1 week (around 9/9/2021), or if symptoms worsen or fail to improve, for Follow up with your primary care provider 
virtually.    SHAWNEE Luke CNP  [facility]    Tomasz Velazco is a 29 year old who presents for the following health issues     HPI     Chief Complaint   Patient presents with     URI     Started yesterday with fatigue, headache, congestion, chills, sneezing, friend stated she was flushed and warm yesterday, diarrhea.            Review of Systems   Constitutional, HEENT, cardiovascular, pulmonary, GI, , musculoskeletal, neuro, skin, endocrine and psych systems are negative, except as otherwise noted.      Objective    /87 (BP Location: Left arm, Patient Position: Sitting, Cuff Size: Adult Small)   Pulse 95   Temp 98.8  F (37.1  C) (Tympanic)   SpO2 100%   There is no height or weight on file to calculate BMI.  Physical Exam   GENERAL: healthy, alert and no distress, nontoxic in appearance  EYES: Eyes grossly normal to inspection, PERRL and conjunctivae and sclerae normal  HENT: ear canals and TM's normal, nose and mouth without ulcers or lesions  NECK: no adenopathy, supple with full ROM  RESP: lungs clear to auscultation - no rales, rhonchi or wheezes  CV: regular rate and rhythm, normal S1 S2, no S3 or S4, no murmur, click or rub, no peripheral edema   ABDOMEN: soft, nontender, no hepatosplenomegaly, no masses and bowel sounds normal  MS: no gross musculoskeletal defects noted, no edema    Results for orders placed or performed in visit on 09/02/21 (from the past 24 hour(s))   Streptococcus A Rapid Screen w/Reflex to PCR - Clinic Collect    Specimen: Throat; Swab   Result Value Ref Range    Group A Strep antigen Negative Negative               
Normal
Normal

## 2022-01-21 NOTE — PROVIDER CONTACT NOTE (OTHER) - ACTION/TREATMENT ORDERED:
tylenol 650 mg PO. tylenol 650 mg PO for pain. Patient is a 57 yo female with PPHx of schizophrenia, PMHx of hypothyroidism, HLD, HTN, vit D def, and obesity transferred from [facility] for decreased PO intake, nausea and vomiting, concern for lithium toxicity, found to have metabolic derangements (elevated serum glucose with high anion gap metabolic acidosis concerning for clozapine-induced DKA), leukocytosis and positive UA concerning for UTI, and electrolyte imbalance 2/2 dehydration and vomiting. Seen by C/L Psych for medication management.     Per [facility] paperwork, patient had lithium on hold 1/18/22 for concern of lithium toxicity and decrease of clozapine from 200 mg QD to 100 mg QD. Unclear if patient had vomited or received her clozapine prior to presentation at [facility] ED. Recent bloodwork from 1/3/22 showed 766 clozapine level (normal 300-700) and lithium level 0.32. Lithium level 1/18/22 was 1.1.     1/20: On assessment, pt was calm and cooperative, responsive to all questions, states that she feels well. She refused repeat ECG this AM. Denies SIIP/HIIP, AVH, or delusions. VSS stable and afebrile, denies any physical discomfort, nausea/vomiting, or abdominal pain. Pt was restarted on lithium 600 mg QD.   1/21: Pt was calm and responsive to all questions, though childish and irritable when asked why she is refusing medications and care. Refused clozapine 25 mg dose last night. Did not require PRN's. She ate breakfast this AM and slept well. In NAD, denies any discomfort, pain, nausea/vomiting or dizziness. K+ level as of 1/20/22 was 2.9, serum glucose 132.     PLAN:  - Would c/w 1:1 as patient is agitated, combative at baseline per [facility] staff.  - Would f/u CBC with ANC regularly   - Given prolonged QTC would HOLD Clozapine until repeat ECG able to be obtained. If QTc is <500, then OK to restart Clozapine at 25mg qHS (would NOT restart at full dose). Can titrate clozapine up to 100mg over 4 days (increase by 25mg per day).  - Continue Lithium 600mg QD  - Repeat Lithium level at trough immediately PRIOR to AM dose  - Continue Lamotrigine 100mg QD  - PRN for agitation Zyprexa 5mg q6h PRN PO/IM  - Cannot leave AMA Patient is a 59 yo female with PPHx of schizophrenia, PMHx of hypothyroidism, HLD, HTN, vit D def, and obesity transferred from [facility] for decreased PO intake, nausea and vomiting, concern for lithium toxicity, found to have metabolic derangements (elevated serum glucose with high anion gap metabolic acidosis concerning for clozapine-induced DKA), leukocytosis and positive UA concerning for UTI, and electrolyte imbalance 2/2 dehydration and vomiting. Seen by C/L Psych for medication management.     Per [facility] paperwork, patient had lithium on hold 1/18/22 for concern of lithium toxicity and decrease of clozapine from 200 mg QD to 100 mg QD. Unclear if patient had vomited or received her clozapine prior to presentation at [facility] ED. Recent bloodwork from 1/3/22 showed 766 clozapine level (normal 300-700) and lithium level 0.32. Lithium level 1/18/22 was 1.1.     1/20: On assessment, pt was calm and cooperative, responsive to all questions, states that she feels well. She refused repeat ECG this AM. Denies SIIP/HIIP, AVH, or delusions. VSS stable and afebrile, denies any physical discomfort, nausea/vomiting, or abdominal pain. Pt was restarted on lithium 600 mg QD.   1/21: Pt was calm and responsive to all questions, though childish and irritable when asked why she is refusing medications and care. Refused clozapine 25 mg dose last night. Did not require PRN's. She ate breakfast this AM and slept well. In NAD, denies any discomfort, pain, nausea/vomiting or dizziness. K+ level as of 1/20/22 was 2.9, serum glucose 132.     PLAN:  - Would c/w 1:1 unless patient unable to be sent to 7S, does not need 1:1 on 7S  - Would f/u CBC with ANC regularly   - Given prolonged QTC would HOLD Clozapine until repeat ECG able to be obtained. If QTc is <500, then OK to restart Clozapine at 25mg qHS (would NOT restart at full dose).   - IF patient starts taking PO clozapine reliability then would increase Clozapine by 25mg per night with goal dose of 100mg qHS after 4d.   - Continue Lithium 600mg QD  - Repeat Lithium level at trough immediately PRIOR to AM dose  - Continue Lamotrigine 100mg QD  - PRN for agitation Zyprexa 5mg q6h PRN PO/IM  - Cannot leave AMA

## 2024-01-05 NOTE — ED CLERICAL - NS ED CLERK UNITS
Medication: Tamsulosin  Last office visit date: 10/12/23  Next appointment scheduled?: Yes   Number of refills given: 90 R 0    Medication: Allopurinol  Last office visit date: 10/12/23  Next appointment scheduled?: Yes   Number of refills given: 90 R 0     MED [id number]

## 2025-03-05 NOTE — DISCHARGE NOTE ADULT - PRINCIPAL DIAGNOSIS
It was a pleasure taking care of you today and appreciate your seeing us at our [facility] and [facility] [place] [facility].     Today's plan is as follows:  1) Elevate your legs at rest  2) Wear 20-30mmHg compression stockings, on during the day when standing, off while sleeping  3) No vascular procedures to offer at this time with surgery. Continue to follow-up with Dr. Cueto with Vascular medicine     Please call the office with any questions at [phone].   You can speak to our secretaries or our clinical nurses for specific questions.   For [facility] specific questions, you can also call [phone] or email at [email or web address]  If you need coordinating your appointments and testing you can do these at the  or by calling my office shortly after your visit.        Delirium due to another medical condition Liver cancer

## 2025-06-11 NOTE — PROVIDER CONTACT NOTE (OTHER) - ACTION/TREATMENT ORDERED:
Anesthesia Pre Eval Note    Anesthesia ROS/Med Hx        Anesthetic Complication History:    Patient does not have a history of anesthetic complications      Pulmonary Review:  Patient does not have a pulmonary history      Neuro/Psych Review:       Positive for psychiatric history    Cardiovascular Review:     Positive for hypertension  Positive for hyperlipidemia    GI/HEPATIC/RENAL Review:     Positive for nausea   Positive for renal disease    End/Other Review:    Positive for arthritis  Positive for chronic pain  Positive for cancer      Relevant Problems   No relevant active problems       Physical Exam     Airway   Mallampati: II  TM Distance: >3 FB  Neck ROM: Full  Neck: Non-tender and Able to place in sniff position  TMJ Mobility: Good    Cardiovascular  Cardiovascular exam normal  Cardio Rhythm: Regular  Cardio Rate: Normal    Head Assessment  Head assessment: Normocephalic and Atraumatic    General Assessment  General Assessment: Alert and oriented and No acute distress    Dental Exam  Dental exam normal    Pulmonary Exam  Pulmonary exam normal  Breath sounds clear to auscultation:  Yes    Abdominal Exam  Abdominal exam normal      Vitals:   Patient Vitals for the past 4 hrs (Last 1 readings):   BP Temp Temp src Pulse Resp SpO2   06/11/25 0945 (!) 154/82 36.9 °C (98.4 °F) Temporal 68 17 95 %         Anesthesia Plan:    ASA Status: 3  Anesthesia Type: General    Induction: Intravenous  Preferred Airway Type: ETT  Maintenance: TIVA  Premedication: Oral      Post-op Pain Management: Per Surgeon      Checklist  Reviewed: NPO Status, Allergies, Medications, Problem list, Past Med History, Lab Results, EKG, Chest X-Rays, Beta Blocker Status and Patient Summary  Monitors  Discussed risks of the following Invasive monitors with patient: Arterial Line    Consent/Risks Discussed Statement:  The proposed anesthetic plan, including its risks and benefits, have been discussed with the Patient along with the risks and  benefits of alternatives. Questions were encouraged and answered and the patient and/or representative understands and agrees to proceed.        I discussed with the patient (and/or patient's legal representative) the risks and benefits of the proposed anesthesia plan, General, which may include services performed by other anesthesia providers.    Alternative anesthesia plans, if available, were reviewed with the patient (and/or patient's legal representative). Discussion has been held with the patient (and/or patient's legal representative) regarding risks of anesthesia, which include Nausea, Vomiting, Dental Injury, Intra-operative Awareness, Emergence Delirium, Nerve Injury, Aspiration, Post-op Intubation, Allergic Reaction, Need for Blood Transfusion, Sore Throat, Headache, Hypotension and ICU Admit and emergent situations that may require change in anesthesia plan.    The patient (and/or patient's legal representative) has indicated understanding, his/her questions have been answered, and he/she wishes to proceed with the planned anesthetic.    Blood Products: Not Anticipated     Pa stated to take 2 people to bathroom Pa stated to take 2 people to bathroom and per pa ok to give ativan po that is ordered.
